# Patient Record
Sex: MALE | Race: WHITE | Employment: OTHER | RURAL
[De-identification: names, ages, dates, MRNs, and addresses within clinical notes are randomized per-mention and may not be internally consistent; named-entity substitution may affect disease eponyms.]

---

## 2017-01-23 RX ORDER — SUCRALFATE 1 G/1
1 TABLET ORAL 4 TIMES DAILY
COMMUNITY
End: 2022-09-16

## 2017-01-23 RX ORDER — PANTOPRAZOLE SODIUM 40 MG/1
40 TABLET, DELAYED RELEASE ORAL DAILY
COMMUNITY
End: 2022-09-16

## 2017-09-30 ENCOUNTER — OFFICE VISIT (OUTPATIENT)
Dept: PRIMARY CARE CLINIC | Age: 47
End: 2017-09-30
Payer: MEDICAID

## 2017-09-30 VITALS
HEART RATE: 70 BPM | OXYGEN SATURATION: 98 % | SYSTOLIC BLOOD PRESSURE: 118 MMHG | HEIGHT: 67 IN | BODY MASS INDEX: 29.82 KG/M2 | DIASTOLIC BLOOD PRESSURE: 72 MMHG | WEIGHT: 190 LBS | RESPIRATION RATE: 20 BRPM

## 2017-09-30 DIAGNOSIS — R42 VERTIGO: ICD-10-CM

## 2017-09-30 DIAGNOSIS — R25.1 TREMOR OF BOTH HANDS: Primary | ICD-10-CM

## 2017-09-30 DIAGNOSIS — R51.9 HEADACHE, UNSPECIFIED HEADACHE TYPE: ICD-10-CM

## 2017-09-30 LAB — GLUCOSE BLD-MCNC: 107 MG/DL

## 2017-09-30 PROCEDURE — 82962 GLUCOSE BLOOD TEST: CPT | Performed by: NURSE PRACTITIONER

## 2017-09-30 PROCEDURE — 99213 OFFICE O/P EST LOW 20 MIN: CPT | Performed by: NURSE PRACTITIONER

## 2017-09-30 ASSESSMENT — ENCOUNTER SYMPTOMS
RESPIRATORY NEGATIVE: 1
GASTROINTESTINAL NEGATIVE: 1
EYES NEGATIVE: 1

## 2017-10-02 ENCOUNTER — OFFICE VISIT (OUTPATIENT)
Dept: PRIMARY CARE CLINIC | Age: 47
End: 2017-10-02
Payer: MEDICAID

## 2017-10-02 VITALS
BODY MASS INDEX: 30.13 KG/M2 | SYSTOLIC BLOOD PRESSURE: 120 MMHG | DIASTOLIC BLOOD PRESSURE: 70 MMHG | HEIGHT: 67 IN | OXYGEN SATURATION: 98 % | RESPIRATION RATE: 20 BRPM | HEART RATE: 56 BPM | WEIGHT: 192 LBS

## 2017-10-02 DIAGNOSIS — R25.1 TREMORS OF NERVOUS SYSTEM: ICD-10-CM

## 2017-10-02 DIAGNOSIS — R51.9 ACUTE INTRACTABLE HEADACHE, UNSPECIFIED HEADACHE TYPE: Primary | ICD-10-CM

## 2017-10-02 DIAGNOSIS — K21.00 GASTROESOPHAGEAL REFLUX DISEASE WITH ESOPHAGITIS: ICD-10-CM

## 2017-10-02 DIAGNOSIS — E16.2 HYPOGLYCEMIA: ICD-10-CM

## 2017-10-02 LAB — HBA1C MFR BLD: 5.9 %

## 2017-10-02 PROCEDURE — 83036 HEMOGLOBIN GLYCOSYLATED A1C: CPT | Performed by: NURSE PRACTITIONER

## 2017-10-02 PROCEDURE — 99214 OFFICE O/P EST MOD 30 MIN: CPT | Performed by: NURSE PRACTITIONER

## 2017-10-02 RX ORDER — PANTOPRAZOLE SODIUM 40 MG/1
40 TABLET, DELAYED RELEASE ORAL DAILY
Qty: 30 TABLET | Refills: 3 | Status: SHIPPED | OUTPATIENT
Start: 2017-10-02 | End: 2018-01-03 | Stop reason: SDUPTHER

## 2017-10-02 RX ORDER — SUMATRIPTAN 50 MG/1
50 TABLET, FILM COATED ORAL
Qty: 9 TABLET | Refills: 3 | Status: SHIPPED | OUTPATIENT
Start: 2017-10-02 | End: 2018-01-03 | Stop reason: SDUPTHER

## 2017-10-02 ASSESSMENT — PATIENT HEALTH QUESTIONNAIRE - PHQ9
1. LITTLE INTEREST OR PLEASURE IN DOING THINGS: 1
SUM OF ALL RESPONSES TO PHQ QUESTIONS 1-9: 1
SUM OF ALL RESPONSES TO PHQ9 QUESTIONS 1 & 2: 1
2. FEELING DOWN, DEPRESSED OR HOPELESS: 0

## 2017-10-02 ASSESSMENT — ENCOUNTER SYMPTOMS
EYES NEGATIVE: 1
RESPIRATORY NEGATIVE: 1
GASTROINTESTINAL NEGATIVE: 1

## 2017-10-02 NOTE — PROGRESS NOTES
0.3 - 1.2 mg/dL Not in Time Range    Total Protein 7.6 (9/14/2017) 6.4 - 8.3 g/dL Not in Time Range        Hemoglobin A1C (%)   Date Value   10/02/2017 5.9     Microscopic Examination (no units)   Date Value   08/27/2016 YES     LDL Calculated (mg/dL)   Date Value   12/12/2016 162 (H)         Lab Results   Component Value Date    WBC 8.3 09/14/2017    NEUTROABS 3.2 09/14/2017    HGB 14.2 09/14/2017    HCT 41.3 09/14/2017    MCV 89.0 09/14/2017     09/14/2017       Lab Results   Component Value Date    TSH 3.96 12/12/2016         ASSESSMENT/PLAN:     1. Acute intractable headache, unspecified headache type  Ct normal.   Will treat with migraine medication.   - SUMAtriptan (IMITREX) 50 MG tablet; Take 1 tablet by mouth once as needed for Migraine  Dispense: 9 tablet; Refill: 3    2. Gastroesophageal reflux disease with esophagitis    - pantoprazole (PROTONIX) 40 MG tablet; Take 1 tablet by mouth daily  Dispense: 30 tablet; Refill: 3    3. Tremors of nervous system  A1C is 5.8.    - Hemoglobin A1C; Future    4.  Hypoglycemia     - POCT glycosylated hemoglobin (Hb A1C)

## 2017-10-02 NOTE — MR AVS SNAPSHOT
After Visit Summary             Fany Head   10/2/2017 9:30 AM   Office Visit    Description:  Male : 1970   Provider:  JEFFERY Park   Department:  [address] [place] [address] Drive and Future Appointments         Below is a list of your follow-up and future appointments. This may not be a complete list as you may have made appointments directly with providers that we are not aware of or your providers may have made some for you. Please call your providers to confirm appointments. It is important to keep your appointments. Please bring your current insurance card, photo ID, co-pay, and all medication bottles to your appointment. If self-pay, payment is expected at the time of service. Your To-Do List     Future Orders Complete By Expires    Hemoglobin A1C [LAB90 Custom]  2017 10/2/2018         Information from Your Visit        Department     Name Address Phone Fax    [address] [place] [phone]      You Were Seen for:         Comments    Acute intractable headache, unspecified headache type   [[id number]]         Vital Signs     Blood Pressure Pulse Respirations Height Weight Oxygen Saturation    120/70 56 20 5' 7\" (1.702 m) 192 lb (87.1 kg) 98%    Body Mass Index Smoking Status                30.07 kg/m2 Current Every Day Smoker          Additional Information about your Body Mass Index (BMI)           Your BMI as listed above is considered obese (30 or more). BMI is an estimate of body fat, calculated from your height and weight. The higher your BMI, the greater your risk of heart disease, high blood pressure, type 2 diabetes, stroke, gallstones, arthritis, sleep apnea, and certain cancers. BMI is not perfect. It may overestimate body fat in athletes and people who are more muscular.   Even a small weight loss (between 5 and 10 percent of your current weight) by decreasing your calorie intake and becoming more physically active will help lower your risk of developing or worsening diseases associated with obesity. Learn more at: [employer]          Instructions    · Keep a list of your medicines with you. List all of the prescription medicines, nonprescription medicines, supplements, natural remedies, and vitamins that you take. Tell your healthcare providers who treat you about all of the products you are taking. Your provider can provide you with a form to keep track of them. Just ask. · Follow the directions that come with your medicine, including information about food or alcohol. Make sure you know how and when to take your medicine. Do not take more or less than you are supposed to take. · Keep all medicines out of the reach of children. · Store medicines according to the directions on the label. · Monitor yourself. Learn to know how your body reacts to your new medicine and keep track of how it makes you feel before attempting (If your provider has allowed you to do so) to drive or go to work. · Seek emergency medical attention if you think you have used too much of this medicine. An overdose of any prescription medicine can be fatal. Overdose symptoms may include extreme drowsiness, muscle weakness, confusion, cold and clammy skin, pinpoint pupils, shallow breathing, slow heart rate, fainting, or coma. · Don't share prescription medicines with others, even when they seem to have the same symptoms. What may be good for you may be harmful to others. · If you are no longer taking a prescribed medication and you have pills left please take your pills out of their original containers. Mix crushed pills with an undesirable substance, such as cat litter or used coffee grounds.  Put the mixture into a disposable container with a lid, such as [facility] [phone] - F [phone]  [address]     Phone:  [phone]     pantoprazole 40 MG tablet    SUMAtriptan 50 MG tablet               Your Current Medications Are              pantoprazole (PROTONIX) 40 MG tablet Take 1 tablet by mouth daily    SUMAtriptan (IMITREX) 50 MG tablet Take 1 tablet by mouth once as needed for Migraine      Allergies              Shrimp (Diagnostic) Shortness Of Breath, Swelling    Penicillins Rash         Additional Information        Basic Information     Date Of Birth Sex Race Ethnicity Preferred Language    1970 Male White Non-/Non  Non-verbal      Problem List as of 10/2/2017  Date Reviewed: 10/2/2017                Generalized abdominal pain    Abdominal distension    Gastroesophageal reflux disease with esophagitis    Acne vulgaris    Gastroesophageal reflux disease without esophagitis    PTSD (post-traumatic stress disorder)    Chronic back pain      Preventive Care        Date Due    HIV screening is recommended for all people regardless of risk factors  aged 15-65 years at least once (lifetime) who have never been HIV tested. 7/25/1983    Tetanus Combination Vaccine (1 - Tdap) 7/25/1987    Pneumococcal Vaccine - Pneumovax for adults aged 19-64 years with: chronic heart disease, chronic lung disease, diabetes mellitus, alcoholism, chronic liver disease, or cigarette smoking. (1 of 1 - PPSV23) 7/25/1987    Diabetes Screening 7/25/2008    Yearly Flu Vaccine (1) 9/1/2017    Cholesterol Screening 12/12/2021            [employer] Signup           MyChart allows you to send messages to your doctor, view your test results, renew your prescriptions, schedule appointments, view visit notes, and more. How Do I Sign Up? 1. In your Internet browser, go to [email or web address]  2. Click on the Sign Up Now link in the Sign In box. You will see the New Member Sign Up page.

## 2018-01-03 ENCOUNTER — HOSPITAL ENCOUNTER (OUTPATIENT)
Dept: GENERAL RADIOLOGY | Age: 48
Discharge: HOME | End: 2018-01-03
Attending: NURSE PRACTITIONER | Admitting: NURSE PRACTITIONER

## 2018-01-03 ENCOUNTER — OFFICE VISIT (OUTPATIENT)
Dept: PRIMARY CARE CLINIC | Age: 48
End: 2018-01-03
Payer: MEDICAID

## 2018-01-03 VITALS
DIASTOLIC BLOOD PRESSURE: 68 MMHG | SYSTOLIC BLOOD PRESSURE: 138 MMHG | OXYGEN SATURATION: 98 % | WEIGHT: 194.8 LBS | BODY MASS INDEX: 30.51 KG/M2 | RESPIRATION RATE: 18 BRPM | HEART RATE: 69 BPM

## 2018-01-03 DIAGNOSIS — R51.9 ACUTE INTRACTABLE HEADACHE, UNSPECIFIED HEADACHE TYPE: ICD-10-CM

## 2018-01-03 DIAGNOSIS — K21.00 GASTROESOPHAGEAL REFLUX DISEASE WITH ESOPHAGITIS: ICD-10-CM

## 2018-01-03 DIAGNOSIS — R22.1 NECK SWELLING: Primary | ICD-10-CM

## 2018-01-03 DIAGNOSIS — R22.1 NECK SWELLING: ICD-10-CM

## 2018-01-03 PROCEDURE — 4004F PT TOBACCO SCREEN RCVD TLK: CPT | Performed by: NURSE PRACTITIONER

## 2018-01-03 PROCEDURE — G8484 FLU IMMUNIZE NO ADMIN: HCPCS | Performed by: NURSE PRACTITIONER

## 2018-01-03 PROCEDURE — G8417 CALC BMI ABV UP PARAM F/U: HCPCS | Performed by: NURSE PRACTITIONER

## 2018-01-03 PROCEDURE — G8427 DOCREV CUR MEDS BY ELIG CLIN: HCPCS | Performed by: NURSE PRACTITIONER

## 2018-01-03 PROCEDURE — 99214 OFFICE O/P EST MOD 30 MIN: CPT | Performed by: NURSE PRACTITIONER

## 2018-01-03 RX ORDER — SUMATRIPTAN 50 MG/1
50 TABLET, FILM COATED ORAL
Qty: 9 TABLET | Refills: 3 | Status: SHIPPED | OUTPATIENT
Start: 2018-01-03 | End: 2018-08-08

## 2018-01-03 RX ORDER — PANTOPRAZOLE SODIUM 40 MG/1
40 TABLET, DELAYED RELEASE ORAL DAILY
Qty: 30 TABLET | Refills: 3 | Status: SHIPPED | OUTPATIENT
Start: 2018-01-03 | End: 2018-08-29 | Stop reason: SDUPTHER

## 2018-01-03 ASSESSMENT — ENCOUNTER SYMPTOMS
RESPIRATORY NEGATIVE: 1
EYES NEGATIVE: 1
GASTROINTESTINAL NEGATIVE: 1

## 2018-01-03 NOTE — PROGRESS NOTES
has no wheezes. He has no rales. Abdominal: Soft. Bowel sounds are normal. He exhibits no distension. There is no tenderness. There is no guarding. Musculoskeletal: He exhibits no edema or tenderness. Left shoulder: He exhibits swelling (from top of AC joint to his neck). Neurological: He is alert and oriented to person, place, and time. Skin: Skin is warm and dry. No rash noted. Psychiatric: He has a normal mood and affect. Judgment normal.   Nursing note and vitals reviewed. No results found for requested labs within last 30 days. Hemoglobin A1C (%)   Date Value   10/02/2017 5.9     Microscopic Examination (no units)   Date Value   08/27/2016 YES     LDL Calculated (mg/dL)   Date Value   12/12/2016 162 (H)         Lab Results   Component Value Date    WBC 8.3 09/14/2017    NEUTROABS 3.2 09/14/2017    HGB 14.2 09/14/2017    HCT 41.3 09/14/2017    MCV 89.0 09/14/2017     09/14/2017       Lab Results   Component Value Date    TSH 3.96 12/12/2016         ASSESSMENT/PLAN:     1. Neck swelling  will get a soft tissue and thyroid ultrasound for further evaluation.  - US Head Neck Soft Tissue Thyroid; Future    2. Gastroesophageal reflux disease with esophagitis    - pantoprazole (PROTONIX) 40 MG tablet; Take 1 tablet by mouth daily  Dispense: 30 tablet; Refill: 3    3. Acute intractable headache, unspecified headache type    - SUMAtriptan (IMITREX) 50 MG tablet; Take 1 tablet by mouth once as needed for Migraine  Dispense: 9 tablet;  Refill: 3

## 2018-01-05 ENCOUNTER — TELEPHONE (OUTPATIENT)
Dept: PRIMARY CARE CLINIC | Age: 48
End: 2018-01-05

## 2018-01-05 RX ORDER — METHYLPREDNISOLONE 4 MG/1
TABLET ORAL
Qty: 21 TABLET | Refills: 0 | Status: SHIPPED | OUTPATIENT
Start: 2018-01-05 | End: 2018-01-11

## 2018-03-29 ENCOUNTER — OFFICE VISIT (OUTPATIENT)
Dept: PRIMARY CARE CLINIC | Age: 48
End: 2018-03-29
Payer: MEDICAID

## 2018-03-29 VITALS
BODY MASS INDEX: 31.77 KG/M2 | RESPIRATION RATE: 16 BRPM | DIASTOLIC BLOOD PRESSURE: 70 MMHG | HEART RATE: 74 BPM | OXYGEN SATURATION: 98 % | WEIGHT: 202.4 LBS | SYSTOLIC BLOOD PRESSURE: 110 MMHG | TEMPERATURE: 97.4 F | HEIGHT: 67 IN

## 2018-03-29 DIAGNOSIS — R06.02 SHORTNESS OF BREATH: ICD-10-CM

## 2018-03-29 DIAGNOSIS — R63.5 WEIGHT GAIN: ICD-10-CM

## 2018-03-29 DIAGNOSIS — G47.33 OSA (OBSTRUCTIVE SLEEP APNEA): Primary | ICD-10-CM

## 2018-03-29 DIAGNOSIS — R53.83 FATIGUE, UNSPECIFIED TYPE: ICD-10-CM

## 2018-03-29 PROCEDURE — G8484 FLU IMMUNIZE NO ADMIN: HCPCS | Performed by: NURSE PRACTITIONER

## 2018-03-29 PROCEDURE — 4004F PT TOBACCO SCREEN RCVD TLK: CPT | Performed by: NURSE PRACTITIONER

## 2018-03-29 PROCEDURE — 99214 OFFICE O/P EST MOD 30 MIN: CPT | Performed by: NURSE PRACTITIONER

## 2018-03-29 PROCEDURE — G8417 CALC BMI ABV UP PARAM F/U: HCPCS | Performed by: NURSE PRACTITIONER

## 2018-03-29 PROCEDURE — G8427 DOCREV CUR MEDS BY ELIG CLIN: HCPCS | Performed by: NURSE PRACTITIONER

## 2018-03-29 ASSESSMENT — ENCOUNTER SYMPTOMS
SHORTNESS OF BREATH: 1
EYES NEGATIVE: 1
GASTROINTESTINAL NEGATIVE: 1
COUGH: 1

## 2018-03-29 NOTE — PATIENT INSTRUCTIONS
ips to Help You Stop Smoking       Cigarette smoking is a preventable cause of death in the [place]. If you have thought about quitting but haven't been able to, here are some reasons why you should and some ways to do it. Here's Why   Quitting smoking now can decrease your risk of getting smoking-related illnesses like:   Heart disease   Stroke   Several types of cancer, including:   Lung   Mouth   Esophagus   Larynx   Bladder   Pancreas   Kidney   Chronic lung diseases:   Bronchitis   Emphysema   Asthma   Cataracts   Macular degeneration   Thyroid conditions   Hearing loss   Erectile dysfunction   Dementia   Osteoporosis   Here's How   Once you've decided to quit smoking, set your target quit date a few weeks away. In the time leading up to your quit day, try some of these ideas offered by the [address] to help you successfully quit smoking. For the best results, work with your doctor. Together, you can test your lung function and compare the results to those of a nonsmoking person. The results can be given to you as your lung age. Finding out your lung age right after having the test done may help you to stop smoking. Your doctor can also discuss with you all of your options and refer you to smoking-cessation support groups. You may wish to use nicotine replacement (gum, patches, inhaler) or one of the prescription medications that have been shown to increase quit rates and prolong abstinence from smoking. But whatever you and your doctor decide on these matters, it will still be you who decides when an how to quit. Here are some techniques:   Switch Brands   Switch to a brand you find distasteful. Change to a brand that is low in tar and nicotine a couple of weeks before your target quit date. This will help change your smoking behavior.  However, do not smoke more cigarettes, inhale them more often or more deeply, or place your fingertips over water and fruit juice (but avoid sodas that contain caffeine). Try to avoid alcohol, coffee, and other beverages that you associate with cigarette smoking. Strike up conversation instead of a match for a cigarette. If you miss the sensation of having a cigarette in your hand, play with something elsea pencil, a paper clip, a marble. If you miss having something in your mouth, try toothpicks or a fake cigarette. · Keep a list of your medicines with you. List all of the prescription medicines, nonprescription medicines, supplements, natural remedies, and vitamins that you take. Tell your healthcare providers who treat you about all of the products you are taking. Your provider can provide you with a form to keep track of them. Just ask. · Follow the directions that come with your medicine, including information about food or alcohol. Make sure you know how and when to take your medicine. Do not take more or less than you are supposed to take. · Keep all medicines out of the reach of children. · Store medicines according to the directions on the label. · Monitor yourself. Learn to know how your body reacts to your new medicine and keep track of how it makes you feel before attempting (If your provider has allowed you to do so) to drive or go to work. · Seek emergency medical attention if you think you have used too much of this medicine. An overdose of any prescription medicine can be fatal. Overdose symptoms may include extreme drowsiness, muscle weakness, confusion, cold and clammy skin, pinpoint pupils, shallow breathing, slow heart rate, fainting, or coma. · Don't share prescription medicines with others, even when they seem to have the same symptoms. What may be good for you may be harmful to others. · If you are no longer taking a prescribed medication and you have pills left please take your pills out of their original containers.  Mix crushed pills with an undesirable substance, such as cat

## 2018-03-29 NOTE — PROGRESS NOTES
Chief Complaint   Patient presents with    Shortness of Breath           Have you seen any other physician or provider since your last visit no    Have you had any other diagnostic tests since your last visit? no    Have you changed or stopped any medications since your last visit? no       Patient c/o shortness of breath that has been ongoing for 1 month. He states he wakes himself up trying to catch his breath. His wife states he snores to the point that it wakes her up.

## 2018-08-08 ENCOUNTER — HOSPITAL ENCOUNTER (EMERGENCY)
Facility: HOSPITAL | Age: 48
Discharge: HOME | End: 2018-08-08
Attending: HOSPITALIST
Payer: MEDICAID

## 2018-08-08 VITALS
TEMPERATURE: 98.7 F | HEIGHT: 68 IN | SYSTOLIC BLOOD PRESSURE: 102 MMHG | HEART RATE: 80 BPM | OXYGEN SATURATION: 98 % | RESPIRATION RATE: 18 BRPM | BODY MASS INDEX: 27.28 KG/M2 | WEIGHT: 180 LBS | DIASTOLIC BLOOD PRESSURE: 68 MMHG

## 2018-08-08 DIAGNOSIS — L30.9 DERMATITIS: Primary | ICD-10-CM

## 2018-08-08 PROCEDURE — 99282 EMERGENCY DEPT VISIT SF MDM: CPT

## 2018-08-08 RX ORDER — CLOTRIMAZOLE AND BETAMETHASONE DIPROPIONATE 10; .64 MG/G; MG/G
CREAM TOPICAL
Qty: 45 G | Refills: 0 | Status: SHIPPED | OUTPATIENT
Start: 2018-08-08 | End: 2018-08-21

## 2018-08-08 NOTE — ED PROVIDER NOTES
PAST MEDICAL HISTORY     Past Medical History:   Diagnosis Date    Chronic back pain     GERD (gastroesophageal reflux disease)     Headache          SURGICAL HISTORY     History reviewed. No pertinent surgical history. CURRENT MEDICATIONS       Previous Medications    PANTOPRAZOLE (PROTONIX) 40 MG TABLET    Take 1 tablet by mouth daily       ALLERGIES     Shrimp (diagnostic) and Penicillins    FAMILY HISTORY       Family History   Problem Relation Age of Onset    Cancer Mother         LUNG    Cancer Father         LUNG          SOCIAL HISTORY       Social History     Social History    Marital status:      Spouse name: N/A    Number of children: N/A    Years of education: N/A     Social History Main Topics    Smoking status: Current Every Day Smoker     Packs/day: 0.50     Years: 40.00     Types: Cigarettes    Smokeless tobacco: Current User     Types: Chew    Alcohol use No    Drug use: No    Sexual activity: Not Asked     Other Topics Concern    None     Social History Narrative    None         PHYSICAL EXAM    (up to 7 for level 4, 8 or more for level 5)     ED Triage Vitals [08/08/18 1848]   BP Temp Temp Source Pulse Resp SpO2 Height Weight   123/75 98.7 °F (37.1 °C) Oral 75 18 98 % 5' 8\" (1.727 m) 180 lb (81.6 kg)       Physical Exam  General :Patient is awake, alert, oriented, in no acute distress, nontoxic appearing  HEENT: Pupils are equally round and reactive to light, EOMI, conjunctivae clear, sclerae white, there is no injection no icterus. Musculoskeletal: As all 4 extremities. No focal muscle deficits are appreciated  Neuro: Motor intact, sensory intact, level of consciousness is normal  Dermatology: Skin is warm and dry, bilateral feet more located to the dorsum the latter aspects are dry cracked flaky. There are areas of excoriation where the patient has scratched the surface of the skin off but no cellulitic changes.  Mild areas on the plantar surface of the foot

## 2018-08-21 ENCOUNTER — HOSPITAL ENCOUNTER (EMERGENCY)
Facility: HOSPITAL | Age: 48
Discharge: HOME | End: 2018-08-21
Attending: HOSPITALIST
Payer: MEDICAID

## 2018-08-21 VITALS
BODY MASS INDEX: 28.25 KG/M2 | RESPIRATION RATE: 16 BRPM | WEIGHT: 180 LBS | OXYGEN SATURATION: 98 % | HEIGHT: 67 IN | DIASTOLIC BLOOD PRESSURE: 78 MMHG | SYSTOLIC BLOOD PRESSURE: 122 MMHG | TEMPERATURE: 98.4 F | HEART RATE: 75 BPM
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
## 2018-08-21 DIAGNOSIS — S00.06XA INSECT BITE, NONVENOMOUS OF FACE, NECK, AND SCALP EXCEPT EYE, INITIAL ENCOUNTER: Primary | ICD-10-CM

## 2018-08-21 DIAGNOSIS — B35.3 TINEA PEDIS OF BOTH FEET: ICD-10-CM

## 2018-08-21 DIAGNOSIS — S10.96XA INSECT BITE, NONVENOMOUS OF FACE, NECK, AND SCALP EXCEPT EYE, INITIAL ENCOUNTER: Primary | ICD-10-CM

## 2018-08-21 DIAGNOSIS — R21 RASH AND OTHER NONSPECIFIC SKIN ERUPTION: ICD-10-CM

## 2018-08-21 DIAGNOSIS — S00.86XA INSECT BITE, NONVENOMOUS OF FACE, NECK, AND SCALP EXCEPT EYE, INITIAL ENCOUNTER: Primary | ICD-10-CM

## 2018-08-21 PROCEDURE — 99282 EMERGENCY DEPT VISIT SF MDM: CPT

## 2018-08-21 RX ORDER — DIPHENHYDRAMINE HCL 25 MG
25 CAPSULE ORAL EVERY 6 HOURS PRN
Qty: 20 CAPSULE | Refills: 0 | Status: SHIPPED | OUTPATIENT
Start: 2018-08-21 | End: 2018-08-31

## 2018-08-21 RX ORDER — CLOTRIMAZOLE 1 %
CREAM (GRAM) TOPICAL
Qty: 40 G | Refills: 1 | Status: SHIPPED | OUTPATIENT
Start: 2018-08-21 | End: 2018-08-28

## 2018-08-21 NOTE — ED PROVIDER NOTES
[address] [place] [address] Court  eMERGENCY dEPARTMENT eNCOUnter      Pt Name: Beltran Shi  MRN: [id number]  [place]: 1970  Date of evaluation: 8/21/2018  Provider: Amparo Rangel, [address] [place] [address]       Chief Complaint   Patient presents with    Rash     pt states he has \"turkey mite bites all over his body\"; went ginseng hunting 3 days ago and has had since. HISTORY OF PRESENT ILLNESS  (Location/Symptom, Timing/Onset, Context/Setting, Quality, Duration, Modifying Factors, Severity.)   Beltran Shi is a 46 y.o. male who presents to the emergency department for \"turkey mite bites\" all over. Patient was recently evaluated by me here in the emergency department for a fungal infection in his foot and received a prescription of clotrimazole. Patient is also very requesting a prescription for that medication he states that has improved not when completely away. She states he's literally almost to the bath and bleach trying to get the itching to stop around where the turkey mites have bitten him. Patient advised there is really technically no such thing as a turkey mite there just larval stage of takes which bite and caused extreme itching. Patient denies any other symptoms. No fevers or chills. No nausea vomiting diarrhea constipation. No abdominal pain. No chest pain or shortness of breath. She here for evaluation for insect bites. Nursing notes were reviewed.     REVIEW OF SYSTEMS    (2-9 systems for level 4, 10 or more for level 5)   ROS:  General:  No fevers, no chills, no weakness  Cardiovascular:  No chest pain, no palpitations  Respiratory:  No shortness of breath, no cough, no wheezing  Gastrointestinal:  No pain, no nausea, no vomiting, no diarrhea  Musculoskeletal:  No muscle pain, no joint pain  Skin:  + rash/multiple bites, no easy bruising  Neurologic:  No speech problems, no headache, no extremity numbness, no extremity tingling, no extremity weakness  Psychiatric:  No normal, cognition is grossly normal. Affect is appropriate. DIAGNOSTIC RESULTS     EKG: All EKG's are interpreted by the Emergency Department Physician who either signs or Co-signs this chart in the absence of a cardiologist.        RADIOLOGY:   Non-plain film images such as CT, Ultrasound and MRI are read by the radiologist. Plain radiographic images are visualized and preliminarily interpreted by the emergency physician with the below findings:      [] Radiologist's Report Reviewed:  No orders to display         ED BEDSIDE ULTRASOUND:   Performed by ED Physician - none    LABS:  Labs Reviewed - No data to display    I have reviewed and interpreted all of the currently available lab results from this visit (if applicable):  No results found for this visit on 08/21/18. All other labs were within normal range or not returned as of this dictation. EMERGENCY DEPARTMENT COURSE and DIFFERENTIAL DIAGNOSIS/MDM:   Vitals:    Vitals:    08/21/18 1546   BP: 120/78   Pulse: 67   Resp: 16   Temp: 98.4 °F (36.9 °C)   TempSrc: Oral   SpO2: 98%   Weight: 180 lb (81.6 kg)   Height: 5' 7\" (1.702 m)       After initial evaluation examination I did have conversation with the patient but the upcoming plan, treatment and possible disposition which she is agreeable to the times dictation. Patient otherwise is actually nothing really to do for the insect bites himself except for treatment for the itching. Benadryl can help with the itching but can also cause some sleepiness. Calamine lotion is also been known to help with some of the itching also patient advised to either stay out of the woods or use DEET spray to keep insects off of him. No changes of the skin for cellulitis or question labs this. Patient be written a prescription for Benadryl advised to try some over-the-counter calamine lotion and stay with extremely hot showers which can make the itching worse.  Write him a note prescription for clotrimazole for his foot

## 2018-08-29 DIAGNOSIS — K21.00 GASTROESOPHAGEAL REFLUX DISEASE WITH ESOPHAGITIS: ICD-10-CM

## 2018-08-29 RX ORDER — PANTOPRAZOLE SODIUM 40 MG/1
40 TABLET, DELAYED RELEASE ORAL DAILY
Qty: 30 TABLET | Refills: 3 | Status: SHIPPED | OUTPATIENT
Start: 2018-08-29 | End: 2018-08-31 | Stop reason: SDUPTHER

## 2018-08-31 ENCOUNTER — OFFICE VISIT (OUTPATIENT)
Dept: FAMILY MEDICINE CLINIC | Age: 48
End: 2018-08-31
Payer: MEDICAID

## 2018-08-31 VITALS
BODY MASS INDEX: 27.94 KG/M2 | HEIGHT: 67 IN | DIASTOLIC BLOOD PRESSURE: 72 MMHG | HEART RATE: 70 BPM | RESPIRATION RATE: 18 BRPM | OXYGEN SATURATION: 96 % | WEIGHT: 178 LBS | SYSTOLIC BLOOD PRESSURE: 106 MMHG
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
## 2018-08-31 DIAGNOSIS — Z55.0 UNABLE TO READ OR WRITE: ICD-10-CM

## 2018-08-31 DIAGNOSIS — K21.00 GASTROESOPHAGEAL REFLUX DISEASE WITH ESOPHAGITIS: Primary | ICD-10-CM

## 2018-08-31 PROCEDURE — G8427 DOCREV CUR MEDS BY ELIG CLIN: HCPCS | Performed by: NURSE PRACTITIONER

## 2018-08-31 PROCEDURE — 4004F PT TOBACCO SCREEN RCVD TLK: CPT | Performed by: NURSE PRACTITIONER

## 2018-08-31 PROCEDURE — G8417 CALC BMI ABV UP PARAM F/U: HCPCS | Performed by: NURSE PRACTITIONER

## 2018-08-31 PROCEDURE — 99214 OFFICE O/P EST MOD 30 MIN: CPT | Performed by: NURSE PRACTITIONER

## 2018-08-31 PROCEDURE — 3017F COLORECTAL CA SCREEN DOC REV: CPT | Performed by: NURSE PRACTITIONER

## 2018-08-31 RX ORDER — HYDROXYZINE HYDROCHLORIDE 25 MG/1
25 TABLET, FILM COATED ORAL EVERY 6 HOURS PRN
Qty: 60 TABLET | Refills: 0 | Status: SHIPPED | OUTPATIENT
Start: 2018-08-31 | End: 2018-09-10

## 2018-08-31 RX ORDER — PERMETHRIN 50 MG/G
CREAM TOPICAL
Qty: 60 G | Refills: 0 | Status: SHIPPED | OUTPATIENT
Start: 2018-08-31 | End: 2019-05-07

## 2018-08-31 RX ORDER — PREDNISONE 10 MG/1
TABLET ORAL
Qty: 1 EACH | Refills: 0 | Status: SHIPPED | OUTPATIENT
Start: 2018-08-31 | End: 2018-11-05

## 2018-08-31 RX ORDER — PANTOPRAZOLE SODIUM 40 MG/1
40 TABLET, DELAYED RELEASE ORAL DAILY
Qty: 30 TABLET | Refills: 3 | Status: SHIPPED | OUTPATIENT
Start: 2018-08-31 | End: 2019-05-07

## 2018-08-31 SDOH — EDUCATIONAL SECURITY - EDUCATION ATTAINMENT: ILITERACY AND LOW LEVEL LITERACY: Z55.0

## 2018-08-31 ASSESSMENT — ENCOUNTER SYMPTOMS
SORE THROAT: 0
HEARTBURN: 1
ALLERGIC/IMMUNOLOGIC NEGATIVE: 1
VOMITING: 0
DIARRHEA: 0
SHORTNESS OF BREATH: 0
COUGH: 0
WATER BRASH: 0
ABDOMINAL PAIN: 0
NAUSEA: 0

## 2018-08-31 NOTE — PROGRESS NOTES
SUBJECTIVE:    Majorie Favre is a 46 y.o. male    Pt reports picking off small deer ticks off approx 1 week ago from all over. Pt c/o itching all over. Symptoms are unchanged. Pt reports itching is keeping him up at night. Pt reports he needs a form filled out to get a 2 bedroom apartment. Pt reports he is unable to read and write. Pt also has anxiety attacks and needs someone to live in the apartment with him. Pt reports he ex wife will stay with him and his daughter will stay other days. Gastroesophageal Reflux   He complains of heartburn. He reports no abdominal pain, no chest pain, no coughing, no nausea, no sore throat or no water brash. Hot water in mouth 6-7 times per day. This is a chronic problem. The current episode started more than 1 year ago (several years). The problem occurs frequently (5-6 times per day). The problem has been waxing and waning. The heartburn is of mild intensity. The heartburn does not wake him from sleep. The heartburn does not limit his activity. The heartburn doesn't change with position. The symptoms are aggravated by caffeine and smoking. Pertinent negatives include no fatigue. Risk factors include caffeine use, smoking/tobacco exposure and lack of exercise. He has tried a PPI for the symptoms. The treatment provided mild relief. Chief Complaint   Patient presents with    Gastroesophageal Reflux     Refill     Insect Bite     pt states he has turkey mites    Other     pt has a form to fill out to state he cannot stay by himself. Pt caretaker states he cannot read or write, he has chronic back problems and has anxiety attacks. Review of Systems   Constitutional: Negative. Negative for fatigue. HENT: Negative for postnasal drip and sore throat. Respiratory: Negative for cough and shortness of breath. Cardiovascular: Negative for chest pain. Gastrointestinal: Positive for heartburn. Negative for abdominal pain, diarrhea, nausea and vomiting.

## 2018-11-05 ENCOUNTER — APPOINTMENT (OUTPATIENT)
Dept: CT IMAGING | Facility: HOSPITAL | Age: 48
End: 2018-11-05
Payer: MEDICAID

## 2018-11-05 ENCOUNTER — HOSPITAL ENCOUNTER (EMERGENCY)
Facility: HOSPITAL | Age: 48
Discharge: HOME | End: 2018-11-05
Attending: EMERGENCY MEDICINE
Payer: MEDICAID

## 2018-11-05 VITALS
DIASTOLIC BLOOD PRESSURE: 81 MMHG | HEART RATE: 51 BPM | SYSTOLIC BLOOD PRESSURE: 139 MMHG | OXYGEN SATURATION: 98 % | RESPIRATION RATE: 16 BRPM

## 2018-11-05 DIAGNOSIS — K27.9 PEPTIC ULCER DISEASE: Primary | ICD-10-CM

## 2018-11-05 LAB
A/G RATIO: 1.8 (ref 0.8–2)
ALBUMIN SERPL-MCNC: 5.3 G/DL (ref 3.4–4.8)
ALP BLD-CCNC: 35 U/L (ref 25–100)
ALT SERPL-CCNC: 17 U/L (ref 4–36)
AMYLASE: 61 U/L (ref 20–104)
ANION GAP SERPL CALCULATED.3IONS-SCNC: 12 MMOL/L (ref 3–16)
AST SERPL-CCNC: 15 U/L (ref 8–33)
BASOPHILS ABSOLUTE: 0.1 K/UL (ref 0–0.1)
BASOPHILS RELATIVE PERCENT: 0.7 %
BILIRUB SERPL-MCNC: 0.3 MG/DL (ref 0.3–1.2)
BUN BLDV-MCNC: 13 MG/DL (ref 6–20)
CALCIUM SERPL-MCNC: 10.6 MG/DL (ref 8.5–10.5)
CHLORIDE BLD-SCNC: 99 MMOL/L (ref 98–107)
CO2: 30 MMOL/L (ref 20–30)
CREAT SERPL-MCNC: 0.9 MG/DL (ref 0.4–1.2)
EOSINOPHILS ABSOLUTE: 0.5 K/UL (ref 0–0.4)
EOSINOPHILS RELATIVE PERCENT: 5.4 %
GFR AFRICAN AMERICAN: >59
GFR NON-AFRICAN AMERICAN: >59
GLOBULIN: 3 G/DL
GLUCOSE BLD-MCNC: 117 MG/DL (ref 74–106)
HCT VFR BLD CALC: 47.2 % (ref 40–54)
HEMOGLOBIN: 15.3 G/DL (ref 13–18)
IMMATURE GRANULOCYTES #: 0 K/UL
IMMATURE GRANULOCYTES %: 0.3 % (ref 0–5)
LACTIC ACID: 1.8 MMOL/L (ref 0.4–2)
LIPASE: 38 U/L (ref 5.6–51.3)
LYMPHOCYTES ABSOLUTE: 2.7 K/UL (ref 1.5–4)
LYMPHOCYTES RELATIVE PERCENT: 29.3 %
MCH RBC QN AUTO: 29.8 PG (ref 27–32)
MCHC RBC AUTO-ENTMCNC: 32.4 G/DL (ref 31–35)
MCV RBC AUTO: 92 FL (ref 80–100)
MONOCYTES ABSOLUTE: 0.5 K/UL (ref 0.2–0.8)
MONOCYTES RELATIVE PERCENT: 5.7 %
NEUTROPHILS ABSOLUTE: 5.3 K/UL (ref 2–7.5)
NEUTROPHILS RELATIVE PERCENT: 58.6 %
PDW BLD-RTO: 13.1 % (ref 11–16)
PLATELET # BLD: 274 K/UL (ref 150–400)
PMV BLD AUTO: 11 FL (ref 6–10)
POTASSIUM SERPL-SCNC: 4.3 MMOL/L (ref 3.4–5.1)
RBC # BLD: 5.13 M/UL (ref 4.5–6)
SODIUM BLD-SCNC: 141 MMOL/L (ref 136–145)
TOTAL PROTEIN: 8.3 G/DL (ref 6.4–8.3)
WBC # BLD: 9.1 K/UL (ref 4–11)

## 2018-11-05 PROCEDURE — 2580000003 HC RX 258: Performed by: FAMILY MEDICINE

## 2018-11-05 PROCEDURE — 96375 TX/PRO/DX INJ NEW DRUG ADDON: CPT

## 2018-11-05 PROCEDURE — 83690 ASSAY OF LIPASE: CPT

## 2018-11-05 PROCEDURE — S0028 INJECTION, FAMOTIDINE, 20 MG: HCPCS | Performed by: EMERGENCY MEDICINE

## 2018-11-05 PROCEDURE — 82150 ASSAY OF AMYLASE: CPT

## 2018-11-05 PROCEDURE — 2500000003 HC RX 250 WO HCPCS: Performed by: EMERGENCY MEDICINE

## 2018-11-05 PROCEDURE — 6360000002 HC RX W HCPCS

## 2018-11-05 PROCEDURE — 36415 COLL VENOUS BLD VENIPUNCTURE: CPT

## 2018-11-05 PROCEDURE — 80053 COMPREHEN METABOLIC PANEL: CPT

## 2018-11-05 PROCEDURE — 74176 CT ABD & PELVIS W/O CONTRAST: CPT

## 2018-11-05 PROCEDURE — 6360000002 HC RX W HCPCS: Performed by: EMERGENCY MEDICINE

## 2018-11-05 PROCEDURE — 99284 EMERGENCY DEPT VISIT MOD MDM: CPT

## 2018-11-05 PROCEDURE — 85025 COMPLETE CBC W/AUTO DIFF WBC: CPT

## 2018-11-05 PROCEDURE — 96365 THER/PROPH/DIAG IV INF INIT: CPT

## 2018-11-05 PROCEDURE — 83605 ASSAY OF LACTIC ACID: CPT

## 2018-11-05 PROCEDURE — 6370000000 HC RX 637 (ALT 250 FOR IP): Performed by: EMERGENCY MEDICINE

## 2018-11-05 PROCEDURE — 2580000003 HC RX 258: Performed by: EMERGENCY MEDICINE

## 2018-11-05 RX ORDER — CLARITHROMYCIN 500 MG/1
500 TABLET, COATED ORAL 2 TIMES DAILY
Qty: 20 TABLET | Refills: 0 | Status: SHIPPED | OUTPATIENT
Start: 2018-11-05 | End: 2018-11-15

## 2018-11-05 RX ORDER — METRONIDAZOLE 500 MG/1
500 TABLET ORAL EVERY 8 HOURS SCHEDULED
Status: DISCONTINUED | OUTPATIENT
Start: 2018-11-05 | End: 2018-11-06 | Stop reason: HOSPADM

## 2018-11-05 RX ORDER — METRONIDAZOLE 500 MG/1
500 TABLET ORAL 3 TIMES DAILY
Qty: 21 TABLET | Refills: 0 | Status: SHIPPED | OUTPATIENT
Start: 2018-11-05 | End: 2018-11-12

## 2018-11-05 RX ORDER — CLARITHROMYCIN 500 MG/1
500 TABLET, COATED ORAL EVERY 12 HOURS SCHEDULED
Status: DISCONTINUED | OUTPATIENT
Start: 2018-11-05 | End: 2018-11-06 | Stop reason: HOSPADM

## 2018-11-05 RX ORDER — 0.9 % SODIUM CHLORIDE 0.9 %
1000 INTRAVENOUS SOLUTION INTRAVENOUS ONCE
Status: COMPLETED | OUTPATIENT
Start: 2018-11-05 | End: 2018-11-05

## 2018-11-05 RX ORDER — RANITIDINE 150 MG/1
150 TABLET ORAL 2 TIMES DAILY
Qty: 60 TABLET | Refills: 3 | Status: SHIPPED | OUTPATIENT
Start: 2018-11-05 | End: 2019-05-07

## 2018-11-05 RX ORDER — ONDANSETRON 2 MG/ML
INJECTION INTRAMUSCULAR; INTRAVENOUS
Status: COMPLETED
Start: 2018-11-05 | End: 2018-11-05

## 2018-11-05 RX ORDER — ONDANSETRON 2 MG/ML
4 INJECTION INTRAMUSCULAR; INTRAVENOUS ONCE
Status: COMPLETED | OUTPATIENT
Start: 2018-11-05 | End: 2018-11-05

## 2018-11-05 RX ORDER — MORPHINE SULFATE 4 MG/ML
4 INJECTION, SOLUTION INTRAMUSCULAR; INTRAVENOUS ONCE
Status: COMPLETED | OUTPATIENT
Start: 2018-11-05 | End: 2018-11-05

## 2018-11-05 RX ORDER — TRAMADOL HYDROCHLORIDE 50 MG/1
50 TABLET ORAL EVERY 6 HOURS PRN
Qty: 12 TABLET | Refills: 0 | Status: SHIPPED | OUTPATIENT
Start: 2018-11-05 | End: 2018-11-08

## 2018-11-05 RX ADMIN — Medication 25 MG: at 19:29

## 2018-11-05 RX ADMIN — MORPHINE SULFATE 4 MG: 4 INJECTION INTRAVENOUS at 19:28

## 2018-11-05 RX ADMIN — ONDANSETRON 4 MG: 2 INJECTION INTRAMUSCULAR; INTRAVENOUS at 18:19

## 2018-11-05 RX ADMIN — SODIUM CHLORIDE 1000 ML: 9 INJECTION, SOLUTION INTRAVENOUS at 18:19

## 2018-11-05 RX ADMIN — FAMOTIDINE 20 MG: 10 INJECTION, SOLUTION INTRAVENOUS at 19:28

## 2018-11-05 RX ADMIN — METRONIDAZOLE 500 MG: 500 TABLET ORAL at 22:39

## 2018-11-05 RX ADMIN — CLARITHROMYCIN 500 MG: 500 TABLET ORAL at 22:38

## 2018-11-05 RX ADMIN — SODIUM CHLORIDE 1000 ML: 9 INJECTION, SOLUTION INTRAVENOUS at 19:34

## 2018-11-05 RX ADMIN — LIDOCAINE HYDROCHLORIDE: 20 SOLUTION ORAL; TOPICAL at 21:13

## 2018-11-05 ASSESSMENT — PAIN DESCRIPTION - LOCATION: LOCATION: ABDOMEN

## 2018-11-05 ASSESSMENT — PAIN DESCRIPTION - PAIN TYPE: TYPE: ACUTE PAIN

## 2018-11-05 ASSESSMENT — PAIN SCALES - GENERAL
PAINLEVEL_OUTOF10: 8
PAINLEVEL_OUTOF10: 10
PAINLEVEL_OUTOF10: 7

## 2018-11-05 ASSESSMENT — PAIN DESCRIPTION - ORIENTATION: ORIENTATION: LEFT

## 2018-11-05 NOTE — ED TRIAGE NOTES
Pt c/o Nausea, Vomiting, for 2 hours sudden onset. Report unable to tolerate any food or liquid intake. Pt reports abdominal pain, rate pain 10 /10. Pt denies fever at home. Pt denies dysuria, burning or frequency with urination. Pt ambulated in to ED, No acute distress noted. Pt is Alert and Oriented x 4. Pt placed on monitoring. Pt given emesis bag. Pt has call light in hand and verbalizes understanding of use, bed in lowest position and locked. Will continue to monitor.

## 2018-11-05 NOTE — ED PROVIDER NOTES
normal.  Dermatology: Skin is warm and dry  Psych: Mentation is grossly normal,cognition is grossly normal. Affect is appropriate. DIAGNOSTIC RESULTS     EKG: All [facility]'s [facility] who either signs or Co-signs this chart in the absence of a cardiologist.      RADIOLOGY:   Non-plain film imagessuch as CT, Ultrasound and MRI are read by the radiologist. Plain radiographic images are visualized and preliminarily interpreted by the emergency physician with the below findings:      [x]Radiologist's Report Reviewed:  CT ABDOMEN PELVIS WO CONTRAST   Final Result      1. No acute abnormality on noncontrast CT of the abdomen and pelvis.                ED BEDSIDE ULTRASOUND:   Performed by ED Physician - none    LABS:  Labs Reviewed   COMPREHENSIVE METABOLIC PANEL - Abnormal; Notable for the following:        Result Value    Glucose 117 (*)     Calcium 10.6 (*)     Alb 5.3 (*)     All other components within normal limits    Narrative:     Performed at:  [address] [place] [address] Laboratory  [address] [place] [address], [place] [address]   Phone [phone]   CBC WITH AUTO DIFFERENTIAL - Abnormal; Notable for the following:     MPV 11.0 (*)     Eosinophils # 0.5 (*)     All other components within normal limits    Narrative:     Performed at:  [address] [place] [address] Laboratory  [address] [place] [address]  [place], [place] [address]   Phone [phone]   AMYLASE    Narrative:     Performed at:  [address] [place] [address] Laboratory  [address] [place] [address], [address]   Phone [phone]   LIPASE    Narrative:     Performed at:  [address] [place] [address] Laboratory  [address] [place] [address], [place] [address]   Phone [phone]   LACTIC ACID, PLASMA    Narrative:     Performed at:  [address] [place] [address] Laboratory  [address] [place] [address]  [place], [place] [address]   Phone [phone] URINALYSIS       I have reviewed and interpreted all ofthe currently available lab results from this visit (if applicable):  Results for orders placed or performed during the hospital encounter of 11/05/18   Comprehensive Metabolic Panel   Result Value Ref Range    Sodium 141 136 - 145 mmol/L    Potassium 4.3 3.4 - 5.1 mmol/L    Chloride 99 98 - 107 mmol/L    CO2 30 20 - 30 mmol/L    Anion Gap 12 3 - 16    Glucose 117 (H) 74 - 106 mg/dL    BUN 13 6 - 20 mg/dL    CREATININE 0.9 0.4 - 1.2 mg/dL    GFR Non-African American >59 >59    GFR African American >59 >59    Calcium 10.6 (H) 8.5 - 10.5 mg/dL    Total Protein 8.3 6.4 - 8.3 g/dL    Alb 5.3 (H) 3.4 - 4.8 g/dL    Albumin/Globulin Ratio 1.8 0.8 - 2.0    Total Bilirubin 0.3 0.3 - 1.2 mg/dL    Alkaline Phosphatase 35 25 - 100 U/L    ALT 17 4 - 36 U/L    AST 15 8 - 33 U/L    Globulin 3.0 g/dL   CBC Auto Differential   Result Value Ref Range    WBC 9.1 4.0 - 11.0 K/uL    RBC 5.13 4.50 - 6.00 M/uL    Hemoglobin 15.3 13.0 - 18.0 g/dL    Hematocrit 47.2 40.0 - 54.0 %    MCV 92.0 80.0 - 100.0 fL    MCH 29.8 27.0 - 32.0 pg    MCHC 32.4 31.0 - 35.0 g/dL    RDW 13.1 11.0 - 16.0 %    Platelets 500 556 - 307 K/uL    MPV 11.0 (H) 6.0 - 10.0 fL    Neutrophils % 58.6 %    Immature Granulocytes % 0.3 0.0 - 5.0 %    Lymphocytes % 29.3 %    Monocytes % 5.7 %    Eosinophils % 5.4 %    Basophils % 0.7 %    Neutrophils # 5.3 2.0 - 7.5 K/uL    Immature Granulocytes # 0.0 K/uL    Lymphocytes # 2.7 1.5 - 4.0 K/uL    Monocytes # 0.5 0.2 - 0.8 K/uL    Eosinophils # 0.5 (H) 0.0 - 0.4 K/uL    Basophils # 0.1 0.0 - 0.1 K/uL   Amylase   Result Value Ref Range    Amylase 61 20 - 104 U/L   Lipase   Result Value Ref Range    Lipase 38.0 5.6 - 51.3 U/L   Lactic Acid, Plasma   Result Value Ref Range    Lactic Acid 1.8 0.4 - 2.0 mmol/L        All other labs were within normal range or not returned as of this dictation.     EMERGENCY DEPARTMENT COURSE and DIFFERENTIAL DIAGNOSIS/MDM:   Vitals:  Vitals:

## 2019-05-07 ENCOUNTER — HOSPITAL ENCOUNTER (EMERGENCY)
Facility: HOSPITAL | Age: 49
Discharge: HOME | End: 2019-05-07
Attending: EMERGENCY MEDICINE
Payer: MEDICAID

## 2019-05-07 ENCOUNTER — APPOINTMENT (OUTPATIENT)
Dept: GENERAL RADIOLOGY | Facility: HOSPITAL | Age: 49
End: 2019-05-07
Payer: MEDICAID

## 2019-05-07 VITALS
WEIGHT: 180 LBS | HEIGHT: 72 IN | BODY MASS INDEX: 24.38 KG/M2 | HEART RATE: 59 BPM | SYSTOLIC BLOOD PRESSURE: 107 MMHG | TEMPERATURE: 97.3 F | DIASTOLIC BLOOD PRESSURE: 65 MMHG | RESPIRATION RATE: 20 BRPM | OXYGEN SATURATION: 96 %

## 2019-05-07 DIAGNOSIS — R07.9 CHEST PAIN, UNSPECIFIED TYPE: Primary | ICD-10-CM

## 2019-05-07 LAB
A/G RATIO: 1.7 (ref 0.8–2)
ALBUMIN SERPL-MCNC: 4.7 G/DL (ref 3.4–4.8)
ALP BLD-CCNC: 36 U/L (ref 25–100)
ALT SERPL-CCNC: 15 U/L (ref 4–36)
ANION GAP SERPL CALCULATED.3IONS-SCNC: 10 MMOL/L (ref 3–16)
AST SERPL-CCNC: 13 U/L (ref 8–33)
BASOPHILS ABSOLUTE: 0.1 K/UL (ref 0–0.1)
BASOPHILS RELATIVE PERCENT: 0.9 %
BILIRUB SERPL-MCNC: 0.4 MG/DL (ref 0.3–1.2)
BUN BLDV-MCNC: 12 MG/DL (ref 6–20)
CALCIUM SERPL-MCNC: 9.9 MG/DL (ref 8.5–10.5)
CHLORIDE BLD-SCNC: 103 MMOL/L (ref 98–107)
CO2: 27 MMOL/L (ref 20–30)
CREAT SERPL-MCNC: 1 MG/DL (ref 0.4–1.2)
EOSINOPHILS ABSOLUTE: 0.5 K/UL (ref 0–0.4)
EOSINOPHILS RELATIVE PERCENT: 5.9 %
GFR AFRICAN AMERICAN: >59
GFR NON-AFRICAN AMERICAN: >59
GLOBULIN: 2.7 G/DL
GLUCOSE BLD-MCNC: 109 MG/DL (ref 74–106)
HCT VFR BLD CALC: 46.7 % (ref 40–54)
HEMOGLOBIN: 15.5 G/DL (ref 13–18)
IMMATURE GRANULOCYTES #: 0 K/UL
IMMATURE GRANULOCYTES %: 0.2 % (ref 0–5)
LYMPHOCYTES ABSOLUTE: 2.8 K/UL (ref 1.5–4)
LYMPHOCYTES RELATIVE PERCENT: 35 %
MCH RBC QN AUTO: 30.4 PG (ref 27–32)
MCHC RBC AUTO-ENTMCNC: 33.2 G/DL (ref 31–35)
MCV RBC AUTO: 91.6 FL (ref 80–100)
MONOCYTES ABSOLUTE: 0.5 K/UL (ref 0.2–0.8)
MONOCYTES RELATIVE PERCENT: 6.2 %
NEUTROPHILS ABSOLUTE: 4.2 K/UL (ref 2–7.5)
NEUTROPHILS RELATIVE PERCENT: 51.8 %
PDW BLD-RTO: 13.1 % (ref 11–16)
PLATELET # BLD: 263 K/UL (ref 150–400)
PMV BLD AUTO: 10.6 FL (ref 6–10)
POTASSIUM SERPL-SCNC: 4.8 MMOL/L (ref 3.4–5.1)
RBC # BLD: 5.1 M/UL (ref 4.5–6)
SODIUM BLD-SCNC: 140 MMOL/L (ref 136–145)
TOTAL PROTEIN: 7.4 G/DL (ref 6.4–8.3)
TROPONIN: <0.3 NG/ML
WBC # BLD: 8.1 K/UL (ref 4–11)

## 2019-05-07 PROCEDURE — 80053 COMPREHEN METABOLIC PANEL: CPT

## 2019-05-07 PROCEDURE — 85025 COMPLETE CBC W/AUTO DIFF WBC: CPT

## 2019-05-07 PROCEDURE — 6370000000 HC RX 637 (ALT 250 FOR IP): Performed by: EMERGENCY MEDICINE

## 2019-05-07 PROCEDURE — 99285 EMERGENCY DEPT VISIT HI MDM: CPT

## 2019-05-07 PROCEDURE — 71045 X-RAY EXAM CHEST 1 VIEW: CPT

## 2019-05-07 PROCEDURE — 36415 COLL VENOUS BLD VENIPUNCTURE: CPT

## 2019-05-07 PROCEDURE — 93005 ELECTROCARDIOGRAM TRACING: CPT

## 2019-05-07 PROCEDURE — 84484 ASSAY OF TROPONIN QUANT: CPT

## 2019-05-07 RX ORDER — ASPIRIN 81 MG/1
324 TABLET, CHEWABLE ORAL ONCE
Status: COMPLETED | OUTPATIENT
Start: 2019-05-07 | End: 2019-05-07

## 2019-05-07 RX ADMIN — ASPIRIN 81 MG 324 MG: 81 TABLET ORAL at 08:54

## 2019-05-07 ASSESSMENT — PAIN DESCRIPTION - FREQUENCY
FREQUENCY: CONTINUOUS
FREQUENCY: INTERMITTENT

## 2019-05-07 ASSESSMENT — PAIN DESCRIPTION - PROGRESSION: CLINICAL_PROGRESSION: GRADUALLY WORSENING

## 2019-05-07 ASSESSMENT — ENCOUNTER SYMPTOMS
TROUBLE SWALLOWING: 0
EYE REDNESS: 0
SINUS PRESSURE: 0
SHORTNESS OF BREATH: 0
COUGH: 0
ABDOMINAL PAIN: 0
NAUSEA: 0
CHEST TIGHTNESS: 0
CONSTIPATION: 0
EYE DISCHARGE: 0
DIARRHEA: 0
WHEEZING: 0
RHINORRHEA: 0
VOMITING: 0
BACK PAIN: 0
EYE PAIN: 0
SORE THROAT: 0

## 2019-05-07 ASSESSMENT — PAIN DESCRIPTION - DIRECTION: RADIATING_TOWARDS: LEFT ARM

## 2019-05-07 ASSESSMENT — PAIN SCALES - GENERAL
PAINLEVEL_OUTOF10: 5
PAINLEVEL_OUTOF10: 5
PAINLEVEL_OUTOF10: 8

## 2019-05-07 ASSESSMENT — PAIN DESCRIPTION - ORIENTATION
ORIENTATION: LEFT;UPPER
ORIENTATION: LEFT;MID
ORIENTATION: LEFT;MID

## 2019-05-07 ASSESSMENT — PAIN DESCRIPTION - PAIN TYPE
TYPE: ACUTE PAIN

## 2019-05-07 ASSESSMENT — PAIN DESCRIPTION - DESCRIPTORS
DESCRIPTORS: PRESSURE
DESCRIPTORS: PRESSURE
DESCRIPTORS: HEAVINESS;PRESSURE

## 2019-05-07 ASSESSMENT — PAIN DESCRIPTION - LOCATION
LOCATION: CHEST

## 2019-05-07 ASSESSMENT — PAIN - FUNCTIONAL ASSESSMENT: PAIN_FUNCTIONAL_ASSESSMENT: 0-10

## 2019-05-07 NOTE — ED PROVIDER NOTES
[address] [facility] [address]  eMERGENCY dEPARTMENT eNCOUnter      Pt Name: Michael Bailey  MRN: [id number]  [place] 1970  Date of evaluation: 5/7/2019  Provider: Shikha Puga MD    [address] [place] [address]       Chief Complaint   Patient presents with    Chest Pain         HISTORY OF PRESENT ILLNESS   (Location/Symptom, Timing/Onset, Context/Setting, Quality, Duration, Modifying Factors, Severity)  Note limiting factors. Michael Bailey is a 46 y.o. male who presents to the emergency department because of chest pain for 2 days. No N/V. No cough/cold symptoms. No history of CAD. Nursing Notes were reviewed. REVIEW OF SYSTEMS    (2-9 systems for level 4, 10 or more forlevel 5)     Review of Systems   Constitutional: Negative for chills and fever. HENT: Negative for congestion, ear pain, postnasal drip, rhinorrhea, sinus pressure, sneezing, sore throat and trouble swallowing. Eyes: Negative for pain, discharge and redness. Respiratory: Negative for cough, chest tightness, shortness of breath and wheezing. Cardiovascular: Positive for chest pain. Negative for palpitations and leg swelling. Gastrointestinal: Negative for abdominal pain, constipation, diarrhea, nausea and vomiting. Genitourinary: Negative for dysuria, flank pain, frequency, hematuria and urgency. Musculoskeletal: Negative for back pain, joint swelling and neck pain. Skin: Negative for pallor and rash. Neurological: Negative for dizziness, syncope, weakness, numbness and headaches. Psychiatric/Behavioral: Negative for confusion and hallucinations. The patient is not nervous/anxious. PAST MEDICAL HISTORY     Past Medical History:   Diagnosis Date    Chronic back pain     GERD (gastroesophageal reflux disease)     Headache          SURGICAL HISTORY     History reviewed. No pertinent surgical history.       CURRENT MEDICATIONS       Previous Medications    No medications on file       ALLERGIES     Shrimp (diagnostic) and Penicillins    FAMILY HISTORY       Family History   Problem Relation Age of Onset    Cancer Mother         LUNG    Cancer Father         LUNG          SOCIAL HISTORY       Social History     Socioeconomic History    Marital status:      Spouse name: None    Number of children: None    Years of education: None    Highest education level: None   Occupational History    None   Social Needs    Financial resource strain: None    Food insecurity:     Worry: None     Inability: None    Transportation needs:     Medical: None     Non-medical: None   Tobacco Use    Smoking status: Current Every Day Smoker     Packs/day: 0.50     Years: 40.00     Pack years: 20.00     Types: Cigarettes    Smokeless tobacco: Current User     Types: Chew   Substance and Sexual Activity    Alcohol use: No    Drug use: No    Sexual activity: None   Lifestyle    Physical activity:     Days per week: None     Minutes per session: None    Stress: None   Relationships    Social connections:     Talks on phone: None     Gets together: None     Attends Scientologist service: None     Active member of club or organization: None     Attends meetings of clubs or organizations: None     Relationship status: None    Intimate partner violence:     Fear of current or ex partner: None     Emotionally abused: None     Physically abused: None     Forced sexual activity: None   Other Topics Concern    None   Social History Narrative    None       SCREENINGS             PHYSICAL EXAM    (up to 7 for level 4, 8 or more for level 5)     ED Triage Vitals [05/07/19 0845]   BP Temp Temp src Pulse Resp SpO2 Height Weight   123/71 97.3 °F (36.3 °C) -- 62 20 98 % 6' (1.829 m) 180 lb (81.6 kg)       Physical Exam   Constitutional: He is oriented to person, place, and time. He appears well-developed and well-nourished. HENT:   Head: Normocephalic and atraumatic. Eyes: Pupils are equal, round, and reactive to light.  Conjunctivae labs were within normal range or not returned as of this dictation. EMERGENCY DEPARTMENT COURSE and DIFFERENTIAL DIAGNOSIS/MDM:   Vitals:    Vitals:    05/07/19 0845   BP: 123/71   Pulse: 62   Resp: 20   Temp: 97.3 °F (36.3 °C)   SpO2: 98%   Weight: 180 lb (81.6 kg)   Height: 6' (1.829 m)           CRITICAL CARE TIME   Total Critical Care time was  minutes, excluding separatelyreportable procedures. There was a high probability ofclinically significant/life threatening deterioration in the patient's condition which required my urgent intervention. CONSULTS:  None    PROCEDURES:  None    PROGRESS NOTES:    Cardiac work up negative. Recommend follow up with PCP for cardiology consult for stress test. Return to ED if worse. Patient understands and agrees. FINAL IMPRESSION      1. Chest pain, unspecified type          Final diagnoses:   Chest pain, unspecified type       DISPOSITION/PLAN   DISPOSITION Decision To Discharge 05/07/2019 09:30:22 AM      PATIENT REFERRED TO:  Rob Ford, APRN  [address]  [phone]      If symptoms worsen for cardiology consult.  recommend stress test.      DISCHARGE MEDICATIONS:  New Prescriptions    No medications on file         (Please note thatportions of this note may have been completed with a voice recognition program.  Efforts were [facility] edit the dictations but occasionally words are mis-transcribed.)    Cas Christie MD (electronically signed)  Attending Emergency Physician        Meena Easton MD  05/07/19 6826

## 2019-05-07 NOTE — ED TRIAGE NOTES
Patient arrives to ED with complaints of left chest wall pain that radiates into left arm down to his elbow and has soa. Ongoing for 2 days. States it is a heavy pressure at the bottom of his heart.

## 2019-05-09 ENCOUNTER — OFFICE VISIT (OUTPATIENT)
Dept: FAMILY MEDICINE CLINIC | Age: 49
End: 2019-05-09
Payer: MEDICAID

## 2019-05-09 VITALS
WEIGHT: 193.2 LBS | DIASTOLIC BLOOD PRESSURE: 66 MMHG | RESPIRATION RATE: 18 BRPM | BODY MASS INDEX: 26.17 KG/M2 | HEIGHT: 72 IN | HEART RATE: 63 BPM | OXYGEN SATURATION: 95 % | SYSTOLIC BLOOD PRESSURE: 112 MMHG

## 2019-05-09 DIAGNOSIS — K21.9 GASTROESOPHAGEAL REFLUX DISEASE WITHOUT ESOPHAGITIS: Primary | ICD-10-CM

## 2019-05-09 PROCEDURE — G8427 DOCREV CUR MEDS BY ELIG CLIN: HCPCS | Performed by: NURSE PRACTITIONER

## 2019-05-09 PROCEDURE — 3017F COLORECTAL CA SCREEN DOC REV: CPT | Performed by: NURSE PRACTITIONER

## 2019-05-09 PROCEDURE — G8417 CALC BMI ABV UP PARAM F/U: HCPCS | Performed by: NURSE PRACTITIONER

## 2019-05-09 PROCEDURE — 4004F PT TOBACCO SCREEN RCVD TLK: CPT | Performed by: NURSE PRACTITIONER

## 2019-05-09 PROCEDURE — 99214 OFFICE O/P EST MOD 30 MIN: CPT | Performed by: NURSE PRACTITIONER

## 2019-05-09 RX ORDER — RANITIDINE 150 MG/1
150 TABLET ORAL 2 TIMES DAILY
Qty: 60 TABLET | Refills: 3 | Status: CANCELLED | OUTPATIENT
Start: 2019-05-09

## 2019-05-09 RX ORDER — PANTOPRAZOLE SODIUM 40 MG/1
40 TABLET, DELAYED RELEASE ORAL
Qty: 30 TABLET | Refills: 5 | Status: SHIPPED | OUTPATIENT
Start: 2019-05-09 | End: 2020-04-28

## 2019-05-09 ASSESSMENT — ENCOUNTER SYMPTOMS
CONSTIPATION: 0
COUGH: 0
SORE THROAT: 0
HEARTBURN: 1
DIARRHEA: 0
VOMITING: 1
WATER BRASH: 0
RECTAL PAIN: 0
ABDOMINAL PAIN: 0
BLOOD IN STOOL: 0
NAUSEA: 0
ALLERGIC/IMMUNOLOGIC NEGATIVE: 1

## 2019-05-09 ASSESSMENT — PATIENT HEALTH QUESTIONNAIRE - PHQ9
SUM OF ALL RESPONSES TO PHQ QUESTIONS 1-9: 0
2. FEELING DOWN, DEPRESSED OR HOPELESS: 0
1. LITTLE INTEREST OR PLEASURE IN DOING THINGS: 0
SUM OF ALL RESPONSES TO PHQ9 QUESTIONS 1 & 2: 0
SUM OF ALL RESPONSES TO PHQ QUESTIONS 1-9: 0

## 2019-05-09 NOTE — PROGRESS NOTES
SUBJECTIVE:    Junior Blank is a 46 y.o. male    Pt request refill on Zantac 150mg BID. Pt reports he has been out of Zantac for a few days. Pt feels he needs something stronger. Pt reports he has continued to have heart burn approx 30 minutes after eating. Gastroesophageal Reflux   He complains of heartburn. He reports no abdominal pain, no chest pain, no coughing, no nausea, no sore throat or no water brash. Vomiting hot water 2-3 times per day. This is a chronic problem. The current episode started more than 1 year ago (several years). The problem occurs frequently (5-6 times per day). The problem has been waxing and waning. The heartburn is of mild intensity. The heartburn does not wake him from sleep. The heartburn does not limit his activity. The heartburn doesn't change with position. The symptoms are aggravated by caffeine and smoking. Pertinent negatives include no fatigue. Risk factors include caffeine use, smoking/tobacco exposure and lack of exercise. He has tried a histamine-2 antagonist for the symptoms. The treatment provided mild relief. Chief Complaint   Patient presents with    Gastroesophageal Reflux     Refill    Health Maintenance     Colon Cancer Screening        Review of Systems   Constitutional: Negative for fatigue. HENT: Negative. Negative for sore throat. Respiratory: Negative for cough. Cardiovascular: Negative for chest pain. Gastrointestinal: Positive for heartburn and vomiting. Negative for abdominal pain, blood in stool, constipation, diarrhea, nausea and rectal pain. Frequent heartburn   Endocrine: Negative. Genitourinary: Negative. Musculoskeletal: Negative. Allergic/Immunologic: Negative. Neurological: Negative. Hematological: Negative. Psychiatric/Behavioral: Negative.          OBJECTIVE:    /66   Pulse 63   Resp 18   Ht 6' (1.829 m)   Wt 193 lb 3.2 oz (87.6 kg)   SpO2 95%   BMI 26.20 kg/m²    Physical Exam

## 2019-06-22 ENCOUNTER — HOSPITAL ENCOUNTER (EMERGENCY)
Facility: HOSPITAL | Age: 49
Discharge: HOME | End: 2019-06-22
Attending: FAMILY MEDICINE
Payer: MEDICAID

## 2019-06-22 ENCOUNTER — APPOINTMENT (OUTPATIENT)
Dept: GENERAL RADIOLOGY | Facility: HOSPITAL | Age: 49
End: 2019-06-22
Payer: MEDICAID

## 2019-06-22 VITALS
HEIGHT: 67 IN | DIASTOLIC BLOOD PRESSURE: 78 MMHG | WEIGHT: 180 LBS | HEART RATE: 70 BPM | RESPIRATION RATE: 16 BRPM | OXYGEN SATURATION: 96 % | SYSTOLIC BLOOD PRESSURE: 134 MMHG | BODY MASS INDEX: 28.25 KG/M2 | TEMPERATURE: 98.6 F

## 2019-06-22 DIAGNOSIS — J40 BRONCHITIS: Primary | ICD-10-CM

## 2019-06-22 PROCEDURE — 99283 EMERGENCY DEPT VISIT LOW MDM: CPT

## 2019-06-22 PROCEDURE — 71045 X-RAY EXAM CHEST 1 VIEW: CPT

## 2019-06-22 RX ORDER — LEVOFLOXACIN 250 MG/1
250 TABLET ORAL DAILY
Qty: 10 TABLET | Refills: 0 | Status: SHIPPED | OUTPATIENT
Start: 2019-06-22 | End: 2019-07-02

## 2019-06-22 RX ORDER — HYDROCODONE POLISTIREX AND CHLORPHENIRAMINE POLISTIREX 10; 8 MG/5ML; MG/5ML
5 SUSPENSION, EXTENDED RELEASE ORAL EVERY 12 HOURS PRN
Qty: 30 ML | Refills: 0 | Status: SHIPPED | OUTPATIENT
Start: 2019-06-22 | End: 2019-06-25

## 2019-06-22 ASSESSMENT — ENCOUNTER SYMPTOMS
NAUSEA: 0
SHORTNESS OF BREATH: 0
VOMITING: 0
ABDOMINAL PAIN: 0
COUGH: 1

## 2019-06-22 NOTE — ED PROVIDER NOTES
[address] [place] [address] Court  eMERGENCY dEPARTMENT eNCOUnter      Pt Name: Michael Bailey  MRN: [id number]  [place] 1970  Date of evaluation: 6/22/2019  Provider: Odilia Stevenson MD    [address] [place] [address]       Chief Complaint   Patient presents with    Cough         HISTORY OF PRESENT ILLNESS   (Location/Symptom, Timing/Onset, Context/Setting, Quality, Duration, Modifying Factors, Severity)  Note limiting factors. Michael Bailey is a 46 y.o. male who presents to the emergency department with a 24-hour history of productive cough and subjective fever. Patient denies chest pain. He denies difficulty breathing. He smokes about 5 or 6 cigarettes a day. Nursing Notes were reviewed. REVIEW OF SYSTEMS    (2-9 systems for level 4, 10 or more forlevel 5)     Review of Systems   Constitutional: Positive for chills and fever. Respiratory: Positive for cough. Negative for shortness of breath. Cardiovascular: Negative for chest pain. Gastrointestinal: Negative for abdominal pain, nausea and vomiting. Except as noted above the remainder of the review of systems was reviewed and negative. PAST MEDICAL HISTORY     Past Medical History:   Diagnosis Date    Chronic back pain     GERD (gastroesophageal reflux disease)     Headache          SURGICAL HISTORY     History reviewed. No pertinent surgical history.       CURRENT MEDICATIONS       Previous Medications    PANTOPRAZOLE (PROTONIX) 40 MG TABLET    Take 1 tablet by mouth every morning (before breakfast)       ALLERGIES     Shrimp (diagnostic) and Penicillins    FAMILY HISTORY       Family History   Problem Relation Age of Onset    Cancer Mother         LUNG    Cancer Father         LUNG          SOCIAL HISTORY       Social History     Socioeconomic History    Marital status:      Spouse name: None    Number of children: None    Years of education: None    Highest education level: None   Occupational History    None   Social RESULTS     EKG: All EKG's are interpreted by the Emergency Department Physician who either signs or Co-signs this chart in the absence of a cardiologist.        RADIOLOGY:   Non-plain film images such as CT, Ultrasound and MRI are read by the radiologist. Plainradiographic images are visualized and preliminarily interpreted by the emergency physician with the below findings:        Interpretation per the Radiologist below, if available at the time of this note:    XR CHEST PORTABLE    (Results Pending)         ED BEDSIDE ULTRASOUND:   Performed by ED Physician - none    LABS:  Labs Reviewed - No data to display    All other labs were within normal range or not returned as of this dictation. EMERGENCY DEPARTMENT COURSE and DIFFERENTIALDIAGNOSIS/MDM:   Vitals:    Vitals:    06/22/19 1424   BP: 134/78   Pulse: 70   Resp: 16   Temp: 98.6 °F (37 °C)   SpO2: 96%   Weight: 180 lb (81.6 kg)   Height: 5' 7\" (1.702 m)           CRITICALCARE TIME   Total Critical Care time was 0 minutes, excludingseparately reportable procedures. There was a high probabilityof clinically significant/life threatening deterioration in the patient's condition which required my urgent intervention. CONSULTS:  None    PROCEDURES:  None    FINAL IMPRESSION      1. Bronchitis        DISPOSITION/PLAN   DISPOSITION Decision To Discharge 06/22/2019 02:54:13 PM      PATIENT REFERRED TO:  JEFFERY Wheat  [address]  [phone]    In 3 days  If symptoms worsen      DISCHARGE MEDICATIONS:  New Prescriptions    HYDROCODONE-CHLORPHENIRAMINE (TUSSIONEX PENNKINETIC ER) 10-8 MG/5ML SUER    Take 5 mLs by mouth every 12 hours as needed (cough) for up to 3 days.     LEVOFLOXACIN (LEVAQUIN) 250 MG TABLET    Take 1 tablet by mouth daily for 10 days       (Please note that portions ofthis note were completed with a voice recognition program.  Efforts were made to edit the dictations but occasionally words are mis-transcribed.)    Yari Haskins MD(electronically signed)  Attending Emergency Physician          Yari Haskins MD  06/22/19 6261

## 2020-04-28 RX ORDER — PANTOPRAZOLE SODIUM 40 MG/1
TABLET, DELAYED RELEASE ORAL
Qty: 30 TABLET | Refills: 0 | Status: SHIPPED | OUTPATIENT
Start: 2020-04-28 | End: 2021-08-20

## 2021-06-19 ENCOUNTER — HOSPITAL ENCOUNTER (EMERGENCY)
Facility: HOSPITAL | Age: 51
Discharge: HOME | End: 2021-06-19
Attending: EMERGENCY MEDICINE
Payer: MEDICAID

## 2021-06-19 VITALS
HEIGHT: 67 IN | OXYGEN SATURATION: 98 % | HEART RATE: 65 BPM | SYSTOLIC BLOOD PRESSURE: 118 MMHG | RESPIRATION RATE: 18 BRPM | WEIGHT: 205 LBS | BODY MASS INDEX: 32.18 KG/M2 | TEMPERATURE: 98.5 F | DIASTOLIC BLOOD PRESSURE: 82 MMHG

## 2021-06-19 DIAGNOSIS — L03.116 CELLULITIS OF LEFT LOWER EXTREMITY: Primary | ICD-10-CM

## 2021-06-19 PROCEDURE — 6370000000 HC RX 637 (ALT 250 FOR IP): Performed by: EMERGENCY MEDICINE

## 2021-06-19 PROCEDURE — 99283 EMERGENCY DEPT VISIT LOW MDM: CPT

## 2021-06-19 RX ORDER — CEPHALEXIN 500 MG/1
500 CAPSULE ORAL ONCE
Status: COMPLETED | OUTPATIENT
Start: 2021-06-19 | End: 2021-06-19

## 2021-06-19 RX ORDER — SULFAMETHOXAZOLE AND TRIMETHOPRIM 800; 160 MG/1; MG/1
1 TABLET ORAL ONCE
Status: COMPLETED | OUTPATIENT
Start: 2021-06-19 | End: 2021-06-19

## 2021-06-19 RX ORDER — SULFAMETHOXAZOLE AND TRIMETHOPRIM 800; 160 MG/1; MG/1
1 TABLET ORAL 2 TIMES DAILY
Qty: 14 TABLET | Refills: 0 | Status: SHIPPED | OUTPATIENT
Start: 2021-06-19 | End: 2021-06-26

## 2021-06-19 RX ORDER — CEPHALEXIN 500 MG/1
500 CAPSULE ORAL 4 TIMES DAILY
Qty: 28 CAPSULE | Refills: 0 | Status: SHIPPED | OUTPATIENT
Start: 2021-06-19 | End: 2021-06-26

## 2021-06-19 RX ADMIN — SULFAMETHOXAZOLE AND TRIMETHOPRIM 1 TABLET: 800; 160 TABLET ORAL at 09:35

## 2021-06-19 RX ADMIN — CEPHALEXIN 500 MG: 500 CAPSULE ORAL at 09:35

## 2021-06-19 ASSESSMENT — PAIN SCALES - GENERAL: PAINLEVEL_OUTOF10: 6

## 2021-06-19 NOTE — ED PROVIDER NOTES
Family:     Attends Scientology Services:     Active Member of Clubs or Organizations:     Attends Club or Organization Meetings:     Marital Status:    Intimate Partner Violence:     Fear of Current or Ex-Partner:     Emotionally Abused:     Physically Abused:     Sexually Abused:      Current Facility-Administered Medications   Medication Dose Route Frequency Provider Last Rate Last Admin    cephALEXin (KEFLEX) capsule 500 mg  500 mg Oral Once Betty Cardozo MD        sulfamethoxazole-trimethoprim (BACTRIM DS;SEPTRA DS) 800-160 MG per tablet 1 tablet  1 tablet Oral Once Betty Cardozo MD         Current Outpatient Medications   Medication Sig Dispense Refill    sulfamethoxazole-trimethoprim (BACTRIM DS;SEPTRA DS) 800-160 MG per tablet Take 1 tablet by mouth 2 times daily for 7 days 14 tablet 0    cephALEXin (KEFLEX) 500 MG capsule Take 1 capsule by mouth 4 times daily for 7 days 28 capsule 0    pantoprazole (PROTONIX) 40 MG tablet TAKE 1 TABLET BY MOUTH EVERY MORNING 30 tablet 0     Allergies   Allergen Reactions    Shrimp (Diagnostic) Shortness Of Breath and Swelling    Penicillins Rash     Nursing Notes Reviewed    ROS:  At least 10 systems reviewed and otherwise negative except as in the [place]. Physical Exam:  ED Triage Vitals [06/19/21 0914]   Enc Vitals Group      /82      Pulse 69      Resp 18      Temp 98.5 °F (36.9 °C)      Temp Source Oral      SpO2       Weight 205 lb (93 kg)      Height 5' 7\" (1.702 m)      Head Circumference       Peak Flow       Pain Score       Pain Loc       Pain Edu? Excl. in [address]? My pulse oximetry interpretation is which is within the normal range    GENERAL APPEARANCE: Awake and alert. Cooperative. No acute distress. HEAD:  Atraumatic. EYES: EOM's grossly intact. ENT: Mucous membranes are moist.  No trismus. NECK:  Trachea midline. EXTREMITIES: No acute deformities.   SKIN: 4cm area of cellulitis to left lateral ankle with open wound in the center. Healed scab to left neck. No overlying erythema to neck. No induration or fluctuance to either wound      I have reviewed and interpreted all of the currently available lab results from this visit (if applicable):  No results found for this visit on 06/19/21. EKG: (All EKG's are interpreted by myself in the absence of a cardiologist)      MDM:  Patient has an area of cellulitis to the left lateral ankle. There is no fluctuance or abscess to drain. I did outline the wound and the cellulitis. Wound was covered with a clean dressing. I will start the patient on Bactrim and Keflex. He was instructed not to scratch at the area and to follow-up with his PCP. Given strict return precautions. Clinical Impression:  1.  Cellulitis of left lower extremity        Disposition Vitals:  [unfilled], [unfilled], [unfilled], [unfilled]    Disposition referral (if applicable):  Loida Lozano, JEFFERY  [address] [facility] [address] [address] [phone]    Schedule an appointment as soon as possible for a visit in 2 days  If symptoms worsen      Disposition medications (if applicable):  New Prescriptions    CEPHALEXIN (KEFLEX) 500 MG CAPSULE    Take 1 capsule by mouth 4 times daily for 7 days    SULFAMETHOXAZOLE-TRIMETHOPRIM (BACTRIM DS;SEPTRA DS) 800-160 MG PER TABLET    Take 1 tablet by mouth 2 times daily for 7 days         (Please note that portions of this note may have been completed with a voice recognition program. Efforts were made to edit the dictations but occasionally words are mis-transcribed.)    MD Betty Taylor MD  06/19/21 9234

## 2021-08-20 ENCOUNTER — HOSPITAL ENCOUNTER (EMERGENCY)
Facility: HOSPITAL | Age: 51
Discharge: HOME | End: 2021-08-20
Attending: FAMILY MEDICINE
Payer: MEDICAID

## 2021-08-20 VITALS
BODY MASS INDEX: 31.83 KG/M2 | TEMPERATURE: 98.9 F | HEART RATE: 80 BPM | HEIGHT: 68 IN | RESPIRATION RATE: 18 BRPM | SYSTOLIC BLOOD PRESSURE: 100 MMHG | WEIGHT: 210 LBS | OXYGEN SATURATION: 97 % | DIASTOLIC BLOOD PRESSURE: 84 MMHG

## 2021-08-20 DIAGNOSIS — J20.9 ACUTE BRONCHITIS, UNSPECIFIED ORGANISM: Primary | ICD-10-CM

## 2021-08-20 LAB
RAPID INFLUENZA  B AGN: NEGATIVE
RAPID INFLUENZA A AGN: NEGATIVE
SARS-COV-2, NAAT: NOT DETECTED

## 2021-08-20 PROCEDURE — 87804 INFLUENZA ASSAY W/OPTIC: CPT

## 2021-08-20 PROCEDURE — 87635 SARS-COV-2 COVID-19 AMP PRB: CPT

## 2021-08-20 PROCEDURE — 99282 EMERGENCY DEPT VISIT SF MDM: CPT

## 2021-08-20 RX ORDER — AZITHROMYCIN 250 MG/1
TABLET, FILM COATED ORAL
Qty: 6 TABLET | Refills: 0 | Status: SHIPPED | OUTPATIENT
Start: 2021-08-20 | End: 2021-10-02

## 2021-08-20 ASSESSMENT — ENCOUNTER SYMPTOMS: COUGH: 1

## 2021-08-21 NOTE — ED NOTES
Patient with c/o productive cough x 1 1/2 days, patient states that he is coughing up yellow and green sputum.      Jesusita Holloway RN  08/20/21 2048

## 2021-08-21 NOTE — ED PROVIDER NOTES
[address] [place] [address] Court  eMERGENCY dEPARTMENT eNCOUnter      Pt Name: Bernardo Johnson  MRN: [id number]  [place] 1970  Date of evaluation: 8/20/2021  Provider: Delaney Bradley, [address] [place] [address]       Chief Complaint   Patient presents with    Cough         HISTORY OF PRESENT ILLNESS   (Location/Symptom, Timing/Onset, Context/Setting, Quality, Duration, Modifying Factors, Severity)  Note limiting factors. Bernardo Johnson is a 46 y.o. male who presents to the emergency department for cough. Pt has been coughing for 1 1/2 days. Now coughing up yellow and green sputum. No fever. No shortness of breath. No sore throat, no URI symptoms. Pt doesn't smoke. No wheezing. No COVID exposure. No chest pain. Nursing Notes were reviewed. REVIEW OF SYSTEMS    (2-9 systems for level 4, 10 or more forlevel 5)     Review of Systems   Respiratory: Positive for cough. All other systems reviewed and are negative. PAST MEDICAL HISTORY     Past Medical History:   Diagnosis Date    Chronic back pain     GERD (gastroesophageal reflux disease)     Headache          SURGICAL HISTORY     History reviewed. No pertinent surgical history. CURRENT MEDICATIONS       Previous Medications    No medications on file       ALLERGIES     Shrimp (diagnostic) and Penicillins    FAMILY HISTORY       Family History   Problem Relation Age of Onset    Cancer Mother         LUNG    Cancer Father         LUNG          SOCIAL HISTORY       Social History     Socioeconomic History    Marital status:      Spouse name: None    Number of children: None    Years of education: None    Highest education level: None   Occupational History    None   Tobacco Use    Smoking status: Current Every Day Smoker     Packs/day: 0.50     Years: 40.00     Pack years: 20.00     Types: Cigarettes    Smokeless tobacco: Current User     Types: Chew   Substance and Sexual Activity    Alcohol use: No    Drug use:  No  Sexual activity: None   Other Topics Concern    None   Social History Narrative    None     Social Determinants of Health     Financial Resource Strain:     Difficulty of Paying Living Expenses:    Food Insecurity:     Worried About Running Out of Food in the Last Year:     [address] in the Last Year:    Transportation Needs:     Lack of Transportation (Medical):  Lack of Transportation (Non-Medical):    Physical Activity:     Days of Exercise per Week:     Minutes of Exercise per Session:    Stress:     Feeling of Stress :    Social Connections:     Frequency of Communication with Friends and Family:     Frequency of Social Gatherings with Friends and Family:     Attends Spiritism Services:     Active Member of Clubs or Organizations:     Attends Club or Organization Meetings:     Marital Status:    Intimate Partner Violence:     Fear of Current or Ex-Partner:     Emotionally Abused:     Physically Abused:     Sexually Abused:        SCREENINGS             PHYSICAL EXAM    (up to 7 for level 4, 8 or more for level 5)     ED Triage Vitals [08/20/21 2044]   BP Temp Temp Source Pulse Resp SpO2 Height Weight   (!) 147/95 98.9 °F (37.2 °C) Oral 80 18 99 % 5' 8\" (1.727 m) 210 lb (95.3 kg)       Physical Exam  Vitals and nursing note reviewed. Constitutional:       General: He is not in acute distress. Appearance: Normal appearance. HENT:      Head: Normocephalic. Right Ear: Tympanic membrane normal.      Left Ear: Tympanic membrane normal.      Nose: Nose normal.      Mouth/Throat:      Mouth: Mucous membranes are moist.      Pharynx: Oropharynx is clear. Eyes:      Extraocular Movements: Extraocular movements intact. Conjunctiva/sclera: Conjunctivae normal.      Pupils: Pupils are equal, round, and reactive to light. Cardiovascular:      Rate and Rhythm: Normal rate and regular rhythm. Heart sounds: Normal heart sounds.    Pulmonary:      Effort: Pulmonary effort is normal.      Breath sounds: Normal breath sounds. No wheezing or rhonchi. Musculoskeletal:         General: Normal range of motion. Cervical back: Neck supple. Skin:     General: Skin is warm and dry. Neurological:      Mental Status: He is alert and oriented to person, place, and time. Psychiatric:         Mood and Affect: Mood normal.         Behavior: Behavior normal.         DIAGNOSTIC RESULTS     EKG: All EKG's are interpreted by the Emergency Department Physician who either signs or Co-signs this chart in the absence of a cardiologist.    None    RADIOLOGY:   Non-plain film images such as CT, Ultrasound and MRI are read by the radiologist. Plain radiographic images are visualized andpreliminarily interpreted by the emergency physician with the below findings:    None    Interpretationper the Radiologist below, if available at the time of this note:    No orders to display         ED BEDSIDE ULTRASOUND:   Performed by ED Physician - none    LABS:  Labs Reviewed   RAPID INFLUENZA A/B ANTIGENS    Narrative:     Performed at:  [facility] [facility]  [address] [place] [address], [place] [address]   Phone [phone], RAPID    Narrative:     Performed at:  [facility]  [address] [place] [address], [place] [address]   Phone [phone]       All other labs were within normal range or not returned as of this dictation. EMERGENCY DEPARTMENT COURSE and DIFFERENTIAL DIAGNOSIS/MDM:   Vitals:    Vitals:    08/20/21 2044   BP: (!) 147/95   Pulse: 80   Resp: 18   Temp: 98.9 °F (37.2 °C)   TempSrc: Oral   SpO2: 99%   Weight: 210 lb (95.3 kg)   Height: 5' 8\" (1.727 m)           CRITICAL CARE TIME   Total Critical Care time was 0 minutes, excluding separatelyreportable procedures.   There was a high probability ofclinically significant/life threatening deterioration in the patient's condition which required my urgent intervention. CONSULTS:  None    PROCEDURES:  None    PROGRESS NOTES:    Pt with cough that has become color changed over 2 days. Will give antibiotic to hold since it is the weekend and told him to fill if the symptoms worsen by Sunday    FINAL IMPRESSION      1. Acute bronchitis, unspecified organism New Problem         DISPOSITION/PLAN   DISPOSITION Decision To Discharge 08/20/2021 09:58:20 PM      PATIENT REFERRED TO:    Follow up with PCP in next 1 week if symptoms worsen.           DISCHARGE MEDICATIONS:  New Prescriptions    AZITHROMYCIN (ZITHROMAX) 250 MG TABLET    Take 2 tabs on day 1, then 1 tab daily for next 4 days       (Please note that portions of this note were completed with a voice recognition program.  Efforts were made to edit the dictations but occasionallywords are mis-transcribed.)    Macy Tabor DO (electronically signed)  Attending Emergency Physician          Macy Tabor DO  08/20/21 7813

## 2021-10-02 ENCOUNTER — HOSPITAL ENCOUNTER (EMERGENCY)
Facility: HOSPITAL | Age: 51
Discharge: HOME | End: 2021-10-02
Attending: EMERGENCY MEDICINE
Payer: MEDICAID

## 2021-10-02 VITALS
HEART RATE: 62 BPM | HEIGHT: 72 IN | WEIGHT: 215 LBS | TEMPERATURE: 98.3 F | BODY MASS INDEX: 29.12 KG/M2 | OXYGEN SATURATION: 98 % | DIASTOLIC BLOOD PRESSURE: 81 MMHG | SYSTOLIC BLOOD PRESSURE: 127 MMHG | RESPIRATION RATE: 18 BRPM

## 2021-10-02 DIAGNOSIS — R09.89 ABDOMINAL BRUIT: Primary | ICD-10-CM

## 2021-10-02 DIAGNOSIS — R03.0 ELEVATED BLOOD PRESSURE READING: ICD-10-CM

## 2021-10-02 PROCEDURE — 6370000000 HC RX 637 (ALT 250 FOR IP): Performed by: EMERGENCY MEDICINE

## 2021-10-02 PROCEDURE — 99283 EMERGENCY DEPT VISIT LOW MDM: CPT

## 2021-10-02 RX ORDER — LISINOPRIL 20 MG/1
10 TABLET ORAL 2 TIMES DAILY
Qty: 60 TABLET | Refills: 5 | Status: SHIPPED | OUTPATIENT
Start: 2021-10-02 | End: 2022-01-28

## 2021-10-02 RX ORDER — LISINOPRIL 5 MG/1
10 TABLET ORAL ONCE
Status: COMPLETED | OUTPATIENT
Start: 2021-10-02 | End: 2021-10-02

## 2021-10-02 RX ADMIN — LISINOPRIL 10 MG: 5 TABLET ORAL at 09:06

## 2021-10-02 ASSESSMENT — PAIN SCALES - GENERAL: PAINLEVEL_OUTOF10: 0

## 2021-10-02 NOTE — ED PROVIDER NOTES
[address]  eMERGENCY dEPARTMENT eNCOUnter      Pt Name: Ralph Ugarte  MRN: [id number]  [place]: 1970  Date ofevaluation: 80/0/6305  Provider: Rachael Nieto MD    CHIEF COMPLAINT       Chief Complaint   Patient presents with    Other     patient has 2 spots on his abd that beats like his heart, ongoing for 2 days, no sleep because it wakes him up. denies pain         HISTORY OF PRESENT ILLNESS  (Location/Symptom, Timing/Onset, Context/Setting, Quality, Duration, Modifying Factors, Severity.)   Ralph Ugarte is a 46 y.o. male who presents to the emergency department with abdominal pulsations starting 3 days ago. He's been having problems sleeping because it's waking him up. He denies pain but doesn't like the feeling of the pulsations with his heart beat. He denies abdominal pain. No chest pain. Nursing notes were reviewed. REVIEW OF SYSTEMS    (2-9systems for level 4, 10 or more for level 5)   ROS:  General:  No fevers, no chills, no weakness  HEENT: No sore throat, runny nose or ear pain  Cardiovascular:  No chest pain, no palpitations  Respiratory:  No shortness of breath, no cough, no wheezing  Gastrointestinal:  + abdominal pulsations; no pain, no nausea, no vomiting, no diarrhea  Musculoskeletal:  No muscle pain, no joint pain  Skin:  No rash, no easy bruising  Genitourinary:  No dysuria, no hematuria    Except as noted above theremainder of the review of systems was reviewed and negative. PASTMEDICAL HISTORY     Past Medical History:   Diagnosis Date    Chronic back pain     GERD (gastroesophageal reflux disease)     Headache          SURGICAL HISTORY     History reviewed. No pertinent surgical history.       CURRENT MEDICATIONS       Discharge Medication List as of 10/2/2021  8:59 AM          ALLERGIES     Shrimp (diagnostic) and Penicillins    FAMILY HISTORY       Family History   Problem Relation Age of Onset    Cancer Mother         LUNG    Cancer Father         LUNG          SOCIAL HISTORY       Social History     Socioeconomic History    Marital status:      Spouse name: None    Number of children: None    Years of education: None    Highest education level: None   Occupational History    None   Tobacco Use    Smoking status: Current Every Day Smoker     Packs/day: 0.50     Years: 40.00     Pack years: 20.00     Types: Cigarettes    Smokeless tobacco: Current User     Types: Chew   Substance and Sexual Activity    Alcohol use: No    Drug use: No    Sexual activity: None   Other Topics Concern    None   Social History Narrative    None     Social Determinants of Health     Financial Resource Strain:     Difficulty of Paying Living Expenses:    Food Insecurity:     Worried About Running Out of Food in the Last Year:     Ran Out of Food in the Last Year:    Transportation Needs:     Lack of Transportation (Medical):  Lack of Transportation (Non-Medical):    Physical Activity:     Days of Exercise per Week:     Minutes of Exercise per Session:    Stress:     Feeling of Stress :    Social Connections:     Frequency of Communication with Friends and Family:     Frequency of Social Gatherings with Friends and Family:     Attends Hinduism Services:     Active Member of Clubs or Organizations:     Attends Club or Organization Meetings:     Marital Status:    Intimate Partner Violence:     Fear of Current or Ex-Partner:     Emotionally Abused:     Physically Abused:     Sexually Abused:          PHYSICAL EXAM    (up to 7 forlevel 4, 8 or more for level 5)     ED Triage Vitals   BP Temp Temp src Pulse Resp SpO2 Height Weight   -- -- -- -- -- -- -- --       Physical Exam  General :Patient is awake, alert, oriented, in no acute distress, nontoxic appearing  HEENT: Pupils are equally round and reactive to light, EOMI.    Cardiac: Heart regular rate, rhythm, no murmurs, rubs, or gallops  Lungs: Lungs are clear to auscultation, there is no wheezing, rhonchi, or rales. Abdomen:Abdomen is soft, nontender, nondistended. Musculoskeletal: Ambulatory  Back: No midline or bony tenderness. Dermatology: Skin is warm and dry  Psych: Mentation is grossly normal, cognition is grossly normal. Affect is appropriate. DIAGNOSTIC RESULTS     EKG:  NSR  Rate of 61  Normal     RADIOLOGY:   Non-plain film images such as CT, Ultrasoundand MRI are read by the radiologist. Plain radiographic images are visualized and preliminarily interpreted by the emergency physician with the below findings:      [] Radiologist's Report Reviewed:  No orders to display         ED BEDSIDE ULTRASOUND:   Performed by ED Physician -see below    LABS:  Labs Reviewed - No data to display    I have reviewed and interpreted all of the currently available lab resultsfrom this visit (if applicable):  No results found for this visit on 10/02/21. All other labs were within normal range or not returned as of this dictation. EMERGENCY DEPARTMENT COURSE and DIFFERENTIAL DIAGNOSIS/MDM:   Vitals:    Vitals:    10/02/21 0843 10/02/21 0908   BP: (!) 145/90 127/81   Pulse: 68 62   Resp: 16 18   Temp: 98.3 °F (36.8 °C)    TempSrc: Oral    SpO2: 98% 98%   Weight: 215 lb (97.5 kg)    Height: 6' (1.829 m)          Bedside ultrasound demonstrated a normal caliber aorta measuring at 1.54 cm in the area of greatest abdominal pulsation sensation. Aorta it was normal size from sternum to groin. There was a lot of gas and stool visible on bedside ultrasound which may account for some of his symptoms although I think the patient is just feeling his heartbeat through his aorta. Patient's diastolic blood pressure is elevated in the ED. He is not on any antihypertensive. I believe that he has an intensified feeling of his aorta due to this diastolic blood pressure.   We will give him 1 dose of lisinopril 10 mg in the emergency department and a short-term prescription until he can follow-up with his PCP. The patient will follow-up with their PCP in 1-2 days for reevaluation. If the patient or family members have any further concerns or any worsening symptoms they will return to the ED for reevaluation. CONSULTS:  None    PROCEDURES:  Procedures    CRITICAL CARE TIME    Total Critical Care time was 0 minutes, excluding separately reportable procedures. There was a high probability of clinically significant/life threatening deterioration in the patient's condition which required my urgent intervention. FINAL IMPRESSION      1. Abdominal bruit    2. Elevated blood pressure reading          DISPOSITION/PLAN   DISPOSITION Decision To Discharge 10/02/2021 08:58:00 AM      PATIENT REFERRED TO:  Jason Paige, APRN  [address] [facility] [place]  [phone]    Schedule an appointment as soon as possible for a visit in 2 days  As needed      DISCHARGE MEDICATIONS:  Discharge Medication List as of 10/2/2021  8:59 AM      START taking these medications    Details   lisinopril (PRINIVIL;ZESTRIL) 20 MG tablet Take 0.5 tablets by mouth 2 times daily, Disp-60 tablet, R-5Normal             Comment: Please note this report has been produced using speech recognition software and may contain errors related tothat system including errors in grammar, punctuation, and spelling, as well as words and phrases that may be inappropriate. If there are any questions or concerns please feel free to contact the dictating provider forclarification.     Jimmy Gallagher MD  Attending Emergency Physician                  Jimmy Gallagher MD  66/55/36 0219       Jimmy Gallagher MD  10/02/21 3375

## 2021-10-02 NOTE — ED NOTES
AVS and Rx reviewed with patient, understanding verbalized. Patient discharged home with no further needs or concerns voiced. pcp follow up recommended.      Lydia Adan RN  10/02/21 8324

## 2021-10-04 ENCOUNTER — OFFICE VISIT (OUTPATIENT)
Dept: PRIMARY CARE CLINIC | Age: 51
End: 2021-10-04
Payer: MEDICAID

## 2021-10-04 VITALS
HEART RATE: 61 BPM | TEMPERATURE: 97.9 F | DIASTOLIC BLOOD PRESSURE: 89 MMHG | BODY MASS INDEX: 28.35 KG/M2 | OXYGEN SATURATION: 98 % | SYSTOLIC BLOOD PRESSURE: 134 MMHG | WEIGHT: 209 LBS

## 2021-10-04 DIAGNOSIS — K21.9 GASTROESOPHAGEAL REFLUX DISEASE, UNSPECIFIED WHETHER ESOPHAGITIS PRESENT: ICD-10-CM

## 2021-10-04 DIAGNOSIS — R14.1 GAS PAIN: ICD-10-CM

## 2021-10-04 DIAGNOSIS — I10 ESSENTIAL HYPERTENSION: Primary | ICD-10-CM

## 2021-10-04 PROCEDURE — 3017F COLORECTAL CA SCREEN DOC REV: CPT | Performed by: NURSE PRACTITIONER

## 2021-10-04 PROCEDURE — 4004F PT TOBACCO SCREEN RCVD TLK: CPT | Performed by: NURSE PRACTITIONER

## 2021-10-04 PROCEDURE — G8419 CALC BMI OUT NRM PARAM NOF/U: HCPCS | Performed by: NURSE PRACTITIONER

## 2021-10-04 PROCEDURE — 99213 OFFICE O/P EST LOW 20 MIN: CPT | Performed by: NURSE PRACTITIONER

## 2021-10-04 PROCEDURE — G8484 FLU IMMUNIZE NO ADMIN: HCPCS | Performed by: NURSE PRACTITIONER

## 2021-10-04 PROCEDURE — G8427 DOCREV CUR MEDS BY ELIG CLIN: HCPCS | Performed by: NURSE PRACTITIONER

## 2021-10-04 RX ORDER — SIMETHICONE 80 MG
80 TABLET,CHEWABLE ORAL EVERY 6 HOURS PRN
Qty: 60 TABLET | Refills: 1 | Status: SHIPPED | OUTPATIENT
Start: 2021-10-04

## 2021-10-04 RX ORDER — PANTOPRAZOLE SODIUM 40 MG/1
40 TABLET, DELAYED RELEASE ORAL
Qty: 90 TABLET | Refills: 1 | Status: SHIPPED | OUTPATIENT
Start: 2021-10-04

## 2021-10-04 NOTE — PATIENT INSTRUCTIONS
· Keep a list of your medicines with you. List all of the prescription medicines, nonprescription medicines, supplements, natural remedies, and vitamins that you take. Tell your healthcare providers who treat you about all of the products you are taking. Your provider can provide you with a form to keep track of them. Just ask. · Follow the directions that come with your medicine, including information about food or alcohol. Make sure you know how and when to take your medicine. Do not take more or less than you are supposed to take. · Keep all medicines out of the reach of children. · Store medicines according to the directions on the label. · Monitor yourself. Learn to know how your body reacts to your new medicine and keep track of how it makes you feel before attempting (If your provider has allowed you to do so) to drive or go to work. · Seek emergency medical attention if you think you have used too much of this medicine. An overdose of any prescription medicine can be fatal. Overdose symptoms may include extreme drowsiness, muscle weakness, confusion, cold and clammy skin, pinpoint pupils, shallow breathing, slow heart rate, fainting, or coma. · Don't share prescription medicines with others, even when they seem to have the same symptoms. What may be good for you may be harmful to others. · If you are no longer taking a prescribed medication and you have pills left please take your pills out of their original containers. Mix crushed pills with an undesirable substance, such as cat litter or used coffee grounds. Put the mixture into a disposable container with a lid, such as an empty margarine tub, or into a sealable bag. Cover up or remove any of your personal information on the empty containers by covering it with black permanent marker or duct tape. Place the sealed container with the mixture, and the empty drug containers, in the trash.    · If you use a medication that is in the form of a patch, dispose of used patches by folding them in half so that the sticky sides meet, and then flushing them down a toilet. They should not be placed in the household trash where children or pets can find them. · If you have any questions, ask your provider or pharmacist for more information. · Be sure to keep all appointments for provider visits or tests. We are committed to providing you with the best care possible. In order to help us achieve these goals please remember to bring all medications, herbal products, and over the counter supplements with you to each visit. If your provider has ordered testing for you, please be sure to follow up with our office if you have not received results within 7 days after the testing took place. *If you receive a survey after visiting one of our offices, please take time to share your experience concerning your physician office visit. These surveys are confidential and no health information about you is shared. We are eager to improve for you and we are counting on your feedback to help make that happen. Patient Education        Heart-Healthy Diet: Care Instructions  Your Care Instructions     A heart-healthy diet has lots of vegetables, fruits, nuts, beans, and whole grains, and is low in salt. It limits foods that are high in saturated fat, such as meats, cheeses, and fried foods. It may be hard to change your diet, but even small changes can lower your risk of heart attack and heart disease. Follow-up care is a key part of your treatment and safety. Be sure to make and go to all appointments, and call your doctor if you are having problems. It's also a good idea to know your test results and keep a list of the medicines you take. How can you care for yourself at home? Watch your portions  · Use food labels to learn what the recommended servings are for the foods you eat. · Eat only the number of calories you need to stay at a healthy weight.  If you need to lose weight, eat fewer calories than your body burns (through exercise and other physical activity). Eat more fruits and vegetables  · Eat a variety of fruit and vegetables every day. Dark green, deep orange, red, or yellow fruits and vegetables are especially good for you. Examples include spinach, carrots, peaches, and berries. · Keep carrots, celery, and other veggies handy for snacks. Buy fruit that is in season and store it where you can see it so that you will be tempted to eat it. · Cook dishes that have a lot of veggies in them, such as stir-fries and soups. Limit saturated fat  · Read food labels, and try to avoid saturated fats. They increase your risk of heart disease. · Use olive or canola oil when you cook. · Bake, broil, grill, or steam foods instead of frying them. · Choose lean meats instead of high-fat meats such as hot dogs and sausages. Cut off all visible fat when you prepare meat. · Eat fish, skinless poultry, and meat alternatives such as soy products instead of high-fat meats. Soy products, such as tofu, may be especially good for your heart. · Choose low-fat or fat-free milk and dairy products. Eat foods high in fiber  · Eat a variety of grain products every day. Include whole-grain foods that have lots of fiber and nutrients. Examples of whole-grain foods include oats, whole wheat bread, and brown rice. · Buy whole-grain breads and cereals, instead of white bread or pastries. Limit salt and sodium  · Limit how much salt and sodium you eat to help lower your blood pressure. · Taste food before you salt it. Add only a little salt when you think you need it. With time, your taste buds will adjust to less salt. · Eat fewer snack items, fast foods, and other high-salt, processed foods. Check food labels for the amount of sodium in packaged foods. · Choose low-sodium versions of canned goods (such as soups, vegetables, and beans).   Limit sugar  · Limit drinks and foods with added sugar. These include candy, desserts, and soda pop. Limit alcohol  · Limit alcohol to no more than 2 drinks a day for men and 1 drink a day for women. Too much alcohol can cause health problems. When should you call for help? Watch closely for changes in your health, and be sure to contact your doctor if:    · You would like help planning heart-healthy meals. Where can you learn more? Go to https://Zhaogangtoyeb[employer]. org and sign in to your [employer] account. Enter V137 in the [employer] box to learn more about \"Heart-Healthy Diet: Care Instructions. \"     If you do not have an account, please click on the \"Sign Up Now\" link. Current as of: December 17, 2020               Content Version: 13.0  © 2006-2021 Healthwise, Incorporated. Care instructions adapted under license by [facility] ([facility]). If you have questions about a medical condition or this instruction, always ask your healthcare professional. [place] [address] any warranty or liability for your use of this information.

## 2021-10-04 NOTE — PROGRESS NOTES
Chief Complaint   Patient presents with    Follow-Up from [address] [facility] Hypertension       Have you seen any other physician or provider since your last visit yes - ER visit 2 days ago. Have you had any other diagnostic tests since your last visit? yes -     Have you changed or stopped any medications since your last visit? yes - started on lisinopril     Patient was told to keep his blood pressure low since his visit to the ER. He also has lots of gas.

## 2021-10-04 NOTE — PROGRESS NOTES
SUBJECTIVE:    Patient ID: Jose Maria Stahl is a 46 y.o.male. Chief Complaint   Patient presents with    Follow-Up from Hospital    Hypertension         HPI:    Pt here for ED f/u. Seen [facility] ED 10/2. Started on lisinopril 10mg daily in ED on 10/2 for HTN.  in ED. No abdominal pain, CP, swelling, vomiting, SOB. Feeling better. Taking med as directed. Having some heartburn and does not take med for it. Some abdominal bloating, having regular BMs. Patient's medications, allergies, past medical, surgical, social and family histories were reviewed and updated as appropriate in electronic medical record. No outpatient medications have been marked as taking for the 10/4/21 encounter (Office Visit) with JEFFERY Lehman CNP. Review of Systems   Gastrointestinal:        Abdominal bloating, heartburn   All other systems reviewed and are negative. Past Medical History:   Diagnosis Date    Chronic back pain     GERD (gastroesophageal reflux disease)     Headache      History reviewed. No pertinent surgical history. Family History   Problem Relation Age of Onset    Cancer Mother         LUNG    Cancer Father         LUNG      Social History     Tobacco Use   Smoking Status Current Every Day Smoker    Packs/day: 0.50    Years: 40.00    Pack years: 20.00    Types: Cigarettes   Smokeless Tobacco Current User    Types: Chew       OBJECTIVE:   Wt Readings from Last 3 Encounters:   10/04/21 209 lb (94.8 kg)   10/02/21 215 lb (97.5 kg)   08/20/21 210 lb (95.3 kg)     BP Readings from Last 3 Encounters:   10/04/21 134/89   10/02/21 127/81   08/20/21 100/84       /89   Pulse 61   Temp 97.9 °F (36.6 °C)   Wt 209 lb (94.8 kg)   SpO2 98%   BMI 28.35 kg/m²        Physical Exam  Vitals and nursing note reviewed. Constitutional:       General: He is not in acute distress. Appearance: Normal appearance. HENT:      Head: Normocephalic.    Eyes:      Conjunctiva/sclera: Conjunctivae normal.   Cardiovascular:      Rate and Rhythm: Normal rate and regular rhythm. Pulmonary:      Effort: Pulmonary effort is normal.      Breath sounds: Normal breath sounds. Abdominal:      General: Bowel sounds are normal.      Palpations: Abdomen is soft. Tenderness: There is no abdominal tenderness. There is no guarding. Musculoskeletal:         General: Normal range of motion. Cervical back: Normal range of motion. Neurological:      General: No focal deficit present. Mental Status: He is alert and oriented to person, place, and time. Psychiatric:         Mood and Affect: Mood normal.         Thought Content: Thought content normal.         Lab Results   Component Value Date     05/07/2019    K 4.8 05/07/2019     05/07/2019    CO2 27 05/07/2019    GLUCOSE 109 05/07/2019    BUN 12 05/07/2019    CREATININE 1.0 05/07/2019    CALCIUM 9.9 05/07/2019    PROT 7.4 05/07/2019    LABALBU 4.7 05/07/2019    BILITOT 0.4 05/07/2019    ALT 15 05/07/2019    AST 13 05/07/2019       Hemoglobin A1C (%)   Date Value   10/02/2017 5.9     Microscopic Examination (no units)   Date Value   08/27/2016 YES     LDL Calculated (mg/dL)   Date Value   12/12/2016 162 (H)         Lab Results   Component Value Date    WBC 8.1 05/07/2019    NEUTROABS 4.2 05/07/2019    HGB 15.5 05/07/2019    HCT 46.7 05/07/2019    MCV 91.6 05/07/2019     05/07/2019       Lab Results   Component Value Date    TSH 3.96 12/12/2016         ASSESSMENT/PLAN:     1. Essential hypertension   today. Improved. Continue lisinopril as directed. Labs, will follow. Cardiac diet. Follow-up 1 to 2 months. Keep record at home.    - CBC; Future  - COMPREHENSIVE METABOLIC PANEL; Future  - LIPID PANEL; Future    2. Gas pain  - simethicone (MYLICON) 80 MG chewable tablet; Take 1 tablet by mouth every 6 hours as needed (gas pain, abdominal bloating)  Dispense: 60 tablet; Refill: 1    3.  Gastroesophageal reflux disease, unspecified whether esophagitis present  Start Protonix. Avoid aggravating factors. Tums as needed. Follow-up as needed. - pantoprazole (PROTONIX) 40 MG tablet; Take 1 tablet by mouth every morning (before breakfast)  Dispense: 90 tablet;  Refill: 1        Orders Placed This Encounter   Medications    simethicone (MYLICON) 80 MG chewable tablet     Sig: Take 1 tablet by mouth every 6 hours as needed (gas pain, abdominal bloating)     Dispense:  60 tablet     Refill:  1    pantoprazole (PROTONIX) 40 MG tablet     Sig: Take 1 tablet by mouth every morning (before breakfast)     Dispense:  90 tablet     Refill:  1

## 2021-10-09 ENCOUNTER — HOSPITAL ENCOUNTER (EMERGENCY)
Facility: HOSPITAL | Age: 51
Discharge: HOME | End: 2021-10-10
Attending: EMERGENCY MEDICINE
Payer: MEDICAID

## 2021-10-09 VITALS
DIASTOLIC BLOOD PRESSURE: 83 MMHG | OXYGEN SATURATION: 99 % | BODY MASS INDEX: 30.96 KG/M2 | SYSTOLIC BLOOD PRESSURE: 129 MMHG | RESPIRATION RATE: 20 BRPM | HEART RATE: 57 BPM | TEMPERATURE: 97.7 F | WEIGHT: 209 LBS | HEIGHT: 69 IN

## 2021-10-09 DIAGNOSIS — R07.9 CHEST PAIN, RULE OUT ACUTE MYOCARDIAL INFARCTION: ICD-10-CM

## 2021-10-09 DIAGNOSIS — M79.18 MUSCULOSKELETAL PAIN: ICD-10-CM

## 2021-10-09 DIAGNOSIS — R07.89 CHEST WALL PAIN: Primary | ICD-10-CM

## 2021-10-09 PROCEDURE — 99282 EMERGENCY DEPT VISIT SF MDM: CPT

## 2021-10-09 ASSESSMENT — PAIN DESCRIPTION - PAIN TYPE: TYPE: ACUTE PAIN

## 2021-10-09 ASSESSMENT — PAIN DESCRIPTION - LOCATION: LOCATION: CHEST

## 2021-10-09 ASSESSMENT — PAIN SCALES - GENERAL: PAINLEVEL_OUTOF10: 4

## 2021-10-10 ENCOUNTER — APPOINTMENT (OUTPATIENT)
Dept: GENERAL RADIOLOGY | Facility: HOSPITAL | Age: 51
End: 2021-10-10
Payer: MEDICAID

## 2021-10-10 LAB
A/G RATIO: 1.5 (ref 0.8–2)
ALBUMIN SERPL-MCNC: 4.7 G/DL (ref 3.4–4.8)
ALP BLD-CCNC: 39 U/L (ref 25–100)
ALT SERPL-CCNC: 15 U/L (ref 4–36)
ANION GAP SERPL CALCULATED.3IONS-SCNC: 7 MMOL/L (ref 3–16)
AST SERPL-CCNC: 13 U/L (ref 8–33)
BASOPHILS ABSOLUTE: 0.1 K/UL (ref 0–0.1)
BASOPHILS RELATIVE PERCENT: 1 %
BILIRUB SERPL-MCNC: 0.3 MG/DL (ref 0.3–1.2)
BUN BLDV-MCNC: 15 MG/DL (ref 6–20)
CALCIUM SERPL-MCNC: 9.8 MG/DL (ref 8.5–10.5)
CHLORIDE BLD-SCNC: 100 MMOL/L (ref 98–107)
CO2: 28 MMOL/L (ref 20–30)
CREAT SERPL-MCNC: 1.2 MG/DL (ref 0.4–1.2)
EOSINOPHILS ABSOLUTE: 0.6 K/UL (ref 0–0.4)
EOSINOPHILS RELATIVE PERCENT: 7.1 %
GFR AFRICAN AMERICAN: >59
GFR NON-AFRICAN AMERICAN: >59
GLOBULIN: 3.2 G/DL
GLUCOSE BLD-MCNC: 103 MG/DL (ref 74–106)
HCT VFR BLD CALC: 44.1 % (ref 40–54)
HEMOGLOBIN: 14.6 G/DL (ref 13–18)
IMMATURE GRANULOCYTES #: 0.1 K/UL
IMMATURE GRANULOCYTES %: 0.6 % (ref 0–5)
LIPASE: 43 U/L (ref 5.6–51.3)
LYMPHOCYTES ABSOLUTE: 3.3 K/UL (ref 1.5–4)
LYMPHOCYTES RELATIVE PERCENT: 40.8 %
MCH RBC QN AUTO: 29.9 PG (ref 27–32)
MCHC RBC AUTO-ENTMCNC: 33.1 G/DL (ref 31–35)
MCV RBC AUTO: 90.4 FL (ref 80–100)
MONOCYTES ABSOLUTE: 0.6 K/UL (ref 0.2–0.8)
MONOCYTES RELATIVE PERCENT: 7.6 %
NEUTROPHILS ABSOLUTE: 3.5 K/UL (ref 2–7.5)
NEUTROPHILS RELATIVE PERCENT: 42.9 %
PDW BLD-RTO: 12.6 % (ref 11–16)
PLATELET # BLD: 247 K/UL (ref 150–400)
PMV BLD AUTO: 11 FL (ref 6–10)
POTASSIUM SERPL-SCNC: 4.3 MMOL/L (ref 3.4–5.1)
PRO-BNP: 85 PG/ML (ref 0–1800)
RBC # BLD: 4.88 M/UL (ref 4.5–6)
SODIUM BLD-SCNC: 135 MMOL/L (ref 136–145)
TOTAL PROTEIN: 7.9 G/DL (ref 6.4–8.3)
TROPONIN: <0.3 NG/ML
TROPONIN: <0.3 NG/ML
WBC # BLD: 8.1 K/UL (ref 4–11)

## 2021-10-10 PROCEDURE — 83690 ASSAY OF LIPASE: CPT

## 2021-10-10 PROCEDURE — 36415 COLL VENOUS BLD VENIPUNCTURE: CPT

## 2021-10-10 PROCEDURE — 84484 ASSAY OF TROPONIN QUANT: CPT

## 2021-10-10 PROCEDURE — 85025 COMPLETE CBC W/AUTO DIFF WBC: CPT

## 2021-10-10 PROCEDURE — 83880 ASSAY OF NATRIURETIC PEPTIDE: CPT

## 2021-10-10 PROCEDURE — 80053 COMPREHEN METABOLIC PANEL: CPT

## 2021-10-10 PROCEDURE — 71045 X-RAY EXAM CHEST 1 VIEW: CPT

## 2021-10-10 PROCEDURE — 6370000000 HC RX 637 (ALT 250 FOR IP): Performed by: EMERGENCY MEDICINE

## 2021-10-10 RX ORDER — ASPIRIN 81 MG/1
TABLET, CHEWABLE ORAL
Status: DISCONTINUED
Start: 2021-10-10 | End: 2021-10-10 | Stop reason: HOSPADM

## 2021-10-10 RX ORDER — ASPIRIN 81 MG/1
324 TABLET, CHEWABLE ORAL DAILY
Status: DISCONTINUED | OUTPATIENT
Start: 2021-10-10 | End: 2021-10-10 | Stop reason: HOSPADM

## 2021-10-10 RX ADMIN — ASPIRIN 324 MG: 81 TABLET, CHEWABLE ORAL at 02:48

## 2021-10-10 NOTE — ED TRIAGE NOTES
Pt complaining of chest pressure that started 30 min PTA. Pt also complaining of numbness in right arm.

## 2021-10-10 NOTE — ED PROVIDER NOTES
[address] ENCOUNTER      Pt Name: Ralph Ugarte  MRN: [id number]  YOB: 1970  Date of evaluation: 10/9/2021  Provider: Gopi Linton MD    CHIEF COMPLAINT       Chief Complaint   Patient presents with    Chest Pain         HISTORY OF PRESENT ILLNESS  (Location/Symptom, Timing/Onset, Context/Setting, Quality, Duration, Modifying Factors, Severity.)   Ralph Ugarte is a 46 y.o. male who presents to the emergency department with what he describes approximately 1 hour history of chest wall pain that is worse with palpation and changes in position. Patient states it feels like a catching sensation which causes him to have some mild shortness of breath with it. Patient states that he was seen previously for similar complaint and was told that he had to be monitored for possible high blood pressure. Patient states that he did not follow-up with any cardiologist for outpatient stress test.  Patient has known past medical history of GERD and chronic back pain. Patient is resting comfortably in the room in no acute distress conversing in full sentences nontoxic      Nursing notes were reviewed. REVIEW OFSYSTEMS    (2-9 systems for level 4, 10 or more for level 5)   ROS:  General:  No fevers, no chills, no weakness  Cardiovascular: Reproducible chest wall pain  Respiratory:  No shortness of breath, no cough, no wheezing  Gastrointestinal:  No pain, no nausea, no vomiting, no diarrhea  Musculoskeletal:  No muscle pain, no joint pain  Skin:  No rash, no easy bruising  Neurologic:  No speech problems, no headache, no extremity weakness  Psychiatric:  No anxiety  Genitourinary:  No dysuria, no hematuria    Except as noted above the remainder of the review of systems was reviewed and negative.        PAST MEDICAL HISTORY     Past Medical History:   Diagnosis Date    Chronic back pain     GERD (gastroesophageal reflux disease)     Headache SURGICAL HISTORY     History reviewed. No pertinent surgical history. CURRENT MEDICATIONS       Previous Medications    LISINOPRIL (PRINIVIL;ZESTRIL) 20 MG TABLET    Take 0.5 tablets by mouth 2 times daily    PANTOPRAZOLE (PROTONIX) 40 MG TABLET    Take 1 tablet by mouth every morning (before breakfast)    SIMETHICONE (MYLICON) 80 MG CHEWABLE TABLET    Take 1 tablet by mouth every 6 hours as needed (gas pain, abdominal bloating)       ALLERGIES     Shrimp (diagnostic) and Penicillins    FAMILY HISTORY       Family History   Problem Relation Age of Onset    Cancer Mother         LUNG    Cancer Father         LUNG          SOCIAL HISTORY       Social History     Socioeconomic History    Marital status: Unknown     Spouse name: None    Number of children: None    Years of education: None    Highest education level: None   Occupational History    None   Tobacco Use    Smoking status: Current Every Day Smoker     Packs/day: 0.50     Years: 40.00     Pack years: 20.00     Types: Cigarettes    Smokeless tobacco: Current User     Types: Chew   Substance and Sexual Activity    Alcohol use: No    Drug use: No    Sexual activity: None   Other Topics Concern    None   Social History Narrative    None     Social Determinants of Health     Financial Resource Strain:     Difficulty of Paying Living Expenses:    Food Insecurity:     Worried About Running Out of Food in the Last Year:     Ran Out of Food in the Last Year:    Transportation Needs:     Lack of Transportation (Medical):      Lack of Transportation (Non-Medical):    Physical Activity:     Days of Exercise per Week:     Minutes of Exercise per Session:    Stress:     Feeling of Stress :    Social Connections:     Frequency of Communication with Friends and Family:     Frequency of Social Gatherings with Friends and Family:     Attends [employer] Services:     Active Member of Clubs or Organizations:     Attends Club or Organization Meetings:     Marital Status:    Intimate Partner Violence:     Fear of Current or Ex-Partner:     Emotionally Abused:     Physically Abused:     Sexually Abused:          PHYSICAL EXAM    (up to 7 for level 4, 8 or more for level 5)     ED Triage Vitals [10/09/21 2354]   BP Temp Temp Source Pulse Resp SpO2 Height Weight   129/83 97.7 °F (36.5 °C) Oral 57 20 99 % 5' 9\" (1.753 m) 209 lb (94.8 kg)       Physical Exam  General :Patient is awake, alert, oriented, in no acute distress, nontoxic appearing  HEENT: Pupils are equally round and reactive to light, EOMI, conjunctivae clear. Oral mucosa is moist, no exudate. Uvula is midline  Neck: Neck is supple, full range of motion, trachea midline  Cardiac: Heart regular rate, rhythm, no murmurs, rubs, or gallops  Lungs: Lungs are clear to auscultation, there is no wheezing, rhonchi, or rales. There is no use of accessory muscles. Chest wall: There is reproducible chest wall pain on palpation that recreates his symptoms have brought him into the emergency department. Abdomen: Abdomen is soft, nontender, nondistended. There is no firm or pulsatile masses, no rebound rigidity or guarding. Musculoskeletal: 5 out of 5 strength in all 4 extremities. No focal muscle deficits are appreciated  Neuro: Motor intact, sensory intact, level of consciousness is normal, cerebellar function is normal, reflexes are grossly normal. No evidence of incontinence or loss of bowel or bladder function, no saddle anesthesia noted   Dermatology: Skin is warm and dry  Psych: Mentation is grossly normal, cognition is grossly normal. Affect is appropriate.       DIAGNOSTIC RESULTS     EKG: All EKG's are interpreted by the Emergency Department Physician who either signs or Co-signs this chart in the [address] a cardiologist.    The EKG interpreted by me shows normal sinus rhythm rate of 60 per EDMD    RADIOLOGY:   Non-plain film images such as CT, Ultrasound and MRI are read by the brigette Vela radiographic images are visualized and preliminarily interpreted by the emergency physician with the below findings:      ? Radiologist's Report Reviewed:  XR CHEST PORTABLE   Final Result      No acute disease               ED BEDSIDE ULTRASOUND:   Performed by ED Physician - none    LABS:    I have reviewed and interpreted all of the currently available lab results from this visit (ifapplicable):  Results for orders placed or performed during the hospital encounter of 10/09/21   CBC Auto Differential   Result Value Ref Range    WBC 8.1 4.0 - 11.0 K/uL    RBC 4.88 4.50 - 6.00 M/uL    Hemoglobin 14.6 13.0 - 18.0 g/dL    Hematocrit 44.1 40.0 - 54.0 %    MCV 90.4 80.0 - 100.0 fL    MCH 29.9 27.0 - 32.0 pg    MCHC 33.1 31.0 - 35.0 g/dL    RDW 12.6 11.0 - 16.0 %    Platelets 256 941 - 941 K/uL    MPV 11.0 (H) 6.0 - 10.0 fL    Neutrophils % 42.9 %    Immature Granulocytes % 0.6 0.0 - 5.0 %    Lymphocytes % 40.8 %    Monocytes % 7.6 %    Eosinophils % 7.1 %    Basophils % 1.0 %    Neutrophils Absolute 3.5 2.0 - 7.5 K/uL    Immature Granulocytes # 0.1 K/uL    Lymphocytes Absolute 3.3 1.5 - 4.0 K/uL    Monocytes Absolute 0.6 0.2 - 0.8 K/uL    Eosinophils Absolute 0.6 (H) 0.0 - 0.4 K/uL    Basophils Absolute 0.1 0.0 - 0.1 K/uL   Comprehensive Metabolic Panel   Result Value Ref Range    Sodium 135 (L) 136 - 145 mmol/L    Potassium 4.3 3.4 - 5.1 mmol/L    Chloride 100 98 - 107 mmol/L    CO2 28 20 - 30 mmol/L    Anion Gap 7 3 - 16    Glucose 103 74 - 106 mg/dL    BUN 15 6 - 20 mg/dL    CREATININE 1.2 0.4 - 1.2 mg/dL    GFR Non-African American >59 >59    GFR African American >59 >59    Calcium 9.8 8.5 - 10.5 mg/dL    Total Protein 7.9 6.4 - 8.3 g/dL    Albumin 4.7 3.4 - 4.8 g/dL    Albumin/Globulin Ratio 1.5 0.8 - 2.0    Total Bilirubin 0.3 0.3 - 1.2 mg/dL    Alkaline Phosphatase 39 25 - 100 U/L    ALT 15 4 - 36 U/L    AST 13 8 - 33 U/L    Globulin 3.2 Not Established g/dL   Troponin   Result Value Ref Range Troponin <0.30 <0.30 ng/mL   Brain Natriuretic Peptide   Result Value Ref Range    Pro-BNP 85 0 - 1,800 pg/mL   Lipase   Result Value Ref Range    Lipase 43.0 5.6 - 51.3 U/L   Troponin   Result Value Ref Range    Troponin <0.30 <0.30 ng/mL        All other labs were within normal range or not returned as of this dictation. EMERGENCY DEPARTMENT COURSE and DIFFERENTIAL DIAGNOSIS/MDM:   Vitals:    Vitals:    10/09/21 2354   BP: 129/83   Pulse: 57   Resp: 20   Temp: 97.7 °F (36.5 °C)   TempSrc: Oral   SpO2: 99%   Weight: 209 lb (94.8 kg)   Height: 5' 9\" (1.753 m)       MEDICATIONS ADMINISTERED IN ED:  Medications   aspirin chewable tablet 324 mg (324 mg Oral Given 10/10/21 0248)       Patient was placed examination room at which time after exam was performed IV was obtained and labs are drawn. EKG revealed normal sinus rhythm rate of 60 per EDMD.  Patient was given 324 mg of aspirin p.o. review of patient's labs reveal normal CMP. Patient's glucose was 103. White count was 8.1 thousand and hemoglobin was 14.6 thousand. Patient was ruled out for acute MI with 3 hours observation on telemetry and to negative troponins 3 hours apart. Chest x-ray was reported by radiology as no acute process. Results reviewed with patient as instructed to follow-up with his primary physician for scheduling for outpatient stress test.  Patient was appreciative the care and agrees with the plan above. The patient will follow-up with their PCP in 1-2 days for reevaluation. If the patient or family members have anyfurther concerns or any worsening symptoms they will return to the ED for reevaluation. CONSULTS:  None    PROCEDURES:  Procedures    CRITICAL CARE TIME    Total Critical Care time was 0 minutes, excluding separately reportable procedures. There was a high probability of clinically significant/life threatening deterioration in the patient's condition which required my urgent intervention.       FINAL IMPRESSION      1. Chest wall pain Stable   2. Chest pain, rule out acute myocardial infarction Stable   3. Musculoskeletal pain Stable         DISPOSITION/PLAN   DISPOSITION        PATIENT REFERRED TO:  Houston Fish, APRN  [address] [facility] [address]  [phone]    Schedule an appointment as soon as possible for a visit in 2 days        Follow-up primary physician for scheduling for outpatient stress test  Schedule an appointment as soon as possible for a visit in 2 days        DISCHARGE MEDICATIONS:  New Prescriptions    No medications on file       Comment: Please note this report has been produced using speech recognition software and may contain errorsrelated to that system including errors in grammar, punctuation, and spelling, as well as words and phrases that may be inappropriate. If there are any questions or concerns please feel free to contact the dictating providerfor clarification.     Cate Johnson MD  Attending Emergency Physician              Cate Johnson MD  10/10/21 8141

## 2021-10-10 NOTE — ED NOTES
Went into pt's room to take out his IV,he advised that it fell out. He put a 2x2's on it with tape.       Myke Mabry, SHASHANK  10/10/21 2331

## 2021-11-16 ENCOUNTER — OFFICE VISIT (OUTPATIENT)
Dept: PRIMARY CARE CLINIC | Age: 51
End: 2021-11-16
Payer: MEDICAID

## 2021-11-16 ENCOUNTER — HOSPITAL ENCOUNTER (OUTPATIENT)
Facility: HOSPITAL | Age: 51
Discharge: HOME | End: 2021-11-16
Payer: MEDICAID

## 2021-11-16 VITALS
HEIGHT: 69 IN | OXYGEN SATURATION: 100 % | WEIGHT: 212 LBS | SYSTOLIC BLOOD PRESSURE: 102 MMHG | TEMPERATURE: 97.8 F | HEART RATE: 80 BPM | BODY MASS INDEX: 31.4 KG/M2 | RESPIRATION RATE: 16 BRPM | DIASTOLIC BLOOD PRESSURE: 66 MMHG

## 2021-11-16 DIAGNOSIS — M54.50 CHRONIC BILATERAL LOW BACK PAIN WITHOUT SCIATICA: ICD-10-CM

## 2021-11-16 DIAGNOSIS — I10 PRIMARY HYPERTENSION: ICD-10-CM

## 2021-11-16 DIAGNOSIS — Z13.29 THYROID DISORDER SCREEN: ICD-10-CM

## 2021-11-16 DIAGNOSIS — R73.9 HYPERGLYCEMIA: ICD-10-CM

## 2021-11-16 DIAGNOSIS — E55.9 VITAMIN D DEFICIENCY: ICD-10-CM

## 2021-11-16 DIAGNOSIS — K21.9 GASTROESOPHAGEAL REFLUX DISEASE, UNSPECIFIED WHETHER ESOPHAGITIS PRESENT: ICD-10-CM

## 2021-11-16 DIAGNOSIS — I10 PRIMARY HYPERTENSION: Primary | ICD-10-CM

## 2021-11-16 DIAGNOSIS — I10 ESSENTIAL HYPERTENSION: ICD-10-CM

## 2021-11-16 DIAGNOSIS — G89.29 CHRONIC BILATERAL LOW BACK PAIN WITHOUT SCIATICA: ICD-10-CM

## 2021-11-16 LAB
A/G RATIO: 1.8 (ref 0.8–2)
ALBUMIN SERPL-MCNC: 4.8 G/DL (ref 3.4–4.8)
ALP BLD-CCNC: 41 U/L (ref 25–100)
ALT SERPL-CCNC: 22 U/L (ref 4–36)
ANION GAP SERPL CALCULATED.3IONS-SCNC: 10 MMOL/L (ref 3–16)
AST SERPL-CCNC: 16 U/L (ref 8–33)
BILIRUB SERPL-MCNC: 0.5 MG/DL (ref 0.3–1.2)
BUN BLDV-MCNC: 13 MG/DL (ref 6–20)
CALCIUM SERPL-MCNC: 10.4 MG/DL (ref 8.5–10.5)
CHLORIDE BLD-SCNC: 101 MMOL/L (ref 98–107)
CHOLESTEROL, TOTAL: 229 MG/DL (ref 0–200)
CO2: 28 MMOL/L (ref 20–30)
CREAT SERPL-MCNC: 1 MG/DL (ref 0.4–1.2)
FOLATE: 9.62 NG/ML
GFR AFRICAN AMERICAN: >59
GFR NON-AFRICAN AMERICAN: >59
GLOBULIN: 2.7 G/DL
GLUCOSE BLD-MCNC: 101 MG/DL (ref 74–106)
HBA1C MFR BLD: 5.4 %
HCT VFR BLD CALC: 45.5 % (ref 40–54)
HDLC SERPL-MCNC: 37 MG/DL (ref 40–60)
HEMOGLOBIN: 15 G/DL (ref 13–18)
LDL CHOLESTEROL CALCULATED: 120 MG/DL
MCH RBC QN AUTO: 30.1 PG (ref 27–32)
MCHC RBC AUTO-ENTMCNC: 33 G/DL (ref 31–35)
MCV RBC AUTO: 91.2 FL (ref 80–100)
PDW BLD-RTO: 13.1 % (ref 11–16)
PLATELET # BLD: 286 K/UL (ref 150–400)
PMV BLD AUTO: 11.1 FL (ref 6–10)
POTASSIUM SERPL-SCNC: 5.1 MMOL/L (ref 3.4–5.1)
RBC # BLD: 4.99 M/UL (ref 4.5–6)
SODIUM BLD-SCNC: 139 MMOL/L (ref 136–145)
TOTAL PROTEIN: 7.5 G/DL (ref 6.4–8.3)
TRIGL SERPL-MCNC: 360 MG/DL (ref 0–249)
TSH SERPL DL<=0.05 MIU/L-ACNC: 2.9 UIU/ML (ref 0.27–4.2)
VITAMIN B-12: 694 PG/ML (ref 211–911)
VITAMIN D 25-HYDROXY: 38.6 (ref 32–100)
VLDLC SERPL CALC-MCNC: 72 MG/DL
WBC # BLD: 7.7 K/UL (ref 4–11)

## 2021-11-16 PROCEDURE — 80061 LIPID PANEL: CPT

## 2021-11-16 PROCEDURE — 80053 COMPREHEN METABOLIC PANEL: CPT

## 2021-11-16 PROCEDURE — 82607 VITAMIN B-12: CPT

## 2021-11-16 PROCEDURE — G8419 CALC BMI OUT NRM PARAM NOF/U: HCPCS | Performed by: NURSE PRACTITIONER

## 2021-11-16 PROCEDURE — G8484 FLU IMMUNIZE NO ADMIN: HCPCS | Performed by: NURSE PRACTITIONER

## 2021-11-16 PROCEDURE — 85027 COMPLETE CBC AUTOMATED: CPT

## 2021-11-16 PROCEDURE — G8427 DOCREV CUR MEDS BY ELIG CLIN: HCPCS | Performed by: NURSE PRACTITIONER

## 2021-11-16 PROCEDURE — 82746 ASSAY OF FOLIC ACID SERUM: CPT

## 2021-11-16 PROCEDURE — 4004F PT TOBACCO SCREEN RCVD TLK: CPT | Performed by: NURSE PRACTITIONER

## 2021-11-16 PROCEDURE — 99213 OFFICE O/P EST LOW 20 MIN: CPT | Performed by: NURSE PRACTITIONER

## 2021-11-16 PROCEDURE — 3017F COLORECTAL CA SCREEN DOC REV: CPT | Performed by: NURSE PRACTITIONER

## 2021-11-16 PROCEDURE — 84443 ASSAY THYROID STIM HORMONE: CPT

## 2021-11-16 PROCEDURE — 82306 VITAMIN D 25 HYDROXY: CPT

## 2021-11-16 PROCEDURE — 83036 HEMOGLOBIN GLYCOSYLATED A1C: CPT | Performed by: NURSE PRACTITIONER

## 2021-11-16 RX ORDER — LISINOPRIL 10 MG/1
10 TABLET ORAL DAILY
Qty: 30 TABLET | Refills: 3 | Status: SHIPPED | OUTPATIENT
Start: 2021-11-16 | End: 2022-01-19 | Stop reason: DRUGHIGH

## 2021-11-16 SDOH — ECONOMIC STABILITY: FOOD INSECURITY: WITHIN THE PAST 12 MONTHS, THE FOOD YOU BOUGHT JUST DIDN'T LAST AND YOU DIDN'T HAVE MONEY TO GET MORE.: NEVER TRUE

## 2021-11-16 SDOH — ECONOMIC STABILITY: FOOD INSECURITY: WITHIN THE PAST 12 MONTHS, YOU WORRIED THAT YOUR FOOD WOULD RUN OUT BEFORE YOU GOT MONEY TO BUY MORE.: NEVER TRUE

## 2021-11-16 ASSESSMENT — ENCOUNTER SYMPTOMS
RESPIRATORY NEGATIVE: 1
GASTROINTESTINAL NEGATIVE: 1
EYES NEGATIVE: 1
ALLERGIC/IMMUNOLOGIC NEGATIVE: 1

## 2021-11-16 ASSESSMENT — PATIENT HEALTH QUESTIONNAIRE - PHQ9
SUM OF ALL RESPONSES TO PHQ QUESTIONS 1-9: 0
1. LITTLE INTEREST OR PLEASURE IN DOING THINGS: 0
SUM OF ALL RESPONSES TO PHQ QUESTIONS 1-9: 0
2. FEELING DOWN, DEPRESSED OR HOPELESS: 0
SUM OF ALL RESPONSES TO PHQ9 QUESTIONS 1 & 2: 0
SUM OF ALL RESPONSES TO PHQ QUESTIONS 1-9: 0

## 2021-11-16 ASSESSMENT — SOCIAL DETERMINANTS OF HEALTH (SDOH): HOW HARD IS IT FOR YOU TO PAY FOR THE VERY BASICS LIKE FOOD, HOUSING, MEDICAL CARE, AND HEATING?: NOT HARD AT ALL

## 2021-11-16 NOTE — PROGRESS NOTES
Chief Complaint   Patient presents with    Hypertension    Abdominal Pain     aorta    Discuss Labs       Have you seen any other physician or provider since your last visit no    Have you had any other diagnostic tests since your last visit? yes - labs    Have you changed or stopped any medications since your last visit? no     I have recommended that this patient have a immunization for pneumonia and sigmoidoscopy but he declines at this time. I have discussed the risks and benefits of this examination with him. The patient verbalizes understanding. SUBJECTIVE:    Patient ID:Burak Diamond is a 46 y.o. male. Chief Complaint   Patient presents with    Hypertension    Abdominal Pain     aorta    Discuss Labs     HPI:  Patient has had hypertension for few years. He has been compliant with taking medications, without side effects from it. He has been following a low-sodium, is active and daily exercises. Weight is fluctuating a bit, compared to last visit. His blood pressure is stable 102/66 at this time. He did quit smoking 2 years ago. Both his parents  of lung cancer. He denies any chest pain after starting the blood pressure medication and stopping smoking. Patient was diagnosed with GERD. He has been on medication with good response. Patient has been compliant with diet. Patient states  He has an abdominal aorta problem. He is on disability. He only went to 9 th grade in school. Patient's medications, allergies, past medical, surgical, social and family histories were reviewed and updated as appropriate. Review of Systems   Constitutional: Negative. HENT: Negative. Eyes: Negative. Respiratory: Negative. Cardiovascular: Negative. Gastrointestinal: Negative. Endocrine: Negative. Genitourinary: Negative. Musculoskeletal: Negative. Skin: Negative. Allergic/Immunologic: Negative. Neurological: Negative. Hematological: Negative. Psychiatric/Behavioral: Negative. OBJECTIVE:  /66 (Site: Right Upper Arm, Position: Sitting, Cuff Size: Medium Adult)   Pulse 80   Temp 97.8 °F (36.6 °C) (Temporal)   Resp 16   Ht 5' 9\" (1.753 m)   Wt 212 lb (96.2 kg)   SpO2 100% Comment: room air  BMI 31.31 kg/m²    Physical Exam  Vitals and nursing note reviewed. Constitutional:       Appearance: He is well-developed. HENT:      Head: Normocephalic and atraumatic. Eyes:      Conjunctiva/sclera: Conjunctivae normal.      Pupils: Pupils are equal, round, and reactive to light. Neck:      Thyroid: No thyromegaly. Vascular: No JVD. Cardiovascular:      Rate and Rhythm: Normal rate and regular rhythm. Heart sounds: No murmur heard. No friction rub. No gallop. Pulmonary:      Effort: Pulmonary effort is normal. No respiratory distress. Breath sounds: Normal breath sounds. No wheezing or rales. Abdominal:      General: Bowel sounds are normal. There is no distension. Palpations: Abdomen is soft. Tenderness: There is no abdominal tenderness. There is no guarding. Musculoskeletal:         General: No tenderness. Cervical back: Normal range of motion and neck supple. Skin:     General: Skin is warm and dry. Findings: No rash. Neurological:      Mental Status: He is alert and oriented to person, place, and time.    Psychiatric:         Judgment: Judgment normal.         Results in Past 30 Days  Result Component Current Result Ref Range Previous Result Ref Range   Albumin/Globulin Ratio 1.6 (11/23/2021) 0.8 - 2.0 1.8 (11/16/2021) 0.8 - 2.0   Albumin 4.4 (11/23/2021) 3.4 - 4.8 g/dL 4.8 (11/16/2021) 3.4 - 4.8 g/dL   Alkaline Phosphatase 41 (11/23/2021) 25 - 100 U/L 41 (11/16/2021) 25 - 100 U/L   ALT <5 (11/23/2021) 4 - 36 U/L 22 (11/16/2021) 4 - 36 U/L   AST <5 (A) (11/23/2021) 8 - 33 U/L 16 (11/16/2021) 8 - 33 U/L   BUN 13 (11/23/2021) 6 - 20 mg/dL 13 (11/16/2021) 6 - 20 mg/dL   Calcium 9.7 (11/23/2021) 8.5 - 10.5 mg/dL 10.4 (11/16/2021) 8.5 - 10.5 mg/dL   Chloride 99 (11/23/2021) 98 - 107 mmol/L 101 (11/16/2021) 98 - 107 mmol/L   CO2 26 (11/23/2021) 20 - 30 mmol/L 28 (11/16/2021) 20 - 30 mmol/L   CREATININE 1.0 (11/23/2021) 0.4 - 1.2 mg/dL 1.0 (11/16/2021) 0.4 - 1.2 mg/dL   GFR  >59 (11/23/2021) >59 >59 (11/16/2021) >59   GFR Non- >59 (11/23/2021) >59 >59 (11/16/2021) >59   Globulin 2.8 (11/23/2021) Not Established g/dL 2.7 (11/16/2021) Not Established g/dL   Glucose 101 (11/23/2021) 74 - 106 mg/dL 101 (11/16/2021) 74 - 106 mg/dL   Potassium reflex Magnesium 4.5 (11/23/2021) 3.4 - 5.1 mmol/L 5.1 (11/16/2021) 3.4 - 5.1 mmol/L   Sodium 134 (L) (11/23/2021) 136 - 145 mmol/L 139 (11/16/2021) 136 - 145 mmol/L   Total Bilirubin 0.3 (11/23/2021) 0.3 - 1.2 mg/dL 0.5 (11/16/2021) 0.3 - 1.2 mg/dL   Total Protein 7.2 (11/23/2021) 6.4 - 8.3 g/dL 7.5 (11/16/2021) 6.4 - 8.3 g/dL       Hemoglobin A1C (%)   Date Value   11/16/2021 5.4     Microscopic Examination (no units)   Date Value   11/23/2021 YES     LDL Calculated (mg/dL)   Date Value   11/16/2021 120         Lab Results   Component Value Date    WBC 10.0 11/23/2021    NEUTROABS 5.5 11/23/2021    HGB 13.6 11/23/2021    HCT 38.9 11/23/2021    MCV 89.0 11/23/2021     11/23/2021       Lab Results   Component Value Date    TSH 2.90 11/16/2021         ASSESSMENT/PLAN:     1. Primary hypertension  Doing well on current blood pressure medicine.  - lisinopril (PRINIVIL;ZESTRIL) 10 MG tablet; Take 1 tablet by mouth daily  Dispense: 30 tablet; Refill: 3  - CBC; Future    2. Gastroesophageal reflux disease, unspecified whether esophagitis present  Continue Bentyl and Protonix. 3. Chronic bilateral low back pain without sciatica    4. Hyperglycemia    - POCT glycosylated hemoglobin (Hb A1C)    5. Vitamin D deficiency    - Vitamin B12 & Folate; Future  - Vitamin D 25 Hydroxy; Future    6.  Thyroid disorder screen  - TSH without Reflex;  Future       Written by Maritza NICOLE, acting as a scribe for Melony Brown on 11/16/2021 at 11:34 PM.

## 2021-11-16 NOTE — PATIENT INSTRUCTIONS
· Keep a list of your medicines with you. List all of the prescription medicines, nonprescription medicines, supplements, natural remedies, and vitamins that you take. Tell your healthcare providers who treat you about all of the products you are taking. Your provider can provide you with a form to keep track of them. Just ask. · Follow the directions that come with your medicine, including information about food or alcohol. Make sure you know how and when to take your medicine. Do not take more or less than you are supposed to take. · Keep all medicines out of the reach of children. · Store medicines according to the directions on the label. · Monitor yourself. Learn to know how your body reacts to your new medicine and keep track of how it makes you feel before attempting (If your provider has allowed you to do so) to drive or go to work. · Seek emergency medical attention if you think you have used too much of this medicine. An overdose of any prescription medicine can be fatal. Overdose symptoms may include extreme drowsiness, muscle weakness, confusion, cold and clammy skin, pinpoint pupils, shallow breathing, slow heart rate, fainting, or coma. · Don't share prescription medicines with others, even when they seem to have the same symptoms. What may be good for you may be harmful to others. · If you are no longer taking a prescribed medication and you have pills left please take your pills out of their original containers. Mix crushed pills with an undesirable substance, such as cat litter or used coffee grounds. Put the mixture into a disposable container with a lid, such as an empty margarine tub, or into a sealable bag. Cover up or remove any of your personal information on the empty containers by covering it with black permanent marker or duct tape. Place the sealed container with the mixture, and the empty drug containers, in the trash.    · If you use a medication that is in the form of a patch, dispose of used patches by folding them in half so that the sticky sides meet, and then flushing them down a toilet. They should not be placed in the household trash where children or pets can find them. · If you have any questions, ask your provider or pharmacist for more information. · Be sure to keep all appointments for provider visits or tests. We are committed to providing you with the best care possible. In order to help us achieve these goals please remember to bring all medications, herbal products, and over the counter supplements with you to each visit. If your provider has ordered testing for you, please be sure to follow up with our office if you have not received results within 7 days after the testing took place. *If you receive a survey after visiting one of our offices, please take time to share your experience concerning your physician office visit. These surveys are confidential and no health information about you is shared. We are eager to improve for you and we are counting on your feedback to help make that happen. ips to Help You Stop Smoking       Cigarette smoking is a preventable cause of death in the [place]. If you have thought about quitting but haven't been able to, here are some reasons why you should and some ways to do it. Here's Why   Quitting smoking now can decrease your risk of getting smoking-related illnesses like:   Heart disease   Stroke   Several types of cancer, including:   Lung   Mouth   Esophagus   Larynx   Bladder   Pancreas   Kidney   Chronic lung diseases:   Bronchitis   Emphysema   Asthma   Cataracts   Macular degeneration   Thyroid conditions   Hearing loss   Erectile dysfunction   Dementia   Osteoporosis   Here's How   Once you've decided to quit smoking, set your target quit date a few weeks away.  In the time leading up to your quit day, try some of these ideas offered by the [address] [place] [address] [place] to help you successfully quit smoking. For the best results, work with your doctor. Together, you can test your lung function and compare the results to those of a nonsmoking person. The results can be given to you as your lung age. Finding out your lung age right after having the test done may help you to stop smoking. Your doctor can also discuss with you all of your options and refer you to smoking-cessation support groups. You may wish to use nicotine replacement (gum, patches, inhaler) or one of the prescription medications that have been shown to increase quit rates and prolong abstinence from smoking. But whatever you and your doctor decide on these matters, it will still be you who decides when an how to quit. Here are some techniques:   Switch Brands   Switch to a brand you find distasteful. Change to a brand that is low in tar and nicotine a couple of weeks before your target quit date. This will help change your smoking behavior. However, do not smoke more cigarettes, inhale them more often or more deeply, or place your fingertips over the holes in the filters. All of these actions will increase your nicotine intake, and the idea is to get your body used to functioning without nicotine. Cut Down the Number of Cigarettes You Smoke   Smoke only half of each cigarette. Each day, postpone the lighting of your first cigarette by one hour. Decide you'll only smoke during odd or even hours of the day. Decide beforehand how many cigarettes you'll smoke during the day. For each additional cigarette, give a dollar to your favorite marielos. Change your eating habits to help you cut down. For example, drink milk, which many people consider incompatible with smoking. End meals or snacks with something that won't lead to a cigarette. Reach for a glass of juice instead of a cigarette for a \"pick-me-up. \"   Remember: Cutting down can help you quit, but it's not a substitute for quitting.  If you're down to about seven cigarettes a day, it's time to set your target quit date, and get ready to stick to it. Don't Smoke \"Automatically\"   Smoke only those cigarettes you really want. Catch yourself before you light up a cigarette out of pure habit. Don't empty your ashtrays. This will remind you of how many cigarettes you've smoked each day, and the sight and the smell of stale cigarettes butts will be very unpleasant. Make yourself aware of each cigarette by using the opposite hand or putting cigarettes in an unfamiliar location or a different pocket to break the automatic reach. If you light up many times during the day without even thinking about it, try to look in a mirror each time you put a match to your cigarette. You may decide you don't need it. Make Smoking Inconvenient   Stop buying cigarettes by the carton. Wait until one pack is empty before you buy another. Stop carrying cigarettes with you at home or at work. Make them difficult to get to. Make Smoking Unpleasant   Smoke only under circumstances that aren't especially pleasurable for you. If you like to smoke with others, smoke alone. Turn your chair to an empty corner and focus only on the cigarette you are smoking and all its many negative effects. Collect all your cigarette butts in one large glass container as a visual reminder of the filth made by smoking. Just Before Quitting   Practice going without cigarettes. Don't think of never smoking again. Think of quitting in terms of one day at a time . Tell yourself you won't smoke today, and then don't. Clean your clothes to rid them of the cigarette smell, which can linger a long time. On the Day You Quit   Throw away all your cigarettes and matches. Hide your lighters and ashtrays. Visit the dentist and have your teeth cleaned to get rid of tobacco stains. Notice how nice they look and resolve to keep them that way.    Make a list of things you'd like to buy for yourself or someone else. Estimate the cost in terms of packs of cigarettes, and put the money aside to buy these presents. Keep very busy on the big day. Go to the movies, exercise, take long walks, or go bike riding. Remind your family and friends that this is your quit date, and ask them to help you over the rough spots of the first couple of days and weeks. Buy yourself a treat or do something special to celebrate. Telephone and Internet Support   Telephone, web-, and computer-based programs can offer you the support that you need to quit and to stay smoke-free. You can find many programs online, like the American Lung Association's [place] from Smoking . Immediately After Quitting   Develop a clean, fresh, nonsmoking environment around yourselfat work and at home. Buy yourself flowersyou may be surprised how much you can enjoy their scent now. The first few days after you quit, spend as much free time as possible in places where smoking isn't allowed, such as [address] [place] [address], museums, theaters, department stores, and churches. Drink large quantities of water and fruit juice (but avoid sodas that contain caffeine). Try to avoid alcohol, coffee, and other beverages that you associate with cigarette smoking. Strike up conversation instead of a match for a cigarette. If you miss the sensation of having a cigarette in your hand, play with something elsea pencil, a paper clip, a marble. If you miss having something in your mouth, try toothpicks or a fake cigarette.

## 2021-11-23 ENCOUNTER — HOSPITAL ENCOUNTER (EMERGENCY)
Facility: HOSPITAL | Age: 51
Discharge: HOME | End: 2021-11-23
Attending: EMERGENCY MEDICINE
Payer: MEDICAID

## 2021-11-23 VITALS
HEIGHT: 68 IN | OXYGEN SATURATION: 100 % | RESPIRATION RATE: 20 BRPM | WEIGHT: 212 LBS | TEMPERATURE: 97.7 F | HEART RATE: 80 BPM | DIASTOLIC BLOOD PRESSURE: 83 MMHG | SYSTOLIC BLOOD PRESSURE: 136 MMHG | BODY MASS INDEX: 32.13 KG/M2

## 2021-11-23 DIAGNOSIS — R10.31 ABDOMINAL PAIN, RIGHT LOWER QUADRANT: Primary | ICD-10-CM

## 2021-11-23 LAB
A/G RATIO: 1.6 (ref 0.8–2)
ALBUMIN SERPL-MCNC: 4.4 G/DL (ref 3.4–4.8)
ALP BLD-CCNC: 41 U/L (ref 25–100)
ALT SERPL-CCNC: <5 U/L (ref 4–36)
ANION GAP SERPL CALCULATED.3IONS-SCNC: 9 MMOL/L (ref 3–16)
AST SERPL-CCNC: <5 U/L (ref 8–33)
BASOPHILS ABSOLUTE: 0.1 K/UL (ref 0–0.1)
BASOPHILS RELATIVE PERCENT: 0.5 %
BILIRUB SERPL-MCNC: 0.3 MG/DL (ref 0.3–1.2)
BILIRUBIN URINE: NEGATIVE
BLOOD, URINE: ABNORMAL
BUN BLDV-MCNC: 13 MG/DL (ref 6–20)
CALCIUM SERPL-MCNC: 9.7 MG/DL (ref 8.5–10.5)
CHLORIDE BLD-SCNC: 99 MMOL/L (ref 98–107)
CLARITY: CLEAR
CO2: 26 MMOL/L (ref 20–30)
COLOR: YELLOW
CREAT SERPL-MCNC: 1 MG/DL (ref 0.4–1.2)
EOSINOPHILS ABSOLUTE: 0.4 K/UL (ref 0–0.4)
EOSINOPHILS RELATIVE PERCENT: 3.9 %
GFR AFRICAN AMERICAN: >59
GFR NON-AFRICAN AMERICAN: >59
GLOBULIN: 2.8 G/DL
GLUCOSE BLD-MCNC: 101 MG/DL (ref 74–106)
GLUCOSE URINE: NEGATIVE MG/DL
HCT VFR BLD CALC: 38.9 % (ref 40–54)
HEMOGLOBIN: 13.6 G/DL (ref 13–18)
IMMATURE GRANULOCYTES #: 0 K/UL
IMMATURE GRANULOCYTES %: 0.3 % (ref 0–5)
KETONES, URINE: NEGATIVE MG/DL
LEUKOCYTE ESTERASE, URINE: NEGATIVE
LIPASE: 31 U/L (ref 5.6–51.3)
LYMPHOCYTES ABSOLUTE: 3.3 K/UL (ref 1.5–4)
LYMPHOCYTES RELATIVE PERCENT: 33.2 %
MCH RBC QN AUTO: 31.1 PG (ref 27–32)
MCHC RBC AUTO-ENTMCNC: 35 G/DL (ref 31–35)
MCV RBC AUTO: 89 FL (ref 80–100)
MICROSCOPIC EXAMINATION: YES
MONOCYTES ABSOLUTE: 0.7 K/UL (ref 0.2–0.8)
MONOCYTES RELATIVE PERCENT: 7 %
MUCUS: ABNORMAL /LPF
NEUTROPHILS ABSOLUTE: 5.5 K/UL (ref 2–7.5)
NEUTROPHILS RELATIVE PERCENT: 55.1 %
NITRITE, URINE: NEGATIVE
PDW BLD-RTO: 12.8 % (ref 11–16)
PH UA: 5.5 (ref 5–8)
PLATELET # BLD: 271 K/UL (ref 150–400)
PMV BLD AUTO: 10.3 FL (ref 6–10)
POTASSIUM REFLEX MAGNESIUM: 4.5 MMOL/L (ref 3.4–5.1)
PROTEIN UA: NEGATIVE MG/DL
RBC # BLD: 4.37 M/UL (ref 4.5–6)
RBC UA: ABNORMAL /HPF (ref 0–4)
SODIUM BLD-SCNC: 134 MMOL/L (ref 136–145)
SPECIFIC GRAVITY UA: 1.02 (ref 1–1.03)
TOTAL PROTEIN: 7.2 G/DL (ref 6.4–8.3)
URINE REFLEX TO CULTURE: ABNORMAL
URINE TYPE: ABNORMAL
UROBILINOGEN, URINE: 0.2 E.U./DL
WBC # BLD: 10 K/UL (ref 4–11)
WBC UA: ABNORMAL /HPF (ref 0–5)

## 2021-11-23 PROCEDURE — 99283 EMERGENCY DEPT VISIT LOW MDM: CPT

## 2021-11-23 PROCEDURE — 96374 THER/PROPH/DIAG INJ IV PUSH: CPT

## 2021-11-23 PROCEDURE — 36415 COLL VENOUS BLD VENIPUNCTURE: CPT

## 2021-11-23 PROCEDURE — 85025 COMPLETE CBC W/AUTO DIFF WBC: CPT

## 2021-11-23 PROCEDURE — 80053 COMPREHEN METABOLIC PANEL: CPT

## 2021-11-23 PROCEDURE — 83690 ASSAY OF LIPASE: CPT

## 2021-11-23 PROCEDURE — 81001 URINALYSIS AUTO W/SCOPE: CPT

## 2021-11-23 PROCEDURE — 2580000003 HC RX 258: Performed by: EMERGENCY MEDICINE

## 2021-11-23 PROCEDURE — 6360000002 HC RX W HCPCS: Performed by: EMERGENCY MEDICINE

## 2021-11-23 RX ORDER — SODIUM CHLORIDE, SODIUM LACTATE, POTASSIUM CHLORIDE, AND CALCIUM CHLORIDE .6; .31; .03; .02 G/100ML; G/100ML; G/100ML; G/100ML
1000 INJECTION, SOLUTION INTRAVENOUS ONCE
Status: COMPLETED | OUTPATIENT
Start: 2021-11-23 | End: 2021-11-23

## 2021-11-23 RX ORDER — DICYCLOMINE HYDROCHLORIDE 10 MG/1
10 CAPSULE ORAL EVERY 6 HOURS PRN
Qty: 20 CAPSULE | Refills: 0 | Status: SHIPPED | OUTPATIENT
Start: 2021-11-23 | End: 2022-01-19 | Stop reason: ALTCHOICE

## 2021-11-23 RX ORDER — KETOROLAC TROMETHAMINE 30 MG/ML
15 INJECTION, SOLUTION INTRAMUSCULAR; INTRAVENOUS ONCE
Status: COMPLETED | OUTPATIENT
Start: 2021-11-23 | End: 2021-11-23

## 2021-11-23 RX ADMIN — KETOROLAC TROMETHAMINE 15 MG: 30 INJECTION, SOLUTION INTRAMUSCULAR at 15:37

## 2021-11-23 RX ADMIN — SODIUM CHLORIDE, POTASSIUM CHLORIDE, SODIUM LACTATE AND CALCIUM CHLORIDE 1000 ML: 600; 310; 30; 20 INJECTION, SOLUTION INTRAVENOUS at 15:36

## 2021-11-23 ASSESSMENT — PAIN DESCRIPTION - PAIN TYPE: TYPE: ACUTE PAIN

## 2021-11-23 ASSESSMENT — PAIN SCALES - GENERAL
PAINLEVEL_OUTOF10: 6
PAINLEVEL_OUTOF10: 6
PAINLEVEL_OUTOF10: 0

## 2021-11-23 ASSESSMENT — PAIN DESCRIPTION - ONSET: ONSET: GRADUAL

## 2021-11-23 ASSESSMENT — PAIN DESCRIPTION - LOCATION: LOCATION: ABDOMEN

## 2021-11-23 ASSESSMENT — PAIN DESCRIPTION - DESCRIPTORS: DESCRIPTORS: TIGHTNESS

## 2021-11-23 ASSESSMENT — PAIN DESCRIPTION - ORIENTATION: ORIENTATION: RIGHT

## 2021-11-23 ASSESSMENT — PAIN DESCRIPTION - FREQUENCY: FREQUENCY: CONTINUOUS

## 2021-11-23 NOTE — ED PROVIDER NOTES
[facility] ENCOUNTER      Pt Name: Jacinta Cárdenas  MRN: [id number]  YOB: 1970  Date of evaluation: 11/23/2021  Provider: Ca Garner MD    [address] [place] [address]       Chief Complaint   Patient presents with    Abdominal Pain     RLQ pain 6/10 pain started 7 days ago gradually getting worse every day. HISTORY OF PRESENT ILLNESS  (Location/Symptom, Timing/Onset, Context/Setting, Quality, Duration, Modifying Factors, Severity.)   Jacinta Cárdenas is a 46 y.o. male who presents to the emergency department for right lower quadrant abdominal pain for the last 7 days he states is been getting worse today describes it as a sharp nonradiating pain currently rated a 6 out of 10 which is the worst it has been he denies nausea vomiting or diarrhea he had a normal bowel movement about 2 hours ago denies dysuria or hematuria. Nursing notes were reviewed. REVIEW OFSYSTEMS    (2-9 systems for level 4, 10 or more for level 5)   ROS:  General:  No fevers, no chills, no weakness  Cardiovascular:  No chest pain, no palpitations  Respiratory:  No shortness of breath, no cough, no wheezing  Gastrointestinal:  + pain, no nausea, no vomiting, no diarrhea  Musculoskeletal:  No muscle pain, no joint pain  Skin:  No rash, no easy bruising  Neurologic:  No speech problems, no headache, no extremity weakness  Psychiatric:  No anxiety  Genitourinary:  No dysuria, no hematuria    Except as noted above the remainder of the review of systems was reviewed and negative. PAST MEDICAL HISTORY     Past Medical History:   Diagnosis Date    Chronic back pain     GERD (gastroesophageal reflux disease)     Headache     Hypertension          SURGICAL HISTORY     No past surgical history on file.       CURRENT MEDICATIONS       Previous Medications    LISINOPRIL (PRINIVIL;ZESTRIL) 10 MG TABLET    Take 1 tablet by mouth daily    LISINOPRIL (PRINIVIL;ZESTRIL) 20 MG  Active Member of Clubs or Organizations: Not on file    Attends Club or Organization Meetings: Not on file    Marital Status: Not on file   Intimate Partner Violence:     Fear of Current or Ex-Partner: Not on file    Emotionally Abused: Not on file    Physically Abused: Not on file    Sexually Abused: Not on file   Housing Stability:     Unable to Pay for Housing in the Last Year: Not on file    Number of [place] in the Last Year: Not on file    Unstable Housing in the Last Year: Not on file         PHYSICAL EXAM    (up to 7 for level 4, 8 or more for level 5)     ED Triage Vitals [11/23/21 1457]   BP Temp Temp Source Pulse Resp SpO2 Height Weight   136/83 97.7 °F (36.5 °C) Oral 80 20 100 % 5' 8\" (1.727 m) 212 lb (96.2 kg)       Physical Exam  General :Patient is awake, alert, oriented, in no acute distress, nontoxic appearing  HEENT: Pupils are equally round and reactive to light, EOMI, conjunctivae clear. Neck: Neck is supple, full range of motion, trachea midline  Cardiac: Heart regular rate, rhythm, no murmurs, rubs, or gallops  Lungs: Lungs are clear to auscultation, there is no wheezing, rhonchi, or rales. There is no use of accessory muscles. Chest wall: There is no tenderness to palpation over the chest wall or over ribs  Abdomen: Abdomen is soft, nontender, nondistended. There is no firm or pulsatile masses, no rebound rigidity or guarding. Musculoskeletal: 5 out of 5 strength in all 4 extremities. No focal muscle deficits are appreciated  Neuro: Motor intact, sensory intact, level of consciousness is normal,Dermatology: Skin is warm and dry  Psych: Mentation is grossly normal, cognition is grossly normal. Affect is appropriate.       DIAGNOSTIC RESULTS     EKG: All EKG's are interpreted by the Emergency Department Physician who either signs or Co-signs this chart in the absenceof a cardiologist.        RADIOLOGY:   Non-plain film images such as CT, Ultrasound and MRI are read by the Lipase 31.0 5.6 - 51.3 U/L   Urinalysis Reflex to Culture    Specimen: Urine, clean catch   Result Value Ref Range    Color, UA Yellow Straw/Yellow    Clarity, UA Clear Clear    Glucose, Ur Negative Negative mg/dL    Bilirubin Urine Negative Negative    Ketones, Urine Negative Negative mg/dL    Specific Gravity, UA 1.020 1.005 - 1.030    Blood, Urine TRACE-INTACT (A) Negative    pH, UA 5.5 5.0 - 8.0    Protein, UA Negative Negative mg/dL    Urobilinogen, Urine 0.2 <2.0 E.U./dL    Nitrite, Urine Negative Negative    Leukocyte Esterase, Urine Negative Negative    Microscopic Examination YES     Urine Type NotGiven     Urine Reflex to Culture Not Indicated    Microscopic Urinalysis   Result Value Ref Range    Mucus, UA 1+ (A) None Seen /LPF    WBC, UA 0-2 0 - 5 /HPF    RBC, UA 0-2 0 - 4 /HPF        All other labs were within normal range or not returned as of this dictation. EMERGENCY DEPARTMENT COURSE and DIFFERENTIAL DIAGNOSIS/MDM:   Vitals:    Vitals:    11/23/21 1457   BP: 136/83   Pulse: 80   Resp: 20   Temp: 97.7 °F (36.5 °C)   TempSrc: Oral   SpO2: 100%   Weight: 212 lb (96.2 kg)   Height: 5' 8\" (1.727 m)       MEDICATIONS ADMINISTERED IN ED:  Medications   lactated ringers bolus (1,000 mLs IntraVENous New Bag 11/23/21 1536)   ketorolac (TORADOL) injection 15 mg (15 mg IntraVENous Given 11/23/21 1537)       Has been fine he is sitting comfortably on the bed playing on his phone. All of his blood work is within normal limits urine is negative as discussed with him it could be an irritable bowel kind of finger early signs of a virus coming on but I will go ahead and discharge him to home at this time I will call him in some Bentyl for pain if he needs it and he was instructed to follow-up with his family physician should discontinue past the next 24 hours. The patient will follow-up with their PCP in 1-2 days for reevaluation.   If the patient or family members have anyfurther concerns or any worsening symptoms they will return to the ED for reevaluation. CONSULTS:  None    PROCEDURES:  Procedures    CRITICAL CARE TIME    Total Critical Care time was 0 minutes, excluding separately reportable procedures. There was a high probability of clinically significant/life threatening deterioration in the patient's condition which required my urgent intervention. FINAL IMPRESSION      1. Abdominal pain, right lower quadrant New Problem         DISPOSITION/PLAN   DISPOSITION Decision To Discharge 11/23/2021 05:19:37 PM  Will discharge to home    PATIENT REFERRED TO:  Karla Em 364  [phone]    In 3 days        DISCHARGE MEDICATIONS:  New Prescriptions    DICYCLOMINE (BENTYL) 10 MG CAPSULE    Take 1 capsule by mouth every 6 hours as needed (Bowel spasms)       Comment: Please note this report has been produced using speech recognition software and may contain errorsrelated to that system including errors in grammar, punctuation, and spelling, as well as words and phrases that may be inappropriate. If there are any questions or concerns please feel free to contact the dictating providerfor clarification.     Carlton Sal MD  Attending Emergency Physician              Carlton Sal MD  11/23/21 1580

## 2022-01-18 ENCOUNTER — TELEPHONE (OUTPATIENT)
Dept: PRIMARY CARE CLINIC | Age: 52
End: 2022-01-18

## 2022-01-18 ENCOUNTER — NURSE TRIAGE (OUTPATIENT)
Dept: OTHER | Facility: CLINIC | Age: 52
End: 2022-01-18

## 2022-01-18 NOTE — TELEPHONE ENCOUNTER
----- Message from Riki Rice sent at 1/18/2022  5:17 PM EST -----  Subject: Medication Problem    QUESTIONS  Name of Medication? lisinopril (PRINIVIL;ZESTRIL) 10 MG tablet  Patient-reported dosage and instructions? 1 tablet daily  What question or problem do you have with the medication? lightheadedness   with the medication   Preferred Pharmacy? [id number] [address] [phone] - f [phone]  Pharmacy phone number (if available)? [phone]  Additional Information for Provider?   ---------------------------------------------------------------------------  --------------  [address]  What is the best way for the office to contact you? OK to leave message on   voicemail  Preferred Call Back Phone Number? [phone]  ---------------------------------------------------------------------------  --------------  SCRIPT ANSWERS  Relationship to Patient? Self  Are you having trouble breathing? No  Do you have swelling of your face, tongue, or anywhere? No  (Is the patient requesting to be seen urgently for their symptoms?)? No  (If answered No to all RN Triage questions send Message to Provider)?  Yes

## 2022-01-18 NOTE — TELEPHONE ENCOUNTER
Received call from [facility] with Red Flag Complaint. Subjective: Caller states dizziness/lightheaded,thinks caused from B/P med    Current Symptoms: Moderate dizziness/lightheadedness    Onset: 3 weeks ago    Associated Symptoms: NA    Pain Severity: Denies    Temperature: Denies    What has been tried: Extra fluids    LMP: NA Pregnant: NA    Recommended disposition: See PCP within 24 hours. UCC/ER if no appt. available    Care advice provided, patient verbalizes understanding; denies any other questions or concerns; instructed to call back for any new or worsening symptoms. Outcome:Writer transferred caller to [facility]t. scheduling    Attention Provider: Thank you for allowing me to participate in the care of your patient. The patient was connected to triage in response to information provided to the ECC/PSC. Please do not respond through this encounter as the response is not directed to a shared pool.     Reason for Disposition   Taking a medicine that could cause dizziness (e.g., blood pressure medications, diuretics)    Protocols used: DIZZINESS Kathleen Quiroga

## 2022-01-19 ENCOUNTER — OFFICE VISIT (OUTPATIENT)
Dept: PRIMARY CARE CLINIC | Age: 52
End: 2022-01-19
Payer: MEDICAID

## 2022-01-19 VITALS
BODY MASS INDEX: 33.13 KG/M2 | TEMPERATURE: 97.5 F | DIASTOLIC BLOOD PRESSURE: 78 MMHG | HEART RATE: 78 BPM | RESPIRATION RATE: 16 BRPM | WEIGHT: 218.6 LBS | SYSTOLIC BLOOD PRESSURE: 114 MMHG | OXYGEN SATURATION: 97 % | HEIGHT: 68 IN

## 2022-01-19 DIAGNOSIS — I10 PRIMARY HYPERTENSION: Primary | ICD-10-CM

## 2022-01-19 PROCEDURE — G8427 DOCREV CUR MEDS BY ELIG CLIN: HCPCS | Performed by: NURSE PRACTITIONER

## 2022-01-19 PROCEDURE — G8417 CALC BMI ABV UP PARAM F/U: HCPCS | Performed by: NURSE PRACTITIONER

## 2022-01-19 PROCEDURE — 99213 OFFICE O/P EST LOW 20 MIN: CPT | Performed by: NURSE PRACTITIONER

## 2022-01-19 PROCEDURE — 4004F PT TOBACCO SCREEN RCVD TLK: CPT | Performed by: NURSE PRACTITIONER

## 2022-01-19 PROCEDURE — G8484 FLU IMMUNIZE NO ADMIN: HCPCS | Performed by: NURSE PRACTITIONER

## 2022-01-19 PROCEDURE — 3017F COLORECTAL CA SCREEN DOC REV: CPT | Performed by: NURSE PRACTITIONER

## 2022-01-19 RX ORDER — LISINOPRIL 5 MG/1
5 TABLET ORAL DAILY
Qty: 30 TABLET | Refills: 5 | Status: SHIPPED | OUTPATIENT
Start: 2022-01-19 | End: 2022-08-15

## 2022-01-19 ASSESSMENT — ENCOUNTER SYMPTOMS
RESPIRATORY NEGATIVE: 1
EYES NEGATIVE: 1
ALLERGIC/IMMUNOLOGIC NEGATIVE: 1
GASTROINTESTINAL NEGATIVE: 1

## 2022-01-19 NOTE — PROGRESS NOTES
(1.727 m)   Wt 218 lb 9.6 oz (99.2 kg)   SpO2 97% Comment: room air  BMI 33.24 kg/m²    Physical Exam    Results in Past 30 Days  Result Component Current Result Ref Range Previous Result Ref Range   Albumin/Globulin Ratio 1.5 (1/28/2022) 0.8 - 2.0 Not in Time Range    Albumin 4.5 (1/28/2022) 3.4 - 4.8 g/dL Not in Time Range    Alkaline Phosphatase 36 (1/28/2022) 25 - 100 U/L Not in Time Range    ALT 19 (1/28/2022) 4 - 36 U/L Not in Time Range    AST 18 (1/28/2022) 8 - 33 U/L Not in Time Range    BUN 12 (1/28/2022) 6 - 20 mg/dL Not in Time Range    Calcium 9.3 (1/28/2022) 8.5 - 10.5 mg/dL Not in Time Range    Chloride 101 (1/28/2022) 98 - 107 mmol/L Not in Time Range    CO2 25 (1/28/2022) 20 - 30 mmol/L Not in Time Range    CREATININE 1.0 (1/28/2022) 0.4 - 1.2 mg/dL Not in Time Range    GFR  >59 (1/28/2022) >59 Not in Time Range    GFR Non- >59 (1/28/2022) >59 Not in Time Range    Globulin 3.0 (1/28/2022) Not Established g/dL Not in Time Range    Glucose 115 (H) (1/28/2022) 74 - 106 mg/dL Not in Time Range    Potassium reflex Magnesium 4.2 (1/28/2022) 3.4 - 5.1 mmol/L Not in Time Range    Sodium 137 (1/28/2022) 136 - 145 mmol/L Not in Time Range    Total Bilirubin 0.6 (1/28/2022) 0.3 - 1.2 mg/dL Not in Time Range    Total Protein 7.5 (1/28/2022) 6.4 - 8.3 g/dL Not in Time Range        Hemoglobin A1C (%)   Date Value   11/16/2021 5.4     Microscopic Examination (no units)   Date Value   11/23/2021 YES     LDL Calculated (mg/dL)   Date Value   11/16/2021 120         Lab Results   Component Value Date    WBC 7.2 01/28/2022    NEUTROABS 3.3 01/28/2022    HGB 13.8 01/28/2022    HCT 40.8 01/28/2022    MCV 89.7 01/28/2022     01/28/2022       Lab Results   Component Value Date    TSH 2.90 11/16/2021         ASSESSMENT/PLAN:     1. Primary hypertension  Stable on Lisinopril  - lisinopril (PRINIVIL;ZESTRIL) 5 MG tablet; Take 1 tablet by mouth daily  Dispense: 30 tablet;  Refill: 5 Written by Kassandra NICOLE, acting as a scribe for Justice Selvin on 1/19/2022 at 10:37 PM.

## 2022-01-28 ENCOUNTER — HOSPITAL ENCOUNTER (EMERGENCY)
Facility: HOSPITAL | Age: 52
Discharge: HOME | End: 2022-01-28
Attending: EMERGENCY MEDICINE
Payer: MEDICAID

## 2022-01-28 ENCOUNTER — APPOINTMENT (OUTPATIENT)
Dept: GENERAL RADIOLOGY | Facility: HOSPITAL | Age: 52
End: 2022-01-28
Payer: MEDICAID

## 2022-01-28 VITALS
DIASTOLIC BLOOD PRESSURE: 73 MMHG | HEART RATE: 53 BPM | SYSTOLIC BLOOD PRESSURE: 102 MMHG | RESPIRATION RATE: 18 BRPM | OXYGEN SATURATION: 97 %

## 2022-01-28 DIAGNOSIS — R07.89 OTHER CHEST PAIN: Primary | ICD-10-CM

## 2022-01-28 LAB
A/G RATIO: 1.5 (ref 0.8–2)
ALBUMIN SERPL-MCNC: 4.5 G/DL (ref 3.4–4.8)
ALP BLD-CCNC: 36 U/L (ref 25–100)
ALT SERPL-CCNC: 19 U/L (ref 4–36)
ANION GAP SERPL CALCULATED.3IONS-SCNC: 11 MMOL/L (ref 3–16)
APTT: 25.3 SEC (ref 22.5–34.9)
AST SERPL-CCNC: 18 U/L (ref 8–33)
BASOPHILS ABSOLUTE: 0.1 K/UL (ref 0–0.1)
BASOPHILS RELATIVE PERCENT: 0.8 %
BILIRUB SERPL-MCNC: 0.6 MG/DL (ref 0.3–1.2)
BUN BLDV-MCNC: 12 MG/DL (ref 6–20)
CALCIUM SERPL-MCNC: 9.3 MG/DL (ref 8.5–10.5)
CHLORIDE BLD-SCNC: 101 MMOL/L (ref 98–107)
CO2: 25 MMOL/L (ref 20–30)
CREAT SERPL-MCNC: 1 MG/DL (ref 0.4–1.2)
EOSINOPHILS ABSOLUTE: 0.4 K/UL (ref 0–0.4)
EOSINOPHILS RELATIVE PERCENT: 5.9 %
GFR AFRICAN AMERICAN: >59
GFR NON-AFRICAN AMERICAN: >59
GLOBULIN: 3 G/DL
GLUCOSE BLD-MCNC: 115 MG/DL (ref 74–106)
HCT VFR BLD CALC: 40.8 % (ref 40–54)
HEMOGLOBIN: 13.8 G/DL (ref 13–18)
IMMATURE GRANULOCYTES #: 0 K/UL
IMMATURE GRANULOCYTES %: 0.3 % (ref 0–5)
INR BLD: 1.01 (ref 0.88–1.11)
LIPASE: 27 U/L (ref 5.6–51.3)
LYMPHOCYTES ABSOLUTE: 2.8 K/UL (ref 1.5–4)
LYMPHOCYTES RELATIVE PERCENT: 39.6 %
MCH RBC QN AUTO: 30.3 PG (ref 27–32)
MCHC RBC AUTO-ENTMCNC: 33.8 G/DL (ref 31–35)
MCV RBC AUTO: 89.7 FL (ref 80–100)
MONOCYTES ABSOLUTE: 0.5 K/UL (ref 0.2–0.8)
MONOCYTES RELATIVE PERCENT: 7.4 %
NEUTROPHILS ABSOLUTE: 3.3 K/UL (ref 2–7.5)
NEUTROPHILS RELATIVE PERCENT: 46 %
PDW BLD-RTO: 12.7 % (ref 11–16)
PLATELET # BLD: 251 K/UL (ref 150–400)
PMV BLD AUTO: 10.6 FL (ref 6–10)
POTASSIUM REFLEX MAGNESIUM: 4.2 MMOL/L (ref 3.4–5.1)
PROTHROMBIN TIME: 12.8 SEC (ref 11.6–13.8)
RBC # BLD: 4.55 M/UL (ref 4.5–6)
SODIUM BLD-SCNC: 137 MMOL/L (ref 136–145)
TOTAL PROTEIN: 7.5 G/DL (ref 6.4–8.3)
TROPONIN: <0.3 NG/ML
TROPONIN: <0.3 NG/ML
WBC # BLD: 7.2 K/UL (ref 4–11)

## 2022-01-28 PROCEDURE — 83690 ASSAY OF LIPASE: CPT

## 2022-01-28 PROCEDURE — 85730 THROMBOPLASTIN TIME PARTIAL: CPT

## 2022-01-28 PROCEDURE — 85610 PROTHROMBIN TIME: CPT

## 2022-01-28 PROCEDURE — 36415 COLL VENOUS BLD VENIPUNCTURE: CPT

## 2022-01-28 PROCEDURE — 6370000000 HC RX 637 (ALT 250 FOR IP): Performed by: EMERGENCY MEDICINE

## 2022-01-28 PROCEDURE — 99284 EMERGENCY DEPT VISIT MOD MDM: CPT

## 2022-01-28 PROCEDURE — 84484 ASSAY OF TROPONIN QUANT: CPT

## 2022-01-28 PROCEDURE — 80053 COMPREHEN METABOLIC PANEL: CPT

## 2022-01-28 PROCEDURE — 93005 ELECTROCARDIOGRAM TRACING: CPT

## 2022-01-28 PROCEDURE — 85025 COMPLETE CBC W/AUTO DIFF WBC: CPT

## 2022-01-28 PROCEDURE — 71045 X-RAY EXAM CHEST 1 VIEW: CPT

## 2022-01-28 RX ORDER — ASPIRIN 81 MG/1
324 TABLET, CHEWABLE ORAL DAILY
Status: DISCONTINUED | OUTPATIENT
Start: 2022-01-28 | End: 2022-01-28 | Stop reason: HOSPADM

## 2022-01-28 RX ORDER — NITROGLYCERIN 0.4 MG/1
0.4 TABLET SUBLINGUAL EVERY 5 MIN PRN
Status: DISCONTINUED | OUTPATIENT
Start: 2022-01-28 | End: 2022-01-28 | Stop reason: HOSPADM

## 2022-01-28 RX ORDER — ACETAMINOPHEN 325 MG/1
650 TABLET ORAL ONCE
Status: COMPLETED | OUTPATIENT
Start: 2022-01-28 | End: 2022-01-28

## 2022-01-28 RX ADMIN — NITROGLYCERIN 1 INCH: 20 OINTMENT TOPICAL at 10:11

## 2022-01-28 RX ADMIN — Medication 0.4 MG: at 09:43

## 2022-01-28 RX ADMIN — ACETAMINOPHEN 650 MG: 325 TABLET, FILM COATED ORAL at 09:48

## 2022-01-28 RX ADMIN — ASPIRIN 324 MG: 81 TABLET, CHEWABLE ORAL at 09:33

## 2022-01-28 RX ADMIN — Medication 0.4 MG: at 09:33

## 2022-01-28 ASSESSMENT — PAIN SCALES - GENERAL
PAINLEVEL_OUTOF10: 8
PAINLEVEL_OUTOF10: 6
PAINLEVEL_OUTOF10: 0
PAINLEVEL_OUTOF10: 3

## 2022-01-28 ASSESSMENT — HEART SCORE
ECG: 0
ECG: 0

## 2022-01-28 ASSESSMENT — PAIN DESCRIPTION - LOCATION
LOCATION: CHEST
LOCATION: CHEST

## 2022-01-28 ASSESSMENT — PAIN DESCRIPTION - PAIN TYPE
TYPE: ACUTE PAIN
TYPE: ACUTE PAIN

## 2022-01-28 ASSESSMENT — PAIN DESCRIPTION - DESCRIPTORS: DESCRIPTORS: SQUEEZING;CONSTANT

## 2022-01-28 ASSESSMENT — PAIN DESCRIPTION - FREQUENCY: FREQUENCY: CONTINUOUS

## 2022-01-28 ASSESSMENT — PAIN DESCRIPTION - ORIENTATION: ORIENTATION: LEFT

## 2022-01-28 NOTE — ED NOTES
Dc instructions given to patient at this time, patient to call to follow up with cardiology for first available appointment in [facility], patient given phone number to call Dr Rod Syed at this time. Patient with no other questions or concerns, patient encouraged to return for further problems.      Isamar Sánchez RN  01/28/22 Lili Mcnally RN  01/28/22 1406

## 2022-01-28 NOTE — ED PROVIDER NOTES
[address] [address] [facility] ENCOUNTER      Pt Name: Frank Gagnon  MRN: [id number]  YOB: 1970  Date of evaluation: 1/28/2022  Provider: Elsa Zamorano MD    CHIEF COMPLAINT       Chief Complaint   Patient presents with    Chest Pain         HISTORY OF PRESENT ILLNESS  (Location/Symptom, Timing/Onset, Context/Setting, Quality, Duration, Modifying Factors, Severity.)   Frank Gagnon is a 46 y.o. male who presents to the emergency department complaining of left anterior chest pain which woke him up from sleep about 2 hours ago he states is a squeezing type of pain nothing changes H he currently rates it an 8 out of 10 he has not had any diaphoresis he has been a little short of breath no nausea he related something about his aorta from the past on looking at his old records 3 months ago he was here saying he felt his heart beating in his stomach ultrasound was done in the ER of his aorta which was normal and he was told that he will probably feeling his pulse and that it was making him feel funny because of his high blood pressure he was placed on lisinopril at that time he does not smoke he has no other medical history he does have a significant family history of a mother with heart disease which began in her 45s. Nursing notes were reviewed. REVIEW OFSYSTEMS    (2-9 systems for level 4, 10 or more for level 5)   ROS:  General:  No fevers, no chills, no weakness  Cardiovascular:  + chest pain, no palpitations  Respiratory:  + shortness of breath, no cough, no wheezing  Gastrointestinal:  no pain, no nausea, no vomiting, no diarrhea  Musculoskeletal:  No muscle pain, no joint pain  Skin:  No rash, no easy bruising  Neurologic:  No speech problems, no headache, no extremity weakness  Psychiatric:  No anxiety  Genitourinary:  No dysuria, no hematuria    Except as noted above the remainder of the review of systems was reviewed and negative.        PAST MEDICAL HISTORY     Past Medical History:   Diagnosis Date    Chronic back pain     GERD (gastroesophageal reflux disease)     Headache     Hypertension          SURGICAL HISTORY     History reviewed. No pertinent surgical history. CURRENT MEDICATIONS       Previous Medications    LISINOPRIL (PRINIVIL;ZESTRIL) 5 MG TABLET    Take 1 tablet by mouth daily    PANTOPRAZOLE (PROTONIX) 40 MG TABLET    Take 1 tablet by mouth every morning (before breakfast)    SIMETHICONE (MYLICON) 80 MG CHEWABLE TABLET    Take 1 tablet by mouth every 6 hours as needed (gas pain, abdominal bloating)       ALLERGIES     Shrimp (diagnostic) and Penicillins    FAMILY HISTORY       Family History   Problem Relation Age of Onset    Cancer Mother         LUNG    Cancer Father         LUNG          SOCIAL HISTORY       Social History     Socioeconomic History    Marital status: Unknown     Spouse name: None    Number of children: None    Years of education: None    Highest education level: None   Occupational History    None   Tobacco Use    Smoking status: Former Smoker     Packs/day: 0.50     Years: 40.00     Pack years: 20.00     Types: Cigarettes     Quit date: 2019     Years since quittin.7    Smokeless tobacco: Current User     Types: Chew   Substance and Sexual Activity    Alcohol use: No    Drug use: No    Sexual activity: None   Other Topics Concern    None   Social History Narrative    None     Social Determinants of Health     Financial Resource Strain: Low Risk     Difficulty of Paying Living Expenses: Not hard at all   Food Insecurity: No Food Insecurity    Worried About Running Out of Food in the Last Year: Never true    Rich of Food in the Last Year: Never true   Transportation Needs:     Lack of Transportation (Medical): Not on file    Lack of Transportation (Non-Medical):  Not on file   Physical Activity:     Days of Exercise per Week: Not on file    Minutes of Exercise per Session: Not on file   Stress:     Feeling of Stress : Not on file   Social Connections:     Frequency of Communication with Friends and Family: Not on file    Frequency of Social Gatherings with Friends and Family: Not on file    Attends [employer] Services: Not on file    Active Member of Clubs or Organizations: Not on file    Attends Club or Organization Meetings: Not on file    Marital Status: Not on file   Intimate Partner Violence:     Fear of Current or Ex-Partner: Not on file    Emotionally Abused: Not on file    Physically Abused: Not on file    Sexually Abused: Not on file   Housing Stability:     Unable to Pay for Housing in the Last Year: Not on file    Number of [place] in the Last Year: Not on file    Unstable Housing in the Last Year: Not on file         PHYSICAL EXAM    (up to 7 for level 4, 8 or more for level 5)     ED Triage Vitals   BP Temp Temp src Pulse Resp SpO2 Height Weight   -- -- -- -- -- -- -- --       Physical Exam  General :Patient is awake, alert, oriented, in no acute distress, nontoxic appearing  HEENT: Pupils are equally round and reactive to light, EOMI, conjunctivae clear. Oral mucosa is moist, no exudate. Uvula is midline  Neck: Neck is supple, full range of motion, trachea midline  Cardiac: Heart regular rate, rhythm, no murmurs, rubs, or gallops  Lungs: Lungs are clear to auscultation, there is no wheezing, rhonchi, or rales. There is no use of accessory muscles. Chest wall: There is slight tenderness to palpation over the chest wall just to the left lateral aspect of his sternum  Abdomen: Abdomen is soft, nontender, nondistended. There is no firm or pulsatile masses, no rebound rigidity or guarding. Musculoskeletal: 5 out of 5 strength in all 4 extremities. No focal muscle deficits are appreciated  Neuro:  Motor intact, sensory intact, level of consciousness is normal,Dermatology: Skin is warm and dry  Psych: Mentation is grossly normal, cognition is grossly normal. Affect is appropriate. DIAGNOSTIC RESULTS     EKG: All EKG's are interpreted by the Emergency Department Physician who either signs or Co-signs this chart in the [address] a cardiologist.    The EKG interpreted by me shows EKG sinus rhythm rate of 68 occasional PACs no acute ST changes    RADIOLOGY:   Non-plain film images such as CT, Ultrasound and MRI are read by the radiologist. Plain radiographic images are visualized and preliminarily interpreted by the emergency physician with the below findings:      ? Radiologist's Report Reviewed:  XR CHEST PORTABLE   Final Result      1. No acute disease.                ED BEDSIDE ULTRASOUND:   Performed by ED Physician - none    LABS:    I have reviewed and interpreted all of the currently available lab results from this visit (ifapplicable):  Results for orders placed or performed during the hospital encounter of 01/28/22   CBC Auto Differential   Result Value Ref Range    WBC 7.2 4.0 - 11.0 K/uL    RBC 4.55 4.50 - 6.00 M/uL    Hemoglobin 13.8 13.0 - 18.0 g/dL    Hematocrit 40.8 40.0 - 54.0 %    MCV 89.7 80.0 - 100.0 fL    MCH 30.3 27.0 - 32.0 pg    MCHC 33.8 31.0 - 35.0 g/dL    RDW 12.7 11.0 - 16.0 %    Platelets 141 164 - 985 K/uL    MPV 10.6 (H) 6.0 - 10.0 fL    Neutrophils % 46.0 %    Immature Granulocytes % 0.3 0.0 - 5.0 %    Lymphocytes % 39.6 %    Monocytes % 7.4 %    Eosinophils % 5.9 %    Basophils % 0.8 %    Neutrophils Absolute 3.3 2.0 - 7.5 K/uL    Immature Granulocytes # 0.0 K/uL    Lymphocytes Absolute 2.8 1.5 - 4.0 K/uL    Monocytes Absolute 0.5 0.2 - 0.8 K/uL    Eosinophils Absolute 0.4 0.0 - 0.4 K/uL    Basophils Absolute 0.1 0.0 - 0.1 K/uL   Comprehensive Metabolic Panel w/ Reflex to MG   Result Value Ref Range    Sodium 137 136 - 145 mmol/L    Potassium reflex Magnesium 4.2 3.4 - 5.1 mmol/L    Chloride 101 98 - 107 mmol/L    CO2 25 20 - 30 mmol/L    Anion Gap 11 3 - 16    Glucose 115 (H) 74 - 106 mg/dL    BUN 12 6 - 20 mg/dL    CREATININE 1.0 0.4 - 1.2 mg/dL    GFR Non-African American >59 >59    GFR African American >59 >59    Calcium 9.3 8.5 - 10.5 mg/dL    Total Protein 7.5 6.4 - 8.3 g/dL    Albumin 4.5 3.4 - 4.8 g/dL    Albumin/Globulin Ratio 1.5 0.8 - 2.0    Total Bilirubin 0.6 0.3 - 1.2 mg/dL    Alkaline Phosphatase 36 25 - 100 U/L    ALT 19 4 - 36 U/L    AST 18 8 - 33 U/L    Globulin 3.0 Not Established g/dL   Troponin   Result Value Ref Range    Troponin <0.30 <0.30 ng/mL   Lipase   Result Value Ref Range    Lipase 27.0 5.6 - 51.3 U/L   Protime-INR   Result Value Ref Range    Protime 12.8 11.6 - 13.8 sec    INR 1.01 0.88 - 1.11   APTT   Result Value Ref Range    aPTT 25.3 22.5 - 34.9 sec   Troponin   Result Value Ref Range    Troponin <0.30 <0.30 ng/mL        All other labs were within normal range or not returned as of this dictation. EMERGENCY DEPARTMENT COURSE and DIFFERENTIAL DIAGNOSIS/MDM:   Vitals:    Vitals:    01/28/22 1210 01/28/22 1228 01/28/22 1248 01/28/22 1258   BP: 96/64 93/66 104/68 99/72   Pulse: 53 54 62 62   Resp: 16 15 15 17   SpO2: 97% 97% 98% 97%       MEDICATIONS ADMINISTERED IN ED:  Medications   aspirin chewable tablet 324 mg (324 mg Oral Given 1/28/22 0933)   nitroGLYCERIN (NITROSTAT) SL tablet 0.4 mg (0.4 mg SubLINGual Given 1/28/22 0943)   acetaminophen (TYLENOL) tablet 650 mg (650 mg Oral Given 1/28/22 0948)   nitroglycerin (NITRO-BID) 2 % ointment 1 inch (1 inch Topical Given 1/28/22 1011)       Patient's lab work is within normal limits EKG is normal chest x-ray shows no acute disease his heart score is a 2 making him low risk for this being a cardiac problem however his pain did get slightly better after a sublingual nitro. Patient states his pain is resolved after a second sublingual nitro. Has remained pain-free second troponin III hours after the first remains negative.   With patient being low risk for this to be acute cardiac syndrome I will discharge the patient to home to follow-up with cardiology in the next 3 or 4 days. Is also instructed that should his symptoms get much worse he should come back. The patient will follow-up with their PCP in 1-2 days for reevaluation. If the patient or family members have anyfurther concerns or any worsening symptoms they will return to the ED for reevaluation. CONSULTS:  None    PROCEDURES:  Procedures    CRITICAL CARE TIME    Total Critical Care time was 0 minutes, excluding separately reportable procedures. There was a high probability of clinically significant/life threatening deterioration in the patient's condition which required my urgent intervention. FINAL IMPRESSION      1. Other chest pain New Problem         DISPOSITION/PLAN   DISPOSITION    Improved discharge to home    PATIENT REFERRED TO:  Cardiology    Call in 3 days        DISCHARGE MEDICATIONS:  New Prescriptions    No medications on file       Comment: Please note this report has been produced using speech recognition software and may contain errorsrelated to that system including errors in grammar, punctuation, and spelling, as well as words and phrases that may be inappropriate. If there are any questions or concerns please feel free to contact the dictating providerfor clarification.     Daquan Trujillo MD  Attending Emergency Physician              Daquan Trujillo MD  01/28/22 3919

## 2022-04-08 NOTE — ED TRIAGE NOTES
Jim Ferminbernard was seen and treated in our emergency department on 4/8/2022  Diagnosis:     Tejal    She may return on this date: Off work as needed thru April 15th; If you have any questions or concerns, please don't hesitate to call        Raymon Higgins MD    ______________________________           _______________          _______________  [facility] Representative                              Date                                Time Patient ambulated in the exam room without difficulty. Patient C/O abdominal pain RLQ 6/10 for the past 7 days. Non radiating. Will continue to monitor.

## 2022-08-13 DIAGNOSIS — I10 PRIMARY HYPERTENSION: ICD-10-CM

## 2022-08-15 RX ORDER — LISINOPRIL 5 MG/1
5 TABLET ORAL DAILY
Qty: 30 TABLET | Refills: 0 | Status: SHIPPED | OUTPATIENT
Start: 2022-08-15

## 2022-09-02 ENCOUNTER — HOSPITAL ENCOUNTER (EMERGENCY)
Facility: HOSPITAL | Age: 52
Discharge: HOME | End: 2022-09-02
Attending: EMERGENCY MEDICINE | Admitting: EMERGENCY MEDICINE

## 2022-09-02 ENCOUNTER — APPOINTMENT (OUTPATIENT)
Dept: GENERAL RADIOLOGY | Facility: HOSPITAL | Age: 52
End: 2022-09-02

## 2022-09-02 VITALS
HEIGHT: 68 IN | SYSTOLIC BLOOD PRESSURE: 112 MMHG | OXYGEN SATURATION: 98 % | RESPIRATION RATE: 22 BRPM | DIASTOLIC BLOOD PRESSURE: 72 MMHG | BODY MASS INDEX: 32.89 KG/M2 | WEIGHT: 217 LBS | TEMPERATURE: 98 F

## 2022-09-02 DIAGNOSIS — R07.9 CHEST PAIN, UNSPECIFIED TYPE: Primary | ICD-10-CM

## 2022-09-02 LAB
ALBUMIN SERPL-MCNC: 4.7 G/DL (ref 3.5–5.2)
ALBUMIN/GLOB SERPL: 1.6 G/DL
ALP SERPL-CCNC: 51 U/L (ref 39–117)
ALT SERPL W P-5'-P-CCNC: 26 U/L (ref 1–41)
ANION GAP SERPL CALCULATED.3IONS-SCNC: 8.6 MMOL/L (ref 5–15)
AST SERPL-CCNC: 16 U/L (ref 1–40)
BASOPHILS # BLD AUTO: 0.05 10*3/MM3 (ref 0–0.2)
BASOPHILS NFR BLD AUTO: 0.6 % (ref 0–1.5)
BILIRUB SERPL-MCNC: 0.6 MG/DL (ref 0–1.2)
BUN SERPL-MCNC: 9 MG/DL (ref 6–20)
BUN/CREAT SERPL: 9.4 (ref 7–25)
CALCIUM SPEC-SCNC: 10.6 MG/DL (ref 8.6–10.5)
CHLORIDE SERPL-SCNC: 103 MMOL/L (ref 98–107)
CO2 SERPL-SCNC: 28.4 MMOL/L (ref 22–29)
CREAT SERPL-MCNC: 0.96 MG/DL (ref 0.76–1.27)
DEPRECATED RDW RBC AUTO: 39.8 FL (ref 37–54)
EGFRCR SERPLBLD CKD-EPI 2021: 93.9 ML/MIN/1.73
EOSINOPHIL # BLD AUTO: 0.41 10*3/MM3 (ref 0–0.4)
EOSINOPHIL NFR BLD AUTO: 4.6 % (ref 0.3–6.2)
ERYTHROCYTE [DISTWIDTH] IN BLOOD BY AUTOMATED COUNT: 12.4 % (ref 12.3–15.4)
GLOBULIN UR ELPH-MCNC: 2.9 GM/DL
GLUCOSE SERPL-MCNC: 95 MG/DL (ref 65–99)
HCT VFR BLD AUTO: 40.9 % (ref 37.5–51)
HGB BLD-MCNC: 14.5 G/DL (ref 13–17.7)
HOLD SPECIMEN: NORMAL
IMM GRANULOCYTES # BLD AUTO: 0.03 10*3/MM3 (ref 0–0.05)
IMM GRANULOCYTES NFR BLD AUTO: 0.3 % (ref 0–0.5)
LYMPHOCYTES # BLD AUTO: 2.48 10*3/MM3 (ref 0.7–3.1)
LYMPHOCYTES NFR BLD AUTO: 28.1 % (ref 19.6–45.3)
MCH RBC QN AUTO: 31.2 PG (ref 26.6–33)
MCHC RBC AUTO-ENTMCNC: 35.5 G/DL (ref 31.5–35.7)
MCV RBC AUTO: 88 FL (ref 79–97)
MONOCYTES # BLD AUTO: 0.59 10*3/MM3 (ref 0.1–0.9)
MONOCYTES NFR BLD AUTO: 6.7 % (ref 5–12)
NEUTROPHILS NFR BLD AUTO: 5.26 10*3/MM3 (ref 1.7–7)
NEUTROPHILS NFR BLD AUTO: 59.7 % (ref 42.7–76)
NRBC BLD AUTO-RTO: 0 /100 WBC (ref 0–0.2)
PLATELET # BLD AUTO: 258 10*3/MM3 (ref 140–450)
PMV BLD AUTO: 10.6 FL (ref 6–12)
POTASSIUM SERPL-SCNC: 4.6 MMOL/L (ref 3.5–5.2)
PROT SERPL-MCNC: 7.6 G/DL (ref 6–8.5)
RBC # BLD AUTO: 4.65 10*6/MM3 (ref 4.14–5.8)
SODIUM SERPL-SCNC: 140 MMOL/L (ref 136–145)
TROPONIN T SERPL-MCNC: <0.01 NG/ML (ref 0–0.03)
WBC NRBC COR # BLD: 8.82 10*3/MM3 (ref 3.4–10.8)
WHOLE BLOOD HOLD COAG: NORMAL
WHOLE BLOOD HOLD SPECIMEN: NORMAL

## 2022-09-02 PROCEDURE — 80053 COMPREHEN METABOLIC PANEL: CPT | Performed by: EMERGENCY MEDICINE

## 2022-09-02 PROCEDURE — 71045 X-RAY EXAM CHEST 1 VIEW: CPT

## 2022-09-02 PROCEDURE — 36415 COLL VENOUS BLD VENIPUNCTURE: CPT

## 2022-09-02 PROCEDURE — 93005 ELECTROCARDIOGRAM TRACING: CPT | Performed by: PHYSICIAN ASSISTANT

## 2022-09-02 PROCEDURE — 84484 ASSAY OF TROPONIN QUANT: CPT | Performed by: EMERGENCY MEDICINE

## 2022-09-02 PROCEDURE — 85025 COMPLETE CBC W/AUTO DIFF WBC: CPT | Performed by: EMERGENCY MEDICINE

## 2022-09-02 PROCEDURE — 99283 EMERGENCY DEPT VISIT LOW MDM: CPT

## 2022-09-02 RX ORDER — SODIUM CHLORIDE 0.9 % (FLUSH) 0.9 %
10 SYRINGE (ML) INJECTION AS NEEDED
Status: DISCONTINUED | OUTPATIENT
Start: 2022-09-02 | End: 2022-09-02 | Stop reason: HOSPADM

## 2022-09-02 RX ORDER — ASPIRIN 325 MG
325 TABLET ORAL ONCE
Status: COMPLETED | OUTPATIENT
Start: 2022-09-02 | End: 2022-09-02

## 2022-09-02 RX ADMIN — ASPIRIN 325 MG ORAL TABLET 325 MG: 325 PILL ORAL at 12:12

## 2022-09-02 NOTE — ED PROVIDER NOTES
Subjective   Mr. Gonzalez is a 52-year-old male with no significant past medical history who presents today with chest pain for the past 3 days.  He describes the chest pain as left-sided and does not radiate.  He states that last night the chest pain kept him up all night despite aspirin use.  This morning he took an aspirin and endorsed improvement of the pain. He was seen in the ED one month ago for similar symptoms, and discharged with cardio follow-up. He denies following up with cardio since his last visit.  He was also endorsing shortness of breath.  He denies any pleuritic chest pain, leg edema, or any other symptoms at this time.      History provided by:  Patient   used: No        Review of Systems   Respiratory: Positive for shortness of breath.    Cardiovascular: Positive for chest pain.   All other systems reviewed and are negative.      Past Medical History:   Diagnosis Date   • Acne vulgaris    • Chronic back pain    • GERD (gastroesophageal reflux disease)    • PTSD (post-traumatic stress disorder)        Allergies   Allergen Reactions   • Shrimp Anaphylaxis   • Penicillins        No past surgical history on file.    No family history on file.    Social History     Socioeconomic History   • Marital status:    Tobacco Use   • Smoking status: Current Every Day Smoker     Types: Cigarettes   • Smokeless tobacco: Current User     Types: Snuff   Substance and Sexual Activity   • Alcohol use: No           Objective   Physical Exam  Vitals and nursing note reviewed.   Constitutional:       Appearance: He is well-developed.   HENT:      Head: Normocephalic and atraumatic.      Mouth/Throat:      Mouth: Mucous membranes are moist.   Eyes:      Extraocular Movements: Extraocular movements intact.   Cardiovascular:      Rate and Rhythm: Normal rate and regular rhythm.   Pulmonary:      Effort: Pulmonary effort is normal.      Breath sounds: Normal breath sounds.   Abdominal:       Palpations: Abdomen is soft.   Musculoskeletal:         General: Normal range of motion.      Cervical back: Normal range of motion.   Skin:     General: Skin is warm and dry.   Neurological:      Mental Status: He is alert and oriented to person, place, and time.   Psychiatric:         Behavior: Behavior normal.         Thought Content: Thought content normal.         Judgment: Judgment normal.         Procedures           ED Course  ED Course as of 09/02/22 1231   Fri Sep 02, 2022   1116 EKG interpreted by me.  Sinus rhythm.  Rate of 61.   No ST segment or T wave changes.  Normal EKG [CG]   1228 Discussed the results with the patient, recommended close follow-up with a cardiologist and return if symptoms worsen [CS]      ED Course User Index  [CG] Emmanuel Blue,   [CS] Abdullahi Larry Jr., YOSELIN                      HEART Score: 2                      MDM  Number of Diagnoses or Management Options  Chest pain, unspecified type: new and requires workup     Amount and/or Complexity of Data Reviewed  Clinical lab tests: reviewed  Tests in the radiology section of CPT®: reviewed  Tests in the medicine section of CPT®: reviewed    Risk of Complications, Morbidity, and/or Mortality  Presenting problems: moderate  Diagnostic procedures: low  Management options: moderate  General comments: 52-year-old male with 3 days of chest pain, has had previous chest pains in the past, 3 emergency visits in the last year and a half.  He reports that he has not followed up with a cardiologist as recommended.  He does have a history of hypertension and hyperlipidemia, troponin negative here, patient is remained stable and he is symptom-free at this time.  He can be safely discharged home with close follow-up        Final diagnoses:   Chest pain, unspecified type       ED Disposition  ED Disposition     ED Disposition   Discharge    Condition   Stable    Comment   --             Corky Wolfe MD  [address] [facility]  [address]  [address] [address]  [phone]    Schedule an appointment as soon as possible for a visit            Medication List      No changes were made to your prescriptions during this visit.          Abdullahi Larry Jr., YOSELIN  09/02/22 1236

## 2022-09-15 ENCOUNTER — HOSPITAL ENCOUNTER (EMERGENCY)
Facility: HOSPITAL | Age: 52
Discharge: HOME | End: 2022-09-15
Attending: EMERGENCY MEDICINE
Payer: MEDICAID

## 2022-09-15 ENCOUNTER — APPOINTMENT (OUTPATIENT)
Dept: CT IMAGING | Facility: HOSPITAL | Age: 52
End: 2022-09-15
Payer: MEDICAID

## 2022-09-15 VITALS
SYSTOLIC BLOOD PRESSURE: 112 MMHG | OXYGEN SATURATION: 99 % | DIASTOLIC BLOOD PRESSURE: 78 MMHG | RESPIRATION RATE: 25 BRPM | HEART RATE: 72 BPM | TEMPERATURE: 98.5 F

## 2022-09-15 DIAGNOSIS — R56.9 SEIZURE (HCC): Primary | ICD-10-CM

## 2022-09-15 PROBLEM — Z72.0 TOBACCO USE: Status: ACTIVE | Noted: 2022-09-15

## 2022-09-15 LAB
A/G RATIO: 1.7 (ref 0.8–2)
ALBUMIN SERPL-MCNC: 4.8 G/DL (ref 3.4–4.8)
ALP BLD-CCNC: 51 U/L (ref 25–100)
ALT SERPL-CCNC: 26 U/L (ref 4–36)
AMPHETAMINE SCREEN, URINE: NORMAL
ANION GAP SERPL CALCULATED.3IONS-SCNC: 11 MMOL/L (ref 3–16)
AST SERPL-CCNC: 18 U/L (ref 8–33)
BARBITURATE SCREEN URINE: NORMAL
BASOPHILS ABSOLUTE: 0.1 K/UL (ref 0–0.1)
BASOPHILS RELATIVE PERCENT: 0.8 %
BENZODIAZEPINE SCREEN, URINE: NORMAL
BILIRUB SERPL-MCNC: 0.6 MG/DL (ref 0.3–1.2)
BILIRUBIN URINE: NEGATIVE
BLOOD, URINE: ABNORMAL
BUN BLDV-MCNC: 10 MG/DL (ref 6–20)
BUPRENORPHINE QUAL, URINE: NORMAL
CALCIUM SERPL-MCNC: 10 MG/DL (ref 8.5–10.5)
CANNABINOID SCREEN URINE: NORMAL
CHLORIDE BLD-SCNC: 102 MMOL/L (ref 98–107)
CLARITY: CLEAR
CO2: 26 MMOL/L (ref 20–30)
COCAINE METABOLITE SCREEN URINE: NORMAL
COLOR: YELLOW
CREAT SERPL-MCNC: 1 MG/DL (ref 0.4–1.2)
EOSINOPHILS ABSOLUTE: 0.4 K/UL (ref 0–0.4)
EOSINOPHILS RELATIVE PERCENT: 4.9 %
EPITHELIAL CELLS, UA: ABNORMAL /HPF (ref 0–5)
GFR AFRICAN AMERICAN: >59
GFR NON-AFRICAN AMERICAN: >59
GLOBULIN: 2.9 G/DL
GLUCOSE BLD-MCNC: 95 MG/DL (ref 74–106)
GLUCOSE URINE: NEGATIVE MG/DL
HCT VFR BLD CALC: 43.6 % (ref 40–54)
HEMOGLOBIN: 14.8 G/DL (ref 13–18)
IMMATURE GRANULOCYTES #: 0 K/UL
IMMATURE GRANULOCYTES %: 0.2 % (ref 0–5)
KETONES, URINE: NEGATIVE MG/DL
LEUKOCYTE ESTERASE, URINE: NEGATIVE
LYMPHOCYTES ABSOLUTE: 2.4 K/UL (ref 1.5–4)
LYMPHOCYTES RELATIVE PERCENT: 28.4 %
Lab: NORMAL
MAGNESIUM: 2 MG/DL (ref 1.7–2.4)
MCH RBC QN AUTO: 29.9 PG (ref 27–32)
MCHC RBC AUTO-ENTMCNC: 33.9 G/DL (ref 31–35)
MCV RBC AUTO: 88.1 FL (ref 80–100)
METHADONE SCREEN, URINE: NORMAL
METHAMPHETAMINE, URINE: NORMAL
MICROSCOPIC EXAMINATION: YES
MONOCYTES ABSOLUTE: 0.7 K/UL (ref 0.2–0.8)
MONOCYTES RELATIVE PERCENT: 8 %
MUCUS: ABNORMAL /LPF
NEUTROPHILS ABSOLUTE: 4.9 K/UL (ref 2–7.5)
NEUTROPHILS RELATIVE PERCENT: 57.7 %
NITRITE, URINE: NEGATIVE
OPIATE SCREEN URINE: NORMAL
PDW BLD-RTO: 12.5 % (ref 11–16)
PH UA: 5 (ref 5–8)
PHENCYCLIDINE SCREEN URINE: NORMAL
PLATELET # BLD: 248 K/UL (ref 150–400)
PMV BLD AUTO: 10.1 FL (ref 6–10)
POTASSIUM SERPL-SCNC: 4.3 MMOL/L (ref 3.4–5.1)
PROPOXYPHENE SCREEN, URINE: NORMAL
PROTEIN UA: NEGATIVE MG/DL
RBC # BLD: 4.95 M/UL (ref 4.5–6)
RBC UA: ABNORMAL /HPF (ref 0–4)
SODIUM BLD-SCNC: 139 MMOL/L (ref 136–145)
SPECIFIC GRAVITY UA: 1.01 (ref 1–1.03)
TOTAL PROTEIN: 7.7 G/DL (ref 6.4–8.3)
TRICYCLIC, URINE: NORMAL
UR OXYCODONE RAPID SCREEN: NORMAL
URINE REFLEX TO CULTURE: ABNORMAL
URINE TYPE: ABNORMAL
UROBILINOGEN, URINE: 0.2 E.U./DL
WBC # BLD: 8.6 K/UL (ref 4–11)
WBC UA: ABNORMAL /HPF (ref 0–5)

## 2022-09-15 PROCEDURE — 80307 DRUG TEST PRSMV CHEM ANLYZR: CPT

## 2022-09-15 PROCEDURE — 93005 ELECTROCARDIOGRAM TRACING: CPT

## 2022-09-15 PROCEDURE — 85025 COMPLETE CBC W/AUTO DIFF WBC: CPT

## 2022-09-15 PROCEDURE — 80053 COMPREHEN METABOLIC PANEL: CPT

## 2022-09-15 PROCEDURE — 36415 COLL VENOUS BLD VENIPUNCTURE: CPT

## 2022-09-15 PROCEDURE — 70450 CT HEAD/BRAIN W/O DYE: CPT

## 2022-09-15 PROCEDURE — 81001 URINALYSIS AUTO W/SCOPE: CPT

## 2022-09-15 PROCEDURE — 83735 ASSAY OF MAGNESIUM: CPT

## 2022-09-15 PROCEDURE — 99284 EMERGENCY DEPT VISIT MOD MDM: CPT

## 2022-09-15 ASSESSMENT — PAIN - FUNCTIONAL ASSESSMENT: PAIN_FUNCTIONAL_ASSESSMENT: NONE - DENIES PAIN

## 2022-09-15 NOTE — ED PROVIDER NOTES
Hiram Magdaleno [facility] ENCOUNTER      Pt Name: Ramon Izaguirre  MRN: [id number]  YOB: 1970  Date of evaluation: 9/15/2022  Provider: Patience Dykes MD    CHIEF COMPLAINT       Chief Complaint   Patient presents with    Seizures         HISTORY OF PRESENT ILLNESS  (Location/Symptom, Timing/Onset, Context/Setting, Quality, Duration, Modifying Factors, Severity.)   Ramon Izaguirre is a 45 y.o. male who presents to the emergency department called for patient having seizure activity  Patient remembers he was getting his kids ready to get on the bus he felt his heart fluttering like its been doing for a month or more he went to get a drink of water and the next thing he knew paramedics were there talking to him this apparently was about a 40-minute time span between bystanders including a bystander paramedic reported the patient having seizure activity at the beginning of that time which lasted approximately 30 minutes after the seizure activity stopped he was apparently at postictal for another for 5 minutes he has no history of seizures he states that he now feels fine. He apparently has been seen several times in the last month or 2 for this fluttery heart rate at 1 point time he says he was placed on an anticoagulant although he does not know what he is also recently been placed on cholesterol medicine      Nursing notes were reviewed.     REVIEW OFSYSTEMS    (2-9 systems for level 4, 10 or more for level 5)   ROS:  General:  No fevers, no chills, no weakness  Cardiovascular:  No chest pain, no palpitations  Respiratory:  No shortness of breath, no cough, no wheezing  Gastrointestinal:  No pain, no nausea, no vomiting, no diarrhea  Musculoskeletal:  No muscle pain, no joint pain  Skin:  No rash, no easy bruising  Neurologic:  No speech problems, no headache, no extremity weakness  Psychiatric:  No anxiety  Genitourinary:  No dysuria, no hematuria    Except as noted above the remainder of the review of systems was reviewed and negative. PAST MEDICAL HISTORY     Past Medical History:   Diagnosis Date    Chronic back pain     GERD (gastroesophageal reflux disease)     Headache     Hypertension          SURGICAL HISTORY     History reviewed. No pertinent surgical history. CURRENT MEDICATIONS       Previous Medications    LISINOPRIL (PRINIVIL;ZESTRIL) 5 MG TABLET    Take 1 tablet by mouth in the morning. Needs appt for future refills.     PANTOPRAZOLE (PROTONIX) 40 MG TABLET    Take 1 tablet by mouth every morning (before breakfast)    SIMETHICONE (MYLICON) 80 MG CHEWABLE TABLET    Take 1 tablet by mouth every 6 hours as needed (gas pain, abdominal bloating)       ALLERGIES     Shrimp (diagnostic) and Penicillins    FAMILY HISTORY       Family History   Problem Relation Age of Onset    Cancer Mother         LUNG    Cancer Father         LUNG          SOCIAL HISTORY       Social History     Socioeconomic History    Marital status: Unknown     Spouse name: None    Number of children: None    Years of education: None    Highest education level: None   Tobacco Use    Smoking status: Former     Packs/day: 0.50     Years: 40.00     Pack years: 20.00     Types: Cigarettes     Quit date: 4/16/2019     Years since quitting: 3.4    Smokeless tobacco: Current     Types: Chew   Substance and Sexual Activity    Alcohol use: No    Drug use: No     Social Determinants of Health     Financial Resource Strain: Low Risk     Difficulty of Paying Living Expenses: Not hard at all   Food Insecurity: No Food Insecurity    Worried About Running Out of Food in the Last Year: Never true    Ran Out of Food in the Last Year: Never true         PHYSICAL EXAM    (up to 7 for level 4, 8 or more for level 5)     ED Triage Vitals [09/15/22 0937]   BP Temp Temp src Heart Rate Resp SpO2 Height Weight   (!) 123/90 98.5 °F (36.9 °C) -- 57 16 95 % -- --       Physical Exam  General :Patient is awake, alert, oriented, in no acute distress, nontoxic appearing  HEENT: Pupils are equally round and reactive to light, EOMI, conjunctivae clear. Oral mucosa is moist, no exudate. Uvula is midline  Neck: Neck is supple, full range of motion, trachea midline  Cardiac: Heart regular rate, rhythm, no murmurs, rubs, or gallops  Lungs: Lungs are clear to auscultation, there is no wheezing, rhonchi, or rales. There is no use of accessory muscles. Chest wall: There is no tenderness to palpation over the chest wall or over ribs  Abdomen: Abdomen is soft, nontender, nondistended. There is no firm or pulsatile masses, no rebound rigidity or guarding. Musculoskeletal: 5 out of 5 strength in all 4 extremities. No focal muscle deficits are appreciated  Neuro: Motor intact, sensory intact, level of consciousness is normal, Dermatology: Skin is warm and dry  Psych: Mentation is grossly normal, cognition is grossly normal. Affect is appropriate. DIAGNOSTIC RESULTS     EKG: All EKG's are interpreted by the Emergency Department Physician who either signs or Co-signs this chart in the [address] a cardiologist.    The EKG interpreted by me shows sinus rhythm rate of 59 normal EKG    RADIOLOGY:   Non-plain film images such as CT, Ultrasound and MRI are read by the radiologist. Plain radiographic images are visualized and preliminarily interpreted by the emergency physician with the below findings:      [] Radiologist's Report Reviewed:  CT HEAD [address] (Select for new onset seizures or head trauma)   Final Result      No acute intracranial hemorrhage or mass effect.                ED BEDSIDE ULTRASOUND:   Performed by ED Physician - none    LABS:    I have reviewed and interpreted all of the currently available lab results from this visit (ifapplicable):  Results for orders placed or performed during the hospital encounter of 09/15/22   CBC with Auto Differential   Result Value Ref Range    WBC 8.6 4.0 - 11.0 K/uL    RBC 4.95 4.50 - 6.00 M/uL    Hemoglobin 14.8 13.0 - 18.0 g/dL    Hematocrit 43.6 40.0 - 54.0 %    MCV 88.1 80.0 - 100.0 fL    MCH 29.9 27.0 - 32.0 pg    MCHC 33.9 31.0 - 35.0 g/dL    RDW 12.5 11.0 - 16.0 %    Platelets 019 444 - 681 K/uL    MPV 10.1 (H) 6.0 - 10.0 fL    Neutrophils % 57.7 %    Immature Granulocytes % 0.2 0.0 - 5.0 %    Lymphocytes % 28.4 %    Monocytes % 8.0 %    Eosinophils % 4.9 %    Basophils % 0.8 %    Neutrophils Absolute 4.9 2.0 - 7.5 K/uL    Immature Granulocytes # 0.0 K/uL    Lymphocytes Absolute 2.4 1.5 - 4.0 K/uL    Monocytes Absolute 0.7 0.2 - 0.8 K/uL    Eosinophils Absolute 0.4 0.0 - 0.4 K/uL    Basophils Absolute 0.1 0.0 - 0.1 K/uL   CMP   Result Value Ref Range    Sodium 139 136 - 145 mmol/L    Potassium 4.3 3.4 - 5.1 mmol/L    Chloride 102 98 - 107 mmol/L    CO2 26 20 - 30 mmol/L    Anion Gap 11 3 - 16    Glucose 95 74 - 106 mg/dL    BUN 10 6 - 20 mg/dL    Creatinine 1.0 0.4 - 1.2 mg/dL    GFR Non-African American >59 >59    GFR African American >59 >59    Calcium 10.0 8.5 - 10.5 mg/dL    Total Protein 7.7 6.4 - 8.3 g/dL    Albumin 4.8 3.4 - 4.8 g/dL    Albumin/Globulin Ratio 1.7 0.8 - 2.0    Total Bilirubin 0.6 0.3 - 1.2 mg/dL    Alkaline Phosphatase 51 25 - 100 U/L    ALT 26 4 - 36 U/L    AST 18 8 - 33 U/L    Globulin 2.9 Not Established g/dL   Magnesium   Result Value Ref Range    Magnesium 2.0 1.7 - 2.4 mg/dL   Urinalysis with Reflex to Culture    Specimen: Urine   Result Value Ref Range    Color, UA Yellow Straw/Yellow    Clarity, UA Clear Clear    Glucose, Ur Negative Negative mg/dL    Bilirubin Urine Negative Negative    Ketones, Urine Negative Negative mg/dL    Specific Gravity, UA 1.010 1.005 - 1.030    Blood, Urine TRACE-LYSED (A) Negative    pH, UA 5.0 5.0 - 8.0    Protein, UA Negative Negative mg/dL    Urobilinogen, Urine 0.2 <2.0 E.U./dL    Nitrite, Urine Negative Negative    Leukocyte Esterase, Urine Negative Negative    Microscopic Examination YES     Urine Type NotGiven Urine Reflex to Culture Not Indicated    Drug Screen, Multiple, Urine   Result Value Ref Range    PCP Screen, Urine Neg Negative <25 ng/mL    Benzodiazepine Screen, Urine Neg Negative <300 ng/mL    Cocaine Metabolite Screen, Urine Neg Negative <300 ng/mL    Amphetamine Screen, Urine Neg Negative <1000 ng/mL    Cannabinoid Scrn, Ur Neg Negative <50 ng/mL    Opiate Scrn, Ur Neg Negative <300 ng/mL    Barbiturate Screen, Ur Neg Negative <022 ng/mL    Tricyclic Neg Negative <280 ng/mL    Methadone Screen, Urine Neg Negative <300 ng/ml    Propoxyphene Screen, Urine Neg Negative <300 ng/mL    Methamphetamine, Urine Neg Negative <1000 ng/mL    UR Oxycodone Rapid Screen Neg Negative <100 ng/mL    Buprenorphine Qual, Urine Neg Negative <25 ng/mL    Drug Screen Comment: see below    Microscopic Urinalysis   Result Value Ref Range    Mucus, UA 1+ (A) None Seen /LPF    WBC, UA 0-2 0 - 5 /HPF    RBC, UA 0-2 0 - 4 /HPF    Epithelial Cells, UA 2-5 0 - 5 /HPF        All other labs were within normal range or not returned as of this dictation. EMERGENCY DEPARTMENT COURSE and DIFFERENTIAL DIAGNOSIS/MDM:   Vitals:    Vitals:    09/15/22 1158 09/15/22 1159 09/15/22 1200 09/15/22 1215   BP:   122/82 112/78   Pulse: 51 53 57 72   Resp: 18 16 21 25   Temp:       SpO2:    99%       MEDICATIONS ADMINISTERED IN ED:  Medications   levETIRAcetam (KEPPRA) 1,500 mg in sodium chloride 0.9 % 100 mL IVPB (has no administration in time range)       Has been stable no evidence of recurrent seizure activity. His blood work urine EKG and head CT are all within normal limits including a normal bicarb of 26. I spoken to DR RODGERS [facility] neurologist at [facility] who has suggested we load the patient with 1500 mg of Keppra followed by 500 mg twice daily until he can be seen at the [facility] neurology clinic in the next 2 to 3 weeks for MRI and EEG. This was discussed with the patient he is agreeable. Will discharge to home once he has had his loading dose.   12:15 patient is refusing to be started on Keppra. I discussed with him the risks of having further seizures if we do not put him on a seizure medication and that this 1 in particular has very few side effects that he is in more danger of harm by having further seizures than what he would experience with the medication until he can be further evaluated. He states that he understands the risk of further seizures and declines the medication. At this point I will discharge him to follow-up with [facility] neurology. The patient will follow-up with their PCP in 1-2 days for reevaluation. If the patient or family members have anyfurther concerns or any worsening symptoms they will return to the ED for reevaluation. Is this patient to be included in the SEP-1 Core Measure due to severe sepsis or septic shock? No   Exclusion criteria - the patient is NOT to be included for SEP-1 Core Measure due to: Alternative explanation for abnormal labs/vitals that do not relate to sepsis, see MDM for further explanation          CONSULTS:  None    PROCEDURES:  Procedures    CRITICAL CARE TIME    Total Critical Care time was 0 minutes, excluding separately reportable procedures. There was a high probability of clinically significant/life threatening deterioration in the patient's condition which required my urgent intervention. FINAL IMPRESSION      1. Seizure [facility]) New Problem         DISPOSITION/PLAN   DISPOSITION Decision To Discharge 09/15/2022 12:17:09 PM  Stable discharge to home    PATIENT REFERRED TO:  [facility] neurology      They will call with appointment    DISCHARGE MEDICATIONS:  New Prescriptions    No medications on file       Comment: Please note this report has been produced using speech recognition software and may contain errorsrelated to that system including errors in grammar, punctuation, and spelling, as well as words and phrases that may be inappropriate.  If there are any questions or concerns please feel free to contact the dictating providerfor clarification.     Sarai Milligan MD  Attending Emergency Physician               Sarai Milligan MD  09/15/22 1186

## 2022-09-15 NOTE — ED NOTES
Pt refused Keppra, and verbalizes understanding that without the medication he could have more seizures and possibly die.      Tariq Flowers RN  09/15/22 1268

## 2022-09-15 NOTE — ED NOTES
Pt verbalizes understanding of discharge instructions and follow up.       Tariq Flowers RN  09/15/22 3685

## 2022-09-16 ENCOUNTER — OFFICE VISIT (OUTPATIENT)
Dept: CARDIOLOGY | Facility: CLINIC | Age: 52
End: 2022-09-16

## 2022-09-16 VITALS
BODY MASS INDEX: 33.04 KG/M2 | HEART RATE: 61 BPM | SYSTOLIC BLOOD PRESSURE: 120 MMHG | OXYGEN SATURATION: 99 % | WEIGHT: 218 LBS | HEIGHT: 68 IN | DIASTOLIC BLOOD PRESSURE: 80 MMHG

## 2022-09-16 DIAGNOSIS — I10 PRIMARY HYPERTENSION: ICD-10-CM

## 2022-09-16 DIAGNOSIS — R07.2 PRECORDIAL PAIN: Primary | ICD-10-CM

## 2022-09-16 DIAGNOSIS — E78.2 MIXED HYPERLIPIDEMIA: ICD-10-CM

## 2022-09-16 DIAGNOSIS — R55 SYNCOPE AND COLLAPSE: ICD-10-CM

## 2022-09-16 PROCEDURE — 99204 OFFICE O/P NEW MOD 45 MIN: CPT | Performed by: NURSE PRACTITIONER

## 2022-09-16 RX ORDER — ATORVASTATIN CALCIUM 80 MG/1
TABLET, FILM COATED ORAL
COMMUNITY
Start: 2022-07-26

## 2022-09-16 RX ORDER — LISINOPRIL 2.5 MG/1
TABLET ORAL
COMMUNITY
Start: 2022-07-08

## 2022-09-19 ENCOUNTER — TELEPHONE (OUTPATIENT)
Dept: CARDIOLOGY | Facility: CLINIC | Age: 52
End: 2022-09-19

## 2022-09-19 NOTE — TELEPHONE ENCOUNTER
Caller: TAMARA GALDAMEZ    Relationship: Emergency Contact    Best call back number: [phone]     What is the best time to reach you: ANY    Who are you requesting to speak with (clinical staff, provider,  specific staff member): OFFICE STAFF    Do you know the name of the person who called: TAMARA    What was the call regarding: PT HAS MOBILE MONITOR AND IS HAVING TO CHARGE EVERYDAY. NEEDING MORE PATCHES TO SECURE MONITOR-PT HAS 2 LEFT-PLEASE CALL PT AND LET THEM KNOW    Do you require a callback: YES

## 2022-09-28 ENCOUNTER — HOSPITAL ENCOUNTER (OUTPATIENT)
Dept: NUCLEAR MEDICINE | Facility: HOSPITAL | Age: 52
End: 2022-09-28

## 2022-09-28 ENCOUNTER — APPOINTMENT (OUTPATIENT)
Dept: NUCLEAR MEDICINE | Facility: HOSPITAL | Age: 52
End: 2022-09-28

## 2022-10-11 ENCOUNTER — HOSPITAL ENCOUNTER (OUTPATIENT)
Dept: NUCLEAR MEDICINE | Facility: HOSPITAL | Age: 52
Discharge: HOME | End: 2022-10-11

## 2022-10-11 DIAGNOSIS — R07.2 PRECORDIAL PAIN: ICD-10-CM

## 2022-10-11 PROCEDURE — 93017 CV STRESS TEST TRACING ONLY: CPT

## 2022-10-11 PROCEDURE — 93018 CV STRESS TEST I&R ONLY: CPT | Performed by: INTERNAL MEDICINE

## 2022-10-11 PROCEDURE — 25010000002 REGADENOSON 0.4 MG/5ML SOLUTION: Performed by: NURSE PRACTITIONER

## 2022-10-11 PROCEDURE — A9500 TC99M SESTAMIBI: HCPCS | Performed by: NURSE PRACTITIONER

## 2022-10-11 PROCEDURE — 78452 HT MUSCLE IMAGE SPECT MULT: CPT | Performed by: INTERNAL MEDICINE

## 2022-10-11 PROCEDURE — 0 TECHNETIUM SESTAMIBI: Performed by: NURSE PRACTITIONER

## 2022-10-11 PROCEDURE — 78452 HT MUSCLE IMAGE SPECT MULT: CPT

## 2022-10-11 RX ADMIN — REGADENOSON 0.4 MG: 0.08 INJECTION, SOLUTION INTRAVENOUS at 08:39

## 2022-10-11 RX ADMIN — TECHNETIUM TC 99M SESTAMIBI 1 DOSE: 1 INJECTION INTRAVENOUS at 08:39

## 2022-10-11 RX ADMIN — TECHNETIUM TC 99M SESTAMIBI 1 DOSE: 1 INJECTION INTRAVENOUS at 06:55

## 2022-10-12 LAB
BH CV REST NUCLEAR ISOTOPE DOSE: 9.8 MCI
BH CV STRESS COMMENTS STAGE 1: NORMAL
BH CV STRESS DOSE REGADENOSON STAGE 1: 0.4
BH CV STRESS DURATION MIN STAGE 1: 0
BH CV STRESS DURATION SEC STAGE 1: 10
BH CV STRESS NUCLEAR ISOTOPE DOSE: 35 MCI
BH CV STRESS PROTOCOL 1: NORMAL
BH CV STRESS RECOVERY BP: NORMAL MMHG
BH CV STRESS RECOVERY HR: 83 BPM
BH CV STRESS STAGE 1: 1
LV EF NUC BP: 60 %
MAXIMAL PREDICTED HEART RATE: 166 BPM
PERCENT MAX PREDICTED HR: 61.45 %
STRESS BASELINE BP: NORMAL MMHG
STRESS BASELINE HR: 61 BPM
STRESS PERCENT HR: 72 %
STRESS POST PEAK BP: NORMAL MMHG
STRESS POST PEAK HR: 102 BPM
STRESS TARGET HR: 141 BPM

## 2022-10-31 ENCOUNTER — TELEPHONE (OUTPATIENT)
Dept: CARDIOLOGY | Facility: CLINIC | Age: 52
End: 2022-10-31

## 2022-10-31 NOTE — TELEPHONE ENCOUNTER
Caller: TAMARA GALDAMEZ    Relationship to patient: Significant Other    Best call back number: [phone]    Patient is needing: PT SAID FLO FROM CARDIOLOGY CALLED - NO TE IN CHART - UNABLE TO WARM TRANSFER

## 2023-02-06 ENCOUNTER — APPOINTMENT (OUTPATIENT)
Dept: CT IMAGING | Facility: HOSPITAL | Age: 53
End: 2023-02-06
Payer: COMMERCIAL

## 2023-02-06 ENCOUNTER — HOSPITAL ENCOUNTER (EMERGENCY)
Facility: HOSPITAL | Age: 53
Discharge: LEFT BEFORE DISCHARGE | End: 2023-02-06
Attending: EMERGENCY MEDICINE | Admitting: EMERGENCY MEDICINE
Payer: COMMERCIAL

## 2023-02-06 VITALS
BODY MASS INDEX: 33.04 KG/M2 | SYSTOLIC BLOOD PRESSURE: 123 MMHG | WEIGHT: 218 LBS | RESPIRATION RATE: 14 BRPM | HEIGHT: 68 IN | TEMPERATURE: 98.2 F | HEART RATE: 63 BPM | DIASTOLIC BLOOD PRESSURE: 75 MMHG | OXYGEN SATURATION: 99 %

## 2023-02-06 DIAGNOSIS — R10.30 LOWER ABDOMINAL PAIN: Primary | ICD-10-CM

## 2023-02-06 PROCEDURE — 99281 EMR DPT VST MAYX REQ PHY/QHP: CPT

## 2023-02-06 RX ORDER — SODIUM CHLORIDE 0.9 % (FLUSH) 0.9 %
10 SYRINGE (ML) INJECTION AS NEEDED
Status: DISCONTINUED | OUTPATIENT
Start: 2023-02-06 | End: 2023-02-06 | Stop reason: HOSPADM

## 2023-02-06 RX ORDER — KETOROLAC TROMETHAMINE 30 MG/ML
15 INJECTION, SOLUTION INTRAMUSCULAR; INTRAVENOUS ONCE
Status: DISCONTINUED | OUTPATIENT
Start: 2023-02-06 | End: 2023-02-06 | Stop reason: HOSPADM

## 2023-02-06 NOTE — ED PROVIDER NOTES
Subjective   History of Present Illness  Chief Complaint: Lower abdominal pain  History of Present Illness: This is a 52-year-old male no pertinent past medical history presents today with 5 days of bilateral lower abdominal pain, without nausea vomiting or diarrhea, denies any constipation.  Denies any fevers.  Onset: 5 days  Duration: Improving  Exacerbating / Alleviating factors: None  Associated symptoms: None    Nurses Notes reviewed and agree, including vitals, allergies, social history and prior medical history.    REVIEW OF SYSTEMS: All systems reviewed and not pertinent unless noted.    Positive for: Lower abdominal pain    Negative for: All other review of systems reviewed and negative the specified  Review of Systems    Past Medical History:   Diagnosis Date   • Acne vulgaris    • Chronic back pain    • GERD (gastroesophageal reflux disease)    • PTSD (post-traumatic stress disorder)        Allergies   Allergen Reactions   • Shrimp Anaphylaxis   • Penicillins        History reviewed. No pertinent surgical history.    History reviewed. No pertinent family history.    Social History     Socioeconomic History   • Marital status:    Tobacco Use   • Smoking status: Former     Types: Cigarettes   • Smokeless tobacco: Current     Types: Snuff   Vaping Use   • Vaping Use: Never used   Substance and Sexual Activity   • Alcohol use: No   • Drug use: Never   • Sexual activity: Defer           Objective   Physical Exam  CONSTITUTIONAL: Well developed, well nourished male,  in no acute distress.  VITAL SIGNS: Per nursing, reviewed and noted  SKIN: Exposed skin with no rashes, ulcerations or petechiae.  EYES: PERRLA. EOMI.  ENT: Normal voice.  Patient maintained wearing a mask throughout patient encounter due to coronavirus pandemic  RESPIRATORY:  No increased work of breathing. No retractions.  CARDIOVASCULAR:  Regular rate and rhythm, no murmurs.  Good Peripheral pulses. Good cap refill to extremities.  GI:  Patient has some mild lower abdominal pain, but no peritoneal signs, bowel sounds are adequate in all quadrants.  MUSCULOSKELETAL:  No tenderness. Full ROM. Strength and tone grossly normal.  no spasms. no neck or back tenderness or spasm.  NEUROLOGIC: Alert, oriented x 3. No gross deficits. GCS 15.  PSYCH: Appropriate affect.  : No bladder tenderness or distention, no CVA tenderness.    Procedures           ED Course                                           Medical Decision Making  Initial impression of presenting illness: 52-year-old male presents with lower abdominal pain for 5 days without any nausea vomiting or diarrhea no fevers.    DDX includes but is not limited to gastroenteritis, urinary tract infection, ischemic bowel, diverticulitis    Patient arrives hemodynamically stable, afebrile with vitals interpreted by me. Pertinent features from physical exam: Mild lower abdominal cramping, but no peritoneal signs, physical exam is otherwise grossly unremarkable..    Initial diagnostic plan: Abdominal pain laboratory work-up, CT scan of the abdomen and pelvis, IV fluids and pain control.    Disposition plan: The paramedic went to start IV, obtain lab work, the patient really refused any needle sticks or CT scans, says he just wanted me to tell him what his abdominal pain was from.  I had an extensive conversation with this patient telling him that I would like to obtain lab work and a CT scan to further evaluate why he is having this abdominal pain, says he refuses to do any lab work or CT scan, he only came in because he wanted me to tell him what was wrong with his abdomen.  He was made aware of the risks of leaving has advised him to stay, his abdominal pain could be serious, as it could be ischemic bowel, perforated abdomen, appendicitis which if the left would lead to death if untreated, he is aware of the risks and still wishes to sign out AGAINST MEDICAL ADVICE.    Amount and/or Complexity of Data  Reviewed  Labs: ordered.  Radiology: ordered.      Risk  Prescription drug management.          Final diagnoses:   Lower abdominal pain       ED Disposition  ED Disposition     ED Disposition   AMA    Condition   --    Comment   --             [facility]  [address] [place] [id number] [phone]  Go to   As needed, If symptoms worsen         Medication List      No changes were made to your prescriptions during this visit.          Foster Zavala, PA  02/06/23 1104

## 2023-02-10 ENCOUNTER — HOSPITAL ENCOUNTER (EMERGENCY)
Facility: HOSPITAL | Age: 53
Discharge: HOME | End: 2023-02-10
Attending: HOSPITALIST
Payer: MEDICAID

## 2023-02-10 ENCOUNTER — APPOINTMENT (OUTPATIENT)
Dept: CT IMAGING | Facility: HOSPITAL | Age: 53
End: 2023-02-10
Payer: MEDICAID

## 2023-02-10 VITALS
SYSTOLIC BLOOD PRESSURE: 130 MMHG | WEIGHT: 230 LBS | DIASTOLIC BLOOD PRESSURE: 91 MMHG | OXYGEN SATURATION: 99 % | TEMPERATURE: 98.7 F | BODY MASS INDEX: 34.86 KG/M2 | RESPIRATION RATE: 18 BRPM | HEIGHT: 68 IN | HEART RATE: 65 BPM

## 2023-02-10 DIAGNOSIS — R10.30 LOWER ABDOMINAL PAIN: Primary | ICD-10-CM

## 2023-02-10 LAB
A/G RATIO: 1.6 (ref 0.8–2)
ALBUMIN SERPL-MCNC: 4.6 G/DL (ref 3.4–4.8)
ALP BLD-CCNC: 46 U/L (ref 25–100)
ALT SERPL-CCNC: 26 U/L (ref 4–36)
ANION GAP SERPL CALCULATED.3IONS-SCNC: 9 MMOL/L (ref 3–16)
AST SERPL-CCNC: 17 U/L (ref 8–33)
BASOPHILS ABSOLUTE: 0.1 K/UL (ref 0–0.1)
BASOPHILS RELATIVE PERCENT: 0.7 %
BILIRUB SERPL-MCNC: 0.3 MG/DL (ref 0.3–1.2)
BILIRUBIN URINE: NEGATIVE
BLOOD, URINE: NEGATIVE
BUN BLDV-MCNC: 11 MG/DL (ref 6–20)
CALCIUM SERPL-MCNC: 10 MG/DL (ref 8.5–10.5)
CHLORIDE BLD-SCNC: 102 MMOL/L (ref 98–107)
CLARITY: CLEAR
CO2: 28 MMOL/L (ref 20–30)
COLOR: YELLOW
CREAT SERPL-MCNC: 0.9 MG/DL (ref 0.4–1.2)
EOSINOPHILS ABSOLUTE: 0.8 K/UL (ref 0–0.4)
EOSINOPHILS RELATIVE PERCENT: 6.9 %
GFR SERPL CREATININE-BSD FRML MDRD: >60 ML/MIN/{1.73_M2}
GLOBULIN: 2.8 G/DL
GLUCOSE BLD-MCNC: 92 MG/DL (ref 74–106)
GLUCOSE URINE: NEGATIVE MG/DL
HCT VFR BLD CALC: 40.8 % (ref 40–54)
HEMOGLOBIN: 14 G/DL (ref 13–18)
IMMATURE GRANULOCYTES #: 0 K/UL
IMMATURE GRANULOCYTES %: 0.3 % (ref 0–5)
KETONES, URINE: NEGATIVE MG/DL
LEUKOCYTE ESTERASE, URINE: NEGATIVE
LIPASE: 28 U/L (ref 5.6–51.3)
LYMPHOCYTES ABSOLUTE: 3.9 K/UL (ref 1.5–4)
LYMPHOCYTES RELATIVE PERCENT: 35.7 %
MCH RBC QN AUTO: 30.4 PG (ref 27–32)
MCHC RBC AUTO-ENTMCNC: 34.3 G/DL (ref 31–35)
MCV RBC AUTO: 88.7 FL (ref 80–100)
MICROSCOPIC EXAMINATION: NORMAL
MONOCYTES ABSOLUTE: 0.7 K/UL (ref 0.2–0.8)
MONOCYTES RELATIVE PERCENT: 6.8 %
NEUTROPHILS ABSOLUTE: 5.4 K/UL (ref 2–7.5)
NEUTROPHILS RELATIVE PERCENT: 49.6 %
NITRITE, URINE: NEGATIVE
PDW BLD-RTO: 12.9 % (ref 11–16)
PH UA: 5.5 (ref 5–8)
PLATELET # BLD: 244 K/UL (ref 150–400)
PMV BLD AUTO: 10.6 FL (ref 6–10)
POTASSIUM REFLEX MAGNESIUM: 4.5 MMOL/L (ref 3.4–5.1)
PROTEIN UA: NEGATIVE MG/DL
RBC # BLD: 4.6 M/UL (ref 4.5–6)
SODIUM BLD-SCNC: 139 MMOL/L (ref 136–145)
SPECIFIC GRAVITY UA: 1.01 (ref 1–1.03)
TOTAL PROTEIN: 7.4 G/DL (ref 6.4–8.3)
URINE REFLEX TO CULTURE: NORMAL
URINE TYPE: NORMAL
UROBILINOGEN, URINE: 0.2 E.U./DL
WBC # BLD: 11 K/UL (ref 4–11)

## 2023-02-10 PROCEDURE — 99284 EMERGENCY DEPT VISIT MOD MDM: CPT

## 2023-02-10 PROCEDURE — 81003 URINALYSIS AUTO W/O SCOPE: CPT

## 2023-02-10 PROCEDURE — 80053 COMPREHEN METABOLIC PANEL: CPT

## 2023-02-10 PROCEDURE — 36415 COLL VENOUS BLD VENIPUNCTURE: CPT

## 2023-02-10 PROCEDURE — 83690 ASSAY OF LIPASE: CPT

## 2023-02-10 PROCEDURE — 85025 COMPLETE CBC W/AUTO DIFF WBC: CPT

## 2023-02-10 PROCEDURE — 74176 CT ABD & PELVIS W/O CONTRAST: CPT

## 2023-02-10 ASSESSMENT — PAIN DESCRIPTION - LOCATION: LOCATION: ABDOMEN

## 2023-02-10 ASSESSMENT — PAIN SCALES - GENERAL: PAINLEVEL_OUTOF10: 6

## 2023-02-10 ASSESSMENT — LIFESTYLE VARIABLES
HOW OFTEN DO YOU HAVE A DRINK CONTAINING ALCOHOL: NEVER
HOW MANY STANDARD DRINKS CONTAINING ALCOHOL DO YOU HAVE ON A TYPICAL DAY: PATIENT DOES NOT DRINK

## 2023-02-10 ASSESSMENT — PAIN DESCRIPTION - ORIENTATION: ORIENTATION: LOWER;MID

## 2023-02-10 ASSESSMENT — PAIN DESCRIPTION - DESCRIPTORS: DESCRIPTORS: TEARING

## 2023-02-10 NOTE — ED TRIAGE NOTES
Pt arrives to ED with c/o abdominal pain between his belt line and umbilicus. Pt says the pain is pulling in nature and rates the pain a 6/10 on pain scale. Pt says the pain is on-going for the past week. Pt denies injury. Pt also denies nausea, vomiting, and/or diarrhea.

## 2023-02-10 NOTE — ED NOTES
IV attempted RAC, unsuccessful. Will attempt again after physician evaluation.       Jennifer Chanel, RN  02/10/23 2064

## 2023-02-10 NOTE — ED PROVIDER NOTES
[address] ENCOUNTER      Pt Name: Dandre Yusuf  MRN: [id number]  YOB: 1970  Date of evaluation: 2/10/2023  Provider: Yessi Watson Dr       Chief Complaint   Patient presents with    Abdominal Pain         HISTORY OF PRESENT ILLNESS  (Location/Symptom, Timing/Onset, Context/Setting, Quality, Duration, Modifying Factors, Severity.)   Dandre Yusuf is a 45 y.o. male who presents to the emergency department for abdominal pain. Patient states he has been having lower abdominal pain for the past 7 days. He states some days it is worse than others. He states right now his pain is about a 2 or 3 out of 10 describes as a dull achy sensation. He denies anything that he is done that is made it better or worse. He denies any trauma or injury to the area. Denies any pulling tugging lifting type injury. Patient denies any nausea vomiting or diarrhea. Denies any constipation. States he chronically has heartburn and he was on medication for this at one time but he states that his primary doctor at the [facility] took him off of that. He denies any dysuria or change urinary frequency. Denies any hematuria. Denies any flank pain. Denies any fevers or chills. Denies any headaches at the ordinary. Denies any sore throat or earache. Denies any cough. Denies any chest pain or shortness of breath. Denies any body aches other ordinary. Denies any numbness tingling or weakness of extremities. Denies any sick contacts. Patient states he is vaccinated for COVID with 4 injections but is not vaccinated for influenza. Patient apparently was seen at [facility] on 2/6/2023 which was 4 days ago. He been having abdominal pain for 5 days during that time from the electronic record that were reviewing.   Patient apparently refused to have any blood work or CT scan performed at that time he just wanted the doctor to tell him what was causing abdominal discomfort unfortunately they were not able to give him an exact diagnosis and the patient signed out [address] He is now here today for evaluation but he is willing to let us draw blood and he is also willing to let us do a CT scan of the abdomen pelvis. Patient's past medical history pertinent for chronic back pain, GERD, headache and hypertension. Nursing notes were reviewed. REVIEW OFSYSTEMS    (2-9 systems for level 4, 10 or more for level 5)   ROS:  General:  No fevers, no chills, no weakness  Cardiovascular:  No chest pain, no palpitations  Respiratory:  No shortness of breath, no cough, no wheezing  Gastrointestinal:  + pain-lower (achy), no nausea, no vomiting, no diarrhea  Musculoskeletal:  No muscle pain, no joint pain  Skin:  No rash, no easy bruising  Neurologic:  No speech problems, no headache, no extremity weakness  Psychiatric:  No anxiety  Genitourinary:  No dysuria, no hematuria    Except as noted above the remainder of the review of systems was reviewed and negative. PAST MEDICAL HISTORY     Past Medical History:   Diagnosis Date    Chronic back pain     GERD (gastroesophageal reflux disease)     Headache     Hypertension          SURGICAL HISTORY     No past surgical history on file. CURRENT MEDICATIONS       Previous Medications    ATORVASTATIN CALCIUM PO    Take by mouth    LISINOPRIL (PRINIVIL;ZESTRIL) 5 MG TABLET    Take 1 tablet by mouth in the morning. Needs appt for future refills.        ALLERGIES     Shrimp (diagnostic) and Penicillins    FAMILY HISTORY       Family History   Problem Relation Age of Onset    Cancer Mother         LUNG    Cancer Father         LUNG          SOCIAL HISTORY       Social History     Socioeconomic History    Marital status: Unknown   Tobacco Use    Smoking status: Former     Packs/day: 0.50     Years: 40.00     Pack years: 20.00     Types: Cigarettes     Quit date: 4/16/2019     Years since quitting: 3.8    Smokeless tobacco: Current     Types: Chew   Substance and Sexual Activity    Alcohol use: No    Drug use: No         PHYSICAL EXAM    (up to 7 for level 4, 8 or more for level 5)     ED Triage Vitals [02/10/23 1802]   BP Temp Temp Source Pulse Resp SpO2 Height Weight   -- 98.7 °F (37.1 °C) Oral -- -- -- -- --       Physical Exam  General :Patient is awake, alert, oriented, in no acute distress, nontoxic appearing  HEENT: Pupils are equally round and reactive to light, EOMI, conjunctivae clear. Oral mucosa is moist, no exudate. Uvula is midline  Cardiac: Heart regular rate, rhythm, no murmurs, rubs, or gallops  Lungs: Lungs are clear to auscultation, there is no wheezing, rhonchi, or rales. There is no use of accessory muscles. Abdomen: Abdomen is soft, nontender, nondistended. There is no firm or pulsatile masses, no rebound rigidity or guarding. Positive bowel sounds all 4 quadrants. Musculoskeletal: No focal muscle deficits are appreciated  Neuro: Motor intact, sensory intact, level of consciousness is normal  Dermatology: Skin is warm and dry  Psych: Mentation is grossly normal, cognition is grossly normal. Affect is appropriate. DIAGNOSTIC RESULTS     EKG: All EKG's are interpreted by the Emergency Department Physician who either signs or Co-signs this chart in the [address] a cardiologist.        RADIOLOGY:   Non-plain film images such as CT, Ultrasound and MRI are read by the radiologist. Plain radiographic images are visualized and preliminarily interpreted by the emergency physician with the below findings:      [] Radiologist's Report Reviewed:  CT ABDOMEN PELVIS WO CONTRAST Additional Contrast? None   Final Result      1. No acute intra-abdominopelvic abnormality.               ED BEDSIDE ULTRASOUND:   Performed by ED Physician - none    LABS:    I have reviewed and interpreted all of the currently available lab results from this visit (ifapplicable):  Results for orders placed or performed during the hospital encounter of 02/10/23   CBC with Auto Differential   Result Value Ref Range    WBC 11.0 4.0 - 11.0 K/uL    RBC 4.60 4.50 - 6.00 M/uL    Hemoglobin 14.0 13.0 - 18.0 g/dL    Hematocrit 40.8 40.0 - 54.0 %    MCV 88.7 80.0 - 100.0 fL    MCH 30.4 27.0 - 32.0 pg    MCHC 34.3 31.0 - 35.0 g/dL    RDW 12.9 11.0 - 16.0 %    Platelets 873 413 - 279 K/uL    MPV 10.6 (H) 6.0 - 10.0 fL    Neutrophils % 49.6 %    Immature Granulocytes % 0.3 0.0 - 5.0 %    Lymphocytes % 35.7 %    Monocytes % 6.8 %    Eosinophils % 6.9 %    Basophils % 0.7 %    Neutrophils Absolute 5.4 2.0 - 7.5 K/uL    Immature Granulocytes # 0.0 K/uL    Lymphocytes Absolute 3.9 1.5 - 4.0 K/uL    Monocytes Absolute 0.7 0.2 - 0.8 K/uL    Eosinophils Absolute 0.8 (H) 0.0 - 0.4 K/uL    Basophils Absolute 0.1 0.0 - 0.1 K/uL        All other labs were within normal range or not returned as of this dictation. EMERGENCY DEPARTMENT COURSE and DIFFERENTIAL DIAGNOSIS/MDM:   Vitals:    Vitals:    02/10/23 1758 02/10/23 1802 02/10/23 1807 02/10/23 1815   BP: (!) 130/91      Pulse:    65   Resp:    18   Temp:  98.7 °F (37.1 °C)     TempSrc:  Oral     SpO2: 97%      Weight:   230 lb (104.3 kg)    Height:   5' 8\" (1.727 m)        MEDICATIONS ADMINISTERED IN ED:  Medications - No data to display    After initial evaluation examination I did have conversation with the patient about upcoming plan, treatment and possible disposition which they are agreeable to the consultation. Patient advised he looks very comfortable he looks stable by no means does he look toxic since he is a very hard stick for IV I was agreeable with him not to place an IV that we can just do a straight stick for blood work which he was agreeable to. Advised we also perform CT scan abdomen pelvis to rule out any acute abdominal process such as appendicitis, diverticulitis/diverticulosis, ischemic bowel.   Patient will also have UA performed to make sure there is no concern for urinary tract infection. Patient will also have CBC, CMP and a lipase. Patient's final disposition will be determined once his radiological diagnostic studies been performed reviewed. Patient advised we can give him Tylenol Motrin for pain here but he is declined initially states his pain is only a 2 or 3 out of 10 at this time. Resting comfortably stretcher no acute distress nontoxic-appearing. Blood work showed white count 11,000, hemoglobin 14.0, hematocrit 40.8, platelet counts 829. Remaining portion of the patient's blood work is pending at time of shift change. UA pending at time of shift change. CT scan of the abdomen pelvis without contrast was pending at time of shift change. Patient's radiological diagnostic studies would not be performed or finished prior to shift change. Patient be checked out to the oncoming physician for final disposition. 19:00p.m. I have signed out [facility] Emergency Department care to Dr. Shelia Stringer. We discussed the pertinent history, physical exam, completed/pending test results (if applicable) and current treatment plan. Please refer to his/her chart for the patients remaining Emergency Department course and final disposition. Is this patient to be included in the SEP-1 Core Measure due to severe sepsis or septic shock? No   Exclusion criteria - the patient is NOT to be included for SEP-1 Core Measure due to:  2+ SIRS criteria are not met          CONSULTS:  None    PROCEDURES:  Procedures    CRITICAL CARE TIME    Total Critical Care time was 0 minutes, excluding separately reportable procedures. There was a high probability of clinically significant/life threatening deterioration in the patient's condition which required my urgent intervention. FINAL IMPRESSION      1. Lower abdominal pain          DISPOSITION/PLAN   DISPOSITION        PATIENT REFERRED TO:  No follow-up provider specified.     DISCHARGE MEDICATIONS:  New Prescriptions No medications on file       Comment: Please note this report has been produced using speech recognition software and may contain errorsrelated to that system including errors in grammar, punctuation, and spelling, as well as words and phrases that may be inappropriate. If there are any questions or concerns please feel free to contact the dictating providerfor clarification.     Jessica Han DO  Attending Emergency Physician               Jessica Han DO  02/10/23 0864

## 2023-02-11 NOTE — DISCHARGE INSTRUCTIONS
Please follow-up with Dr. Lamont Camejo for additional outpatient work-up. Call the office on Monday to schedule follow-up appointment. If any new/acute symptoms or worsening of current presentation please go to the closest urgent care or emergency room for reevaluation.

## 2023-02-11 NOTE — ED NOTES
Pt provided with urinal, instructed to provide urine sample. Pt instructed to notify nursing staff when urine sample is obtained.       Jaime Cortes RN  02/10/23 2430

## 2023-02-11 NOTE — ED PROVIDER NOTES
Emergency Department Encounter  Location: [address] [place] [address] Court    Patient: Temo Baez  MRN: [id number]  : 1970  Date of evaluation: 2/10/2023  ED Provider: Nissa Montejo MD    19:00p.m. Temo Baez was checked out to me by Dr Brad Galvin. Please see his/her initial documentation for details of the patient's initial ED presentation, physical exam and completed studies. In brief, Temo Baez is a 45 y.o. male that presented to the emergency department with lower abdominal pain.      I have reviewed and interpreted all of the currently available lab results and diagnostics from this visit:  Results for orders placed or performed during the hospital encounter of 02/10/23   CBC with Auto Differential   Result Value Ref Range    WBC 11.0 4.0 - 11.0 K/uL    RBC 4.60 4.50 - 6.00 M/uL    Hemoglobin 14.0 13.0 - 18.0 g/dL    Hematocrit 40.8 40.0 - 54.0 %    MCV 88.7 80.0 - 100.0 fL    MCH 30.4 27.0 - 32.0 pg    MCHC 34.3 31.0 - 35.0 g/dL    RDW 12.9 11.0 - 16.0 %    Platelets 684 794 - 206 K/uL    MPV 10.6 (H) 6.0 - 10.0 fL    Neutrophils % 49.6 %    Immature Granulocytes % 0.3 0.0 - 5.0 %    Lymphocytes % 35.7 %    Monocytes % 6.8 %    Eosinophils % 6.9 %    Basophils % 0.7 %    Neutrophils Absolute 5.4 2.0 - 7.5 K/uL    Immature Granulocytes # 0.0 K/uL    Lymphocytes Absolute 3.9 1.5 - 4.0 K/uL    Monocytes Absolute 0.7 0.2 - 0.8 K/uL    Eosinophils Absolute 0.8 (H) 0.0 - 0.4 K/uL    Basophils Absolute 0.1 0.0 - 0.1 K/uL   Comprehensive Metabolic Panel w/ Reflex to MG   Result Value Ref Range    Sodium 139 136 - 145 mmol/L    Potassium reflex Magnesium 4.5 3.4 - 5.1 mmol/L    Chloride 102 98 - 107 mmol/L    CO2 28 20 - 30 mmol/L    Anion Gap 9 3 - 16    Glucose 92 74 - 106 mg/dL    BUN 11 6 - 20 mg/dL    Creatinine 0.9 0.4 - 1.2 mg/dL    Est, Glom Filt Rate >60 >59    Calcium 10.0 8.5 - 10.5 mg/dL    Total Protein 7.4 6.4 - 8.3 g/dL    Albumin 4.6 3.4 - 4.8 g/dL    Albumin/Globulin Ratio 1.6 0.8 - 2.0    Total Bilirubin 0.3 0.3 - 1.2 mg/dL    Alkaline Phosphatase 46 25 - 100 U/L    ALT 26 4 - 36 U/L    AST 17 8 - 33 U/L    Globulin 2.8 Not Established g/dL   Lipase   Result Value Ref Range    Lipase 28.0 5.6 - 51.3 U/L   Urinalysis with Reflex to Culture    Specimen: Urine   Result Value Ref Range    Color, UA Yellow Straw/Yellow    Clarity, UA Clear Clear    Glucose, Ur Negative Negative mg/dL    Bilirubin Urine Negative Negative    Ketones, Urine Negative Negative mg/dL    Specific Gravity, UA 1.010 1.005 - 1.030    Blood, Urine Negative Negative    pH, UA 5.5 5.0 - 8.0    Protein, UA Negative Negative mg/dL    Urobilinogen, Urine 0.2 <2.0 E.U./dL    Nitrite, Urine Negative Negative    Leukocyte Esterase, Urine Negative Negative    Microscopic Examination Not Indicated     Urine Type Voided     Urine Reflex to Culture Not Indicated      CT ABDOMEN PELVIS WO CONTRAST Additional Contrast? None    Result Date: 2/10/2023  EXAM: CT ABDOMEN AND PELVIS WITHOUT CONTRAST INDICATION: Lower abdominal pain. COMPARISON: Previous CT scan of the abdomen on 11/5/2018. TECHNIQUE: Axial CT imaging obtained from lung bases through pelvis. Axial images and multiplanar reformatted images are provided for review. Individualized dose optimization technique was used in order to meet ALARA standards for radiation dose reduction. In addition to vendor specific dose reduction algorithms, the dose reduction techniques vary based on the specific scanner utilized but frequently include automated exposure control, adjustment of the mA and/or kV according to patient size, and use of iterative reconstruction technique. IV Contrast: None Oral Contrast: None FINDINGS: LUNG BASES: Clear. LIVER: Normal. GALLBLADDER AND BILIARY DUCTS: No calcified gallstones. Not distended. No intra- or extrahepatic biliary dilatation. PANCREAS: Normal. SPLEEN: Normal. ADRENAL GLANDS: Normal. KIDNEYS AND URETERS: No hydronephrosis  No renal stones. URINARY BLADDER: Normal. REPRODUCTIVE ORGANS: No mass BOWEL: There are a few small diverticuli demonstrated in the descending colon. There is no CT evidence of acute diverticulitis. Normal appendix. LYMPH NODES: No abnormally enlarged nodes. PERITONEUM/RETROPERITONEUM: No ascites or free air. VESSELS: Aorta normal caliber. ABDOMINAL WALL: Normal. BONES: No destructive process. 1. No acute intra-abdominopelvic abnormality. Final ED Course and MDM: In brief, Jaydon Sevilla is a 45 y.o. male whose care was signed out to me by the outgoing provider. In brief, had blood work, urinalysis and CT scan abdomen/pelvis ordered prior to me taking over shift, currently awaiting CT scan report. 19:50 -patient reevaluated, appears in no acute distress, medically stable. Discussed with patient results of work-up obtained, advised him to follow-up with general surgery for additional outpatient testing, to include a colonoscopy. Patient to Dr. Felipe Huston. ED Medication Orders (From admission, onward)      None            Final Impression      1.  Lower abdominal pain New Problem       DISPOSITION Decision To Discharge 02/10/2023 08:56:17 PM     (Please note that portions of this note may have been completed with a voice recognition program. Efforts were made to edit the dictations but occasionally words are mis-transcribed.)    MD Shantel Dudley MD  02/11/23 9532

## 2023-02-11 NOTE — ED NOTES
Pt informed of d/c instructions, Pt verbalizes understanding. Pt standing in exam room, ready to go, Pt did not want V/S rechecked.       Gema Syed RN  02/10/23 2112

## 2023-07-24 ENCOUNTER — HOSPITAL ENCOUNTER (OUTPATIENT)
Dept: GENERAL RADIOLOGY | Facility: HOSPITAL | Age: 53
Discharge: HOME | End: 2023-07-24
Payer: MEDICAID

## 2023-07-24 ENCOUNTER — HOSPITAL ENCOUNTER (OUTPATIENT)
Facility: HOSPITAL | Age: 53
Discharge: HOME | End: 2023-07-24
Payer: MEDICAID

## 2023-07-24 DIAGNOSIS — G89.29 CHRONIC PAIN OF RIGHT KNEE: ICD-10-CM

## 2023-07-24 DIAGNOSIS — M25.562 CHRONIC PAIN OF LEFT KNEE: ICD-10-CM

## 2023-07-24 DIAGNOSIS — M25.561 CHRONIC PAIN OF RIGHT KNEE: ICD-10-CM

## 2023-07-24 DIAGNOSIS — G89.29 CHRONIC PAIN OF LEFT KNEE: ICD-10-CM

## 2023-07-24 PROCEDURE — 73562 X-RAY EXAM OF KNEE 3: CPT

## 2023-09-08 ENCOUNTER — APPOINTMENT (OUTPATIENT)
Dept: CT IMAGING | Facility: HOSPITAL | Age: 53
End: 2023-09-08
Payer: MEDICAID

## 2023-09-08 ENCOUNTER — HOSPITAL ENCOUNTER (EMERGENCY)
Facility: HOSPITAL | Age: 53
Discharge: HOME | End: 2023-09-08
Attending: HOSPITALIST
Payer: MEDICAID

## 2023-09-08 VITALS
TEMPERATURE: 98 F | OXYGEN SATURATION: 98 % | DIASTOLIC BLOOD PRESSURE: 81 MMHG | WEIGHT: 214 LBS | HEART RATE: 68 BPM | BODY MASS INDEX: 33.59 KG/M2 | RESPIRATION RATE: 16 BRPM | HEIGHT: 67 IN | SYSTOLIC BLOOD PRESSURE: 113 MMHG

## 2023-09-08 DIAGNOSIS — R10.30 LOWER ABDOMINAL PAIN: Primary | ICD-10-CM

## 2023-09-08 LAB
ALBUMIN SERPL-MCNC: 4.5 G/DL (ref 3.4–4.8)
ALBUMIN/GLOB SERPL: 1.7 {RATIO} (ref 0.8–2)
ALP SERPL-CCNC: 47 U/L (ref 25–100)
ALT SERPL-CCNC: 21 U/L (ref 4–36)
ANION GAP SERPL CALCULATED.3IONS-SCNC: 8 MMOL/L (ref 3–16)
AST SERPL-CCNC: 14 U/L (ref 8–33)
BACTERIA URNS QL MICRO: ABNORMAL /HPF
BASOPHILS # BLD: 0.1 K/UL (ref 0–0.1)
BASOPHILS NFR BLD: 0.9 %
BILIRUB SERPL-MCNC: 0.8 MG/DL (ref 0.3–1.2)
BILIRUB UR QL STRIP.AUTO: NEGATIVE
BUN SERPL-MCNC: 13 MG/DL (ref 6–20)
CALCIUM SERPL-MCNC: 9.5 MG/DL (ref 8.5–10.5)
CHLORIDE SERPL-SCNC: 104 MMOL/L (ref 98–107)
CLARITY UR: CLEAR
CO2 SERPL-SCNC: 27 MMOL/L (ref 20–30)
COLOR UR: YELLOW
CREAT SERPL-MCNC: 1 MG/DL (ref 0.4–1.2)
EOSINOPHIL # BLD: 0.6 K/UL (ref 0–0.4)
EOSINOPHIL NFR BLD: 7.3 %
EPI CELLS #/AREA URNS HPF: ABNORMAL /HPF (ref 0–5)
ERYTHROCYTE [DISTWIDTH] IN BLOOD BY AUTOMATED COUNT: 12.6 % (ref 11–16)
GFR SERPLBLD CREATININE-BSD FMLA CKD-EPI: >60 ML/MIN/{1.73_M2}
GLOBULIN SER CALC-MCNC: 2.6 G/DL
GLUCOSE SERPL-MCNC: 99 MG/DL (ref 74–106)
GLUCOSE UR STRIP.AUTO-MCNC: NEGATIVE MG/DL
HCT VFR BLD AUTO: 41.7 % (ref 40–54)
HGB BLD-MCNC: 16.1 G/DL (ref 13–18)
HGB UR QL STRIP.AUTO: ABNORMAL
IMM GRANULOCYTES # BLD: 0 K/UL
IMM GRANULOCYTES NFR BLD: 0.2 % (ref 0–5)
KETONES UR STRIP.AUTO-MCNC: NEGATIVE MG/DL
LEUKOCYTE ESTERASE UR QL STRIP.AUTO: NEGATIVE
LIPASE SERPL-CCNC: 26 U/L (ref 5.6–51.3)
LYMPHOCYTES # BLD: 2.7 K/UL (ref 1.5–4)
LYMPHOCYTES NFR BLD: 33.5 %
MCH RBC QN AUTO: 34.5 PG (ref 27–32)
MCHC RBC AUTO-ENTMCNC: 38.6 G/DL (ref 31–35)
MCV RBC AUTO: 89.5 FL (ref 80–100)
MONOCYTES # BLD: 0.5 K/UL (ref 0.2–0.8)
MONOCYTES NFR BLD: 6.1 %
MUCOUS THREADS URNS QL MICRO: ABNORMAL /LPF
NEUTROPHILS # BLD: 4.2 K/UL (ref 2–7.5)
NEUTS SEG NFR BLD: 52 %
NITRITE UR QL STRIP.AUTO: NEGATIVE
PH UR STRIP.AUTO: 5.5 [PH] (ref 5–8)
PLATELET # BLD AUTO: 247 K/UL (ref 150–400)
PMV BLD AUTO: 10 FL (ref 6–10)
POTASSIUM SERPL-SCNC: 4.5 MMOL/L (ref 3.4–5.1)
PROT SERPL-MCNC: 7.1 G/DL (ref 6.4–8.3)
PROT UR STRIP.AUTO-MCNC: NEGATIVE MG/DL
RBC # BLD AUTO: 4.66 M/UL (ref 4.5–6)
RBC #/AREA URNS HPF: ABNORMAL /HPF (ref 0–4)
SODIUM SERPL-SCNC: 139 MMOL/L (ref 136–145)
SP GR UR STRIP.AUTO: 1.01 (ref 1–1.03)
UA COMPLETE W REFLEX CULTURE PNL UR: ABNORMAL
UA DIPSTICK W REFLEX MICRO PNL UR: YES
URN SPEC COLLECT METH UR: ABNORMAL
UROBILINOGEN UR STRIP-ACNC: 0.2 E.U./DL
WBC # BLD AUTO: 8 K/UL (ref 4–11)
WBC #/AREA URNS HPF: ABNORMAL /HPF (ref 0–5)

## 2023-09-08 PROCEDURE — 74176 CT ABD & PELVIS W/O CONTRAST: CPT

## 2023-09-08 PROCEDURE — 85025 COMPLETE CBC W/AUTO DIFF WBC: CPT

## 2023-09-08 PROCEDURE — 80053 COMPREHEN METABOLIC PANEL: CPT

## 2023-09-08 PROCEDURE — 83690 ASSAY OF LIPASE: CPT

## 2023-09-08 PROCEDURE — 36415 COLL VENOUS BLD VENIPUNCTURE: CPT

## 2023-09-08 PROCEDURE — 99284 EMERGENCY DEPT VISIT MOD MDM: CPT

## 2023-09-08 PROCEDURE — 81001 URINALYSIS AUTO W/SCOPE: CPT

## 2023-09-08 ASSESSMENT — PAIN SCALES - GENERAL: PAINLEVEL_OUTOF10: 3

## 2023-09-08 ASSESSMENT — PAIN DESCRIPTION - LOCATION: LOCATION: ABDOMEN

## 2023-09-08 ASSESSMENT — PAIN DESCRIPTION - ONSET: ONSET: ON-GOING

## 2023-09-08 ASSESSMENT — PAIN - FUNCTIONAL ASSESSMENT: PAIN_FUNCTIONAL_ASSESSMENT: 0-10

## 2023-09-08 ASSESSMENT — LIFESTYLE VARIABLES: HOW OFTEN DO YOU HAVE A DRINK CONTAINING ALCOHOL: NEVER

## 2023-09-08 ASSESSMENT — PAIN DESCRIPTION - ORIENTATION: ORIENTATION: LOWER;RIGHT;LEFT

## 2023-09-08 ASSESSMENT — PAIN DESCRIPTION - FREQUENCY: FREQUENCY: CONTINUOUS

## 2023-09-08 ASSESSMENT — PAIN DESCRIPTION - PAIN TYPE: TYPE: ACUTE PAIN

## 2023-09-08 NOTE — ED TRIAGE NOTES
Pt to ED with complaints of lower abdominal pain x 7-8 months but pain has worsened over the last 3 weeks. Pt states pain was a 10/10 last night and he was unable to sleep but the pain is better today. Pt denies any fever, nausea, vomiting, diarrhea or injury.

## 2023-09-08 NOTE — ED NOTES
Discharge instructions provided to patient. Patient verbalizes understanding of d/c plan and follow up care, however, does report he will not be calling Dr. Mateus Lovett for possible colonoscopy. States,  \"I'll pass\". Patient ambulatory off unit to POV at this time with no further needs noted.        Nicole Keane RN  09/08/23 1025

## 2023-09-08 NOTE — ED PROVIDER NOTES
[address] ENCOUNTER        Pt Name: Sandra Bacon  MRN: [id number]  [address] [facility] [address] 1970  Date of evaluation: 9/8/2023  Provider: Chang Andre DO  PCP: AISHWARYA Layne  Note Started: 8:45 AM EDT 9/8/23    CHIEF COMPLAINT       Chief Complaint   Patient presents with    Abdominal Pain       HISTORY OF PRESENT ILLNESS: 1 or more Elements     History from : Patient    Limitations to history : None    Sandra Bacon is a 52 y.o. male who presents to the emergency department for intermittent lower abdominal pain. Patient states he is actually been having abdominal pain on and off for the past 6 to 7 months. He was actually evaluated here then also at [facility] 6 months ago for the same complaint. He only followed up with his regular family physician he did not see any general surgeon for colonoscopy. Patient is 52 and has not had one performed. Patient states that he just gets lower abdominal discomfort which last for several weeks and then will go away. He states this episode is been lasting right about 3 weeks. He states last night pain was a 10 out of 10 but it is much better today he only rates it about a 3 or a 4. He puts his hands at the right lower quadrant and left lower quadrant area and states that he has had pain to this area and when asked how to describe it he just states it feels like someone has \"kicked you in the nuts\". He states that the only way that he can describe it at times. He denies any fevers or chills with that he denies any nausea or vomiting or diarrhea. Denies any constipation. Denies any trauma or injury to the abdominal area. He states he is able to eat and drink without any difficulty. He denies any dark tarry or bright red bloody stools. Denies any headache at the ordinary. Denies any earache or sore throat. Denies any cough. Denies any loss of taste or smell.   Denies any chest pain or shortness

## 2023-10-13 ENCOUNTER — TELEPHONE (OUTPATIENT)
Dept: SURGERY | Facility: CLINIC | Age: 53
End: 2023-10-13
Payer: COMMERCIAL

## 2024-03-14 ENCOUNTER — TELEPHONE (OUTPATIENT)
Dept: SURGERY | Facility: CLINIC | Age: 54
End: 2024-03-14
Payer: COMMERCIAL

## 2024-03-14 NOTE — TELEPHONE ENCOUNTER
"Called pt regarding a referral we received from [facility] for a colonoscopy-pt stated he is \"not interested\" in having a colonoscopy. Will send this referral back to referring office.   "

## 2024-03-19 ENCOUNTER — APPOINTMENT (OUTPATIENT)
Dept: CT IMAGING | Facility: HOSPITAL | Age: 54
End: 2024-03-19
Payer: COMMERCIAL

## 2024-03-19 ENCOUNTER — HOSPITAL ENCOUNTER (EMERGENCY)
Facility: HOSPITAL | Age: 54
Discharge: HOME | End: 2024-03-20
Attending: STUDENT IN AN ORGANIZED HEALTH CARE EDUCATION/TRAINING PROGRAM | Admitting: STUDENT IN AN ORGANIZED HEALTH CARE EDUCATION/TRAINING PROGRAM
Payer: COMMERCIAL

## 2024-03-19 VITALS
TEMPERATURE: 98.8 F | WEIGHT: 217 LBS | SYSTOLIC BLOOD PRESSURE: 131 MMHG | RESPIRATION RATE: 16 BRPM | HEART RATE: 71 BPM | HEIGHT: 68 IN | OXYGEN SATURATION: 95 % | BODY MASS INDEX: 32.89 KG/M2 | DIASTOLIC BLOOD PRESSURE: 92 MMHG

## 2024-03-19 DIAGNOSIS — N30.90 INFLAMMATION OF BLADDER: Primary | ICD-10-CM

## 2024-03-19 DIAGNOSIS — R31.9 HEMATURIA, UNSPECIFIED TYPE: ICD-10-CM

## 2024-03-19 LAB
ALBUMIN SERPL-MCNC: 4.6 G/DL (ref 3.5–5.2)
ALBUMIN/GLOB SERPL: 1.5 G/DL
ALP SERPL-CCNC: 51 U/L (ref 39–117)
ALT SERPL W P-5'-P-CCNC: 26 U/L (ref 1–41)
ANION GAP SERPL CALCULATED.3IONS-SCNC: 10.1 MMOL/L (ref 5–15)
AST SERPL-CCNC: 19 U/L (ref 1–40)
BACTERIA UR QL AUTO: NORMAL /HPF
BASOPHILS # BLD AUTO: 0.06 10*3/MM3 (ref 0–0.2)
BASOPHILS NFR BLD AUTO: 0.7 % (ref 0–1.5)
BILIRUB SERPL-MCNC: 0.5 MG/DL (ref 0–1.2)
BILIRUB UR QL STRIP: NEGATIVE
BUN SERPL-MCNC: 11 MG/DL (ref 6–20)
BUN/CREAT SERPL: 10.2 (ref 7–25)
CALCIUM SPEC-SCNC: 9.5 MG/DL (ref 8.6–10.5)
CHLORIDE SERPL-SCNC: 99 MMOL/L (ref 98–107)
CLARITY UR: CLEAR
CO2 SERPL-SCNC: 27.9 MMOL/L (ref 22–29)
COLOR UR: YELLOW
CREAT SERPL-MCNC: 1.08 MG/DL (ref 0.76–1.27)
D-LACTATE SERPL-SCNC: 1.2 MMOL/L (ref 0.5–2)
DEPRECATED RDW RBC AUTO: 40.9 FL (ref 37–54)
EGFRCR SERPLBLD CKD-EPI 2021: 81 ML/MIN/1.73
EOSINOPHIL # BLD AUTO: 0.89 10*3/MM3 (ref 0–0.4)
EOSINOPHIL NFR BLD AUTO: 10.5 % (ref 0.3–6.2)
ERYTHROCYTE [DISTWIDTH] IN BLOOD BY AUTOMATED COUNT: 12.4 % (ref 12.3–15.4)
GLOBULIN UR ELPH-MCNC: 3 GM/DL
GLUCOSE SERPL-MCNC: 97 MG/DL (ref 65–99)
GLUCOSE UR STRIP-MCNC: NEGATIVE MG/DL
HCT VFR BLD AUTO: 43 % (ref 37.5–51)
HGB BLD-MCNC: 14.8 G/DL (ref 13–17.7)
HGB UR QL STRIP.AUTO: ABNORMAL
HYALINE CASTS UR QL AUTO: NORMAL /LPF
IMM GRANULOCYTES # BLD AUTO: 0.02 10*3/MM3 (ref 0–0.05)
IMM GRANULOCYTES NFR BLD AUTO: 0.2 % (ref 0–0.5)
KETONES UR QL STRIP: NEGATIVE
LEUKOCYTE ESTERASE UR QL STRIP.AUTO: NEGATIVE
LIPASE SERPL-CCNC: 32 U/L (ref 13–60)
LYMPHOCYTES # BLD AUTO: 2.81 10*3/MM3 (ref 0.7–3.1)
LYMPHOCYTES NFR BLD AUTO: 33.1 % (ref 19.6–45.3)
MCH RBC QN AUTO: 30.5 PG (ref 26.6–33)
MCHC RBC AUTO-ENTMCNC: 34.4 G/DL (ref 31.5–35.7)
MCV RBC AUTO: 88.5 FL (ref 79–97)
MONOCYTES # BLD AUTO: 1.02 10*3/MM3 (ref 0.1–0.9)
MONOCYTES NFR BLD AUTO: 12 % (ref 5–12)
NEUTROPHILS NFR BLD AUTO: 3.68 10*3/MM3 (ref 1.7–7)
NEUTROPHILS NFR BLD AUTO: 43.5 % (ref 42.7–76)
NITRITE UR QL STRIP: NEGATIVE
NRBC BLD AUTO-RTO: 0 /100 WBC (ref 0–0.2)
PH UR STRIP.AUTO: 5.5 [PH] (ref 5–8)
PLATELET # BLD AUTO: 237 10*3/MM3 (ref 140–450)
PMV BLD AUTO: 10.4 FL (ref 6–12)
POTASSIUM SERPL-SCNC: 3.9 MMOL/L (ref 3.5–5.2)
PROT SERPL-MCNC: 7.6 G/DL (ref 6–8.5)
PROT UR QL STRIP: NEGATIVE
RBC # BLD AUTO: 4.86 10*6/MM3 (ref 4.14–5.8)
RBC # UR STRIP: NORMAL /HPF
REF LAB TEST METHOD: NORMAL
SODIUM SERPL-SCNC: 137 MMOL/L (ref 136–145)
SP GR UR STRIP: 1.02 (ref 1–1.03)
SQUAMOUS #/AREA URNS HPF: NORMAL /HPF
UROBILINOGEN UR QL STRIP: ABNORMAL
WBC # UR STRIP: NORMAL /HPF
WBC NRBC COR # BLD AUTO: 8.48 10*3/MM3 (ref 3.4–10.8)

## 2024-03-19 PROCEDURE — 80053 COMPREHEN METABOLIC PANEL: CPT | Performed by: STUDENT IN AN ORGANIZED HEALTH CARE EDUCATION/TRAINING PROGRAM

## 2024-03-19 PROCEDURE — 83690 ASSAY OF LIPASE: CPT | Performed by: STUDENT IN AN ORGANIZED HEALTH CARE EDUCATION/TRAINING PROGRAM

## 2024-03-19 PROCEDURE — 81001 URINALYSIS AUTO W/SCOPE: CPT | Performed by: STUDENT IN AN ORGANIZED HEALTH CARE EDUCATION/TRAINING PROGRAM

## 2024-03-19 PROCEDURE — 96374 THER/PROPH/DIAG INJ IV PUSH: CPT

## 2024-03-19 PROCEDURE — 25810000003 SODIUM CHLORIDE 0.9 % SOLUTION: Performed by: STUDENT IN AN ORGANIZED HEALTH CARE EDUCATION/TRAINING PROGRAM

## 2024-03-19 PROCEDURE — 99285 EMERGENCY DEPT VISIT HI MDM: CPT

## 2024-03-19 PROCEDURE — 25010000002 KETOROLAC TROMETHAMINE PER 15 MG: Performed by: STUDENT IN AN ORGANIZED HEALTH CARE EDUCATION/TRAINING PROGRAM

## 2024-03-19 PROCEDURE — 85025 COMPLETE CBC W/AUTO DIFF WBC: CPT | Performed by: STUDENT IN AN ORGANIZED HEALTH CARE EDUCATION/TRAINING PROGRAM

## 2024-03-19 PROCEDURE — 25510000001 IOPAMIDOL 61 % SOLUTION: Performed by: STUDENT IN AN ORGANIZED HEALTH CARE EDUCATION/TRAINING PROGRAM

## 2024-03-19 PROCEDURE — 74177 CT ABD & PELVIS W/CONTRAST: CPT

## 2024-03-19 PROCEDURE — 83605 ASSAY OF LACTIC ACID: CPT | Performed by: STUDENT IN AN ORGANIZED HEALTH CARE EDUCATION/TRAINING PROGRAM

## 2024-03-19 RX ORDER — KETOROLAC TROMETHAMINE 30 MG/ML
15 INJECTION, SOLUTION INTRAMUSCULAR; INTRAVENOUS ONCE
Status: COMPLETED | OUTPATIENT
Start: 2024-03-19 | End: 2024-03-19

## 2024-03-19 RX ORDER — SODIUM CHLORIDE 0.9 % (FLUSH) 0.9 %
10 SYRINGE (ML) INJECTION AS NEEDED
Status: DISCONTINUED | OUTPATIENT
Start: 2024-03-19 | End: 2024-03-20 | Stop reason: HOSPADM

## 2024-03-19 RX ADMIN — SODIUM CHLORIDE 1000 ML: 9 INJECTION, SOLUTION INTRAVENOUS at 23:12

## 2024-03-19 RX ADMIN — KETOROLAC TROMETHAMINE 15 MG: 30 INJECTION, SOLUTION INTRAMUSCULAR; INTRAVENOUS at 23:12

## 2024-03-19 RX ADMIN — IOPAMIDOL 100 ML: 612 INJECTION, SOLUTION INTRAVENOUS at 23:43

## 2024-03-20 LAB
HOLD SPECIMEN: NORMAL
HOLD SPECIMEN: NORMAL
WHOLE BLOOD HOLD COAG: NORMAL
WHOLE BLOOD HOLD SPECIMEN: NORMAL

## 2024-03-20 RX ORDER — PHENAZOPYRIDINE HYDROCHLORIDE 95 MG/1
95 TABLET ORAL 3 TIMES DAILY PRN
Qty: 15 TABLET | Refills: 0 | Status: SHIPPED | OUTPATIENT
Start: 2024-03-20

## 2024-03-20 NOTE — ED PROVIDER NOTES
EMERGENCY DEPARTMENT ENCOUNTER    Pt Name: Evelio Gonzalez  MRN: [id number]  Pt :   1970  Room Number:    Date of encounter:  3/19/2024  PCP: Eloy Matta PA  ED Provider: Watson Navarro MD    Historian: Patient      HPI:  Chief Complaint: Abdominal pain        Context: Evelio Gonzalez is a 53 y.o. male who presents to the ED c/o abdominal pain.  Patient states the past 6 months he has been having intermittent issues with lower abdominal pain.  For the past 12 hours has had much worse pain throughout his lower abdomen.  Denies any vomiting or diarrhea.  Last bowel movement was this morning and was normal.  Denies any blood in stool.  Denies any fevers.  Has seen his primary care doctor before for this problem but has never been able to find out what is wrong.      PAST MEDICAL HISTORY  Past Medical History:   Diagnosis Date    Acne vulgaris     Chronic back pain     GERD (gastroesophageal reflux disease)     PTSD (post-traumatic stress disorder)          PAST SURGICAL HISTORY  History reviewed. No pertinent surgical history.      FAMILY HISTORY  History reviewed. No pertinent family history.      SOCIAL HISTORY  Social History     Socioeconomic History    Marital status:    Tobacco Use    Smoking status: Former     Types: Cigarettes    Smokeless tobacco: Current     Types: Snuff   Vaping Use    Vaping status: Never Used   Substance and Sexual Activity    Alcohol use: No    Drug use: Never    Sexual activity: Defer         ALLERGIES  Shrimp and Penicillins        REVIEW OF SYSTEMS  Review of Systems     All systems reviewed and negative except for those discussed in HPI.       PHYSICAL EXAM    I have reviewed the triage vital signs and nursing notes.    ED Triage Vitals   Temp Heart Rate Resp BP SpO2   24 2245 24 2245 24 2245 24 22424   98.8 °F (37.1 °C) 74 17 116/43 96 %      Temp src Heart Rate Source Patient Position BP Location FiO2 (%)   24  03/19/24 2245 03/19/24 2246 03/19/24 2246 --   Oral Monitor Sitting Left arm        Physical Exam    General:  Awake, alert, no acute distress  HEENT: Atraumatic, normocephalic, EOMI, PERRLA, mucous membranes moist  NECK:  Supple, atraumatic  Cardiovascular:  Regular rate, regular rhythm, no murmurs, rubs, or gallops.  Extremities well perfused   Respiratory:  Regular rate, clear lungs to auscultation bilaterally.  No rhonchi, rales, wheezing  Abdominal:  Soft, nondistended, tenderness in bilateral lower quadrants.  No guarding or rebound.  No palpable masses  Extremity:  No visible bony abnormalities in all 4 extremities.  Full range of motion of all extremities.  Skin:  Warm and dry.  No rashes  Neuro:  AAOx3, GCS 15. Cranial nerves 2-12 grossly intact.  No focal strength or sensation deficits.  Psych:  Mood and affect appropriate.        LAB RESULTS  Recent Results (from the past 24 hour(s))   Comprehensive Metabolic Panel    Collection Time: 03/19/24 10:52 PM    Specimen: Blood   Result Value Ref Range    Glucose 97 65 - 99 mg/dL    BUN 11 6 - 20 mg/dL    Creatinine 1.08 0.76 - 1.27 mg/dL    Sodium 137 136 - 145 mmol/L    Potassium 3.9 3.5 - 5.2 mmol/L    Chloride 99 98 - 107 mmol/L    CO2 27.9 22.0 - 29.0 mmol/L    Calcium 9.5 8.6 - 10.5 mg/dL    Total Protein 7.6 6.0 - 8.5 g/dL    Albumin 4.6 3.5 - 5.2 g/dL    ALT (SGPT) 26 1 - 41 U/L    AST (SGOT) 19 1 - 40 U/L    Alkaline Phosphatase 51 39 - 117 U/L    Total Bilirubin 0.5 0.0 - 1.2 mg/dL    Globulin 3.0 gm/dL    A/G Ratio 1.5 g/dL    BUN/Creatinine Ratio 10.2 7.0 - 25.0    Anion Gap 10.1 5.0 - 15.0 mmol/L    eGFR 81.0 >60.0 mL/min/1.73   Lipase    Collection Time: 03/19/24 10:52 PM    Specimen: Blood   Result Value Ref Range    Lipase 32 13 - 60 U/L   Lactic Acid, Plasma    Collection Time: 03/19/24 10:52 PM    Specimen: Blood   Result Value Ref Range    Lactate 1.2 0.5 - 2.0 mmol/L   Green Top (Gel)    Collection Time: 03/19/24 10:52 PM   Result Value Ref  Range    Extra Tube Hold for add-ons.    Lavender Top    Collection Time: 03/19/24 10:52 PM   Result Value Ref Range    Extra Tube hold for add-on    Gold Top - SST    Collection Time: 03/19/24 10:52 PM   Result Value Ref Range    Extra Tube Hold for add-ons.    Light Blue Top    Collection Time: 03/19/24 10:52 PM   Result Value Ref Range    Extra Tube Hold for add-ons.    CBC Auto Differential    Collection Time: 03/19/24 10:52 PM    Specimen: Blood   Result Value Ref Range    WBC 8.48 3.40 - 10.80 10*3/mm3    RBC 4.86 4.14 - 5.80 10*6/mm3    Hemoglobin 14.8 13.0 - 17.7 g/dL    Hematocrit 43.0 37.5 - 51.0 %    MCV 88.5 79.0 - 97.0 fL    MCH 30.5 26.6 - 33.0 pg    MCHC 34.4 31.5 - 35.7 g/dL    RDW 12.4 12.3 - 15.4 %    RDW-SD 40.9 37.0 - 54.0 fl    MPV 10.4 6.0 - 12.0 fL    Platelets 237 140 - 450 10*3/mm3    Neutrophil % 43.5 42.7 - 76.0 %    Lymphocyte % 33.1 19.6 - 45.3 %    Monocyte % 12.0 5.0 - 12.0 %    Eosinophil % 10.5 (H) 0.3 - 6.2 %    Basophil % 0.7 0.0 - 1.5 %    Immature Grans % 0.2 0.0 - 0.5 %    Neutrophils, Absolute 3.68 1.70 - 7.00 10*3/mm3    Lymphocytes, Absolute 2.81 0.70 - 3.10 10*3/mm3    Monocytes, Absolute 1.02 (H) 0.10 - 0.90 10*3/mm3    Eosinophils, Absolute 0.89 (H) 0.00 - 0.40 10*3/mm3    Basophils, Absolute 0.06 0.00 - 0.20 10*3/mm3    Immature Grans, Absolute 0.02 0.00 - 0.05 10*3/mm3    nRBC 0.0 0.0 - 0.2 /100 WBC   Urinalysis With Microscopic If Indicated (No Culture) - Urine, Clean Catch    Collection Time: 03/19/24 10:58 PM    Specimen: Urine, Clean Catch   Result Value Ref Range    Color, UA Yellow Yellow, Straw    Appearance, UA Clear Clear    pH, UA 5.5 5.0 - 8.0    Specific Gravity, UA 1.021 1.005 - 1.030    Glucose, UA Negative Negative    Ketones, UA Negative Negative    Bilirubin, UA Negative Negative    Blood, UA Trace (A) Negative    Protein, UA Negative Negative    Leuk Esterase, UA Negative Negative    Nitrite, UA Negative Negative    Urobilinogen, UA 0.2 E.U./dL 0.2 -  1.0 E.U./dL   Urinalysis, Microscopic Only - Urine, Clean Catch    Collection Time: 03/19/24 10:58 PM    Specimen: Urine, Clean Catch   Result Value Ref Range    RBC, UA 0-2 None Seen, 0-2 /HPF    WBC, UA None Seen None Seen, 0-2 /HPF    Bacteria, UA None Seen None Seen /HPF    Squamous Epithelial Cells, UA None Seen None Seen, 0-2 /HPF    Hyaline Casts, UA None Seen None Seen /LPF    Methodology Manual Light Microscopy        If labs were ordered, I independently reviewed the results and considered them in treating the patient.        RADIOLOGY  CT Abdomen Pelvis With Contrast    Result Date: 3/19/2024  FINAL REPORT TECHNIQUE: Axial CT images were performed from the lung bases through the symphysis pubis after the administration of intravenous contrast.  This study was performed with techniques to keep radiation doses as low as reasonably achievable (ALARA). Individualized dose reduction techniques using automated exposure control or adjustment of mA and/or kV according to the patient's size were employed. CLINICAL HISTORY: Diffuse lower abdominal pain ACROSS LOWER ABD FINDINGS: LOWER CHEST: The heart is normal size.  The lung bases are clear.  ABDOMEN/PELVIS:  Liver, gallbladder and bile ducts: The liver enhances homogeneously without suspicious focal hepatic lesion.  The gallbladder is unremarkable.  There is no definite biliary duct dilatation.  Adrenal glands: The adrenal glands are morphologically unremarkable without suspicious lesion. Kidneys, ureter and urinary bladder: No suspicious renal lesion. No hydronephrosis. There is urinary bladder wall thickening. Spleen: The spleen is normal size.  Pancreas: The pancreas is grossly unremarkable.  GI systems and mesentery: There is colonic diverticulosis without diverticulitis. No evidence of bowel obstruction.  The appendix is visualized and unremarkable in appearance.  No significant mesenteric inflammation.  Lymph nodes: No definite pathologically enlarged  abdominal or pelvic lymph nodes present.  Vessels: The aorta and abdominal arteries are grossly patent.  The IVC and portal vein are patent and grossly unremarkable.  Peritoneum: No free intraperitoneal fluid or pneumoperitoneum.  Pelvic viscera: No acute findings.  Body wall: No body wall contusion. No significant body wall hernias. Bones: No acute fracture.     Urinary bladder wall thickening, correlate with urinalysis for cystitis. Authenticated and Electronically Signed by Jhon Avalos MD on 03/19/2024 11:56:06 PM     I ordered and independently reviewed the above noted radiographic studies.     See radiologist's dictation for official interpretation.        PROCEDURES    Procedures    No orders to display       MEDICATIONS GIVEN IN ER    Medications   sodium chloride 0.9 % flush 10 mL (has no administration in time range)   ketorolac (TORADOL) injection 15 mg (15 mg Intravenous Given 3/19/24 2312)   sodium chloride 0.9 % bolus 1,000 mL (1,000 mL Intravenous New Bag 3/19/24 2312)   iopamidol (ISOVUE-300) 61 % injection 100 mL (100 mL Intravenous Given 3/19/24 2343)         MEDICAL DECISION MAKING, PROGRESS, and CONSULTS    All labs, if obtained, have been independently reviewed by me.  All radiology studies, if obtained, have been reviewed by me and the radiologist dictating the report.  All EKG's, if obtained, have been independently viewed and interpreted by me.      Discussion below represents my analysis of pertinent findings related to patient's condition, differential diagnosis, treatment plan and final disposition.    Evelio Gonzalez is a 53 y.o. male who presents to the ED c/o abdominal pain.  Hemodynamically stable nontoxic in appearance upon arrival.  Differential diagnosis includes was not limited to constipation, appendicitis, kidney stone, UTI, pyelonephritis, diverticulitis, inflammatory bowel disease, pancreatitis.  Extensive labs ordered along with urinalysis.  CT of abdomen pelvis with contrast  also obtained.  Patient given 15 mg of IV Toradol for pain control and 1 L bolus of normal saline IV fluids.  Labs personally reviewed and interpreted showing no significant leukocytosis or anemia.  Urinalysis shows trace blood but no secondary evidence of infection.    CT shows evidence of bladder wall thickening but no other acute abnormalities noted by radiology.  Due to patient having some blood in urinalysis along with bladder wall thickening on CT imaging provided with referral information for urology for outpatient follow-up.  Discussed all results with patient and encouraged need for outpatient follow-up to which she voiced understanding and was agreeable.  Patient discharged.                               Orders placed during this visit:  Orders Placed This Encounter   Procedures    CT Abdomen Pelvis With Contrast    [place] Draw    Comprehensive Metabolic Panel    Lipase    Urinalysis With Microscopic If Indicated (No Culture) - Urine, Clean Catch    Lactic Acid, Plasma    CBC Auto Differential    Urinalysis, Microscopic Only - Urine, Clean Catch    NPO Diet NPO Type: Strict NPO    Undress & Gown    Insert Peripheral IV    CBC & Differential    Green Top (Gel)    Lavender Top    Gold Top - SST    Light Blue Top         ED Course:    Consultants:                  Shared Decision Making:  After my consideration of clinical presentation and any laboratory/radiology studies obtained, I discussed the findings with the patient/patient representative who is in agreement with the treatment plan and the final disposition.   Risks and benefits of discharge and/or observation/admission were discussed.      AS OF 00:15 EDT VITALS:    BP - 131/92  HR - 71  TEMP - 98.8 °F (37.1 °C) (Oral)  O2 SATS - 95%                  DIAGNOSIS  Final diagnoses:   Inflammation of bladder   Hematuria, unspecified type         DISPOSITION  Discharge      Please note that portions of this document were completed with voice recognition  software.        Watson Navarro MD  03/20/24 2145

## 2024-03-20 NOTE — DISCHARGE INSTRUCTIONS
Follow-up with urology as an outpatient, contact number provided to arrange appointment.  Continue Tylenol and Motrin as needed for pain.  May also take Azo to help with bladder irritation/pain.

## 2024-03-27 ENCOUNTER — TELEPHONE (OUTPATIENT)
Dept: SURGERY | Facility: CLINIC | Age: 54
End: 2024-03-27

## 2024-03-27 NOTE — TELEPHONE ENCOUNTER
Patient no showed for appointment with Dr Cadena. Called and spoke with patient stated he had the flu rescheduled him for 04/11/24 @ [facility]

## 2024-04-10 ENCOUNTER — TELEPHONE (OUTPATIENT)
Dept: SURGERY | Facility: CLINIC | Age: 54
End: 2024-04-10

## 2024-06-02 ENCOUNTER — HOSPITAL ENCOUNTER (EMERGENCY)
Facility: HOSPITAL | Age: 54
Discharge: HOME | End: 2024-06-02
Attending: EMERGENCY MEDICINE | Admitting: EMERGENCY MEDICINE
Payer: COMMERCIAL

## 2024-06-02 ENCOUNTER — APPOINTMENT (OUTPATIENT)
Dept: GENERAL RADIOLOGY | Facility: HOSPITAL | Age: 54
End: 2024-06-02
Payer: COMMERCIAL

## 2024-06-02 VITALS
SYSTOLIC BLOOD PRESSURE: 141 MMHG | BODY MASS INDEX: 31.83 KG/M2 | HEIGHT: 68 IN | DIASTOLIC BLOOD PRESSURE: 86 MMHG | WEIGHT: 210 LBS | HEART RATE: 69 BPM | RESPIRATION RATE: 18 BRPM | OXYGEN SATURATION: 97 % | TEMPERATURE: 98 F

## 2024-06-02 DIAGNOSIS — S52.502B TYPE I OR II OPEN FRACTURE OF DISTAL END OF LEFT RADIUS, UNSPECIFIED FRACTURE MORPHOLOGY, INITIAL ENCOUNTER: Primary | ICD-10-CM
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
## 2024-06-02 PROCEDURE — 25010000002 LIDOCAINE 1 % SOLUTION

## 2024-06-02 PROCEDURE — 99283 EMERGENCY DEPT VISIT LOW MDM: CPT

## 2024-06-02 PROCEDURE — 73110 X-RAY EXAM OF WRIST: CPT

## 2024-06-02 PROCEDURE — 96365 THER/PROPH/DIAG IV INF INIT: CPT

## 2024-06-02 PROCEDURE — 25010000002 CEFAZOLIN PER 500 MG

## 2024-06-02 PROCEDURE — 73090 X-RAY EXAM OF FOREARM: CPT

## 2024-06-02 RX ORDER — HYDROCODONE BITARTRATE AND ACETAMINOPHEN 5; 325 MG/1; MG/1
1 TABLET ORAL EVERY 6 HOURS PRN
Qty: 10 TABLET | Refills: 0 | Status: SHIPPED | OUTPATIENT
Start: 2024-06-02

## 2024-06-02 RX ORDER — CEPHALEXIN 500 MG/1
500 CAPSULE ORAL 4 TIMES DAILY
Qty: 28 CAPSULE | Refills: 0 | Status: SHIPPED | OUTPATIENT
Start: 2024-06-02 | End: 2024-06-09

## 2024-06-02 RX ORDER — LIDOCAINE HYDROCHLORIDE 10 MG/ML
10 INJECTION, SOLUTION INFILTRATION; PERINEURAL ONCE
Status: COMPLETED | OUTPATIENT
Start: 2024-06-02 | End: 2024-06-02

## 2024-06-02 RX ORDER — HYDROCODONE BITARTRATE AND ACETAMINOPHEN 7.5; 325 MG/1; MG/1
1 TABLET ORAL ONCE
Status: COMPLETED | OUTPATIENT
Start: 2024-06-02 | End: 2024-06-02

## 2024-06-02 RX ADMIN — HYDROCODONE BITARTRATE AND ACETAMINOPHEN 1 TABLET: 7.5; 325 TABLET ORAL at 18:08

## 2024-06-02 RX ADMIN — CEFAZOLIN 1000 MG: 1 INJECTION, POWDER, FOR SOLUTION INTRAMUSCULAR; INTRAVENOUS at 18:25

## 2024-06-02 RX ADMIN — LIDOCAINE HYDROCHLORIDE 10 ML: 10 INJECTION, SOLUTION INFILTRATION; PERINEURAL at 18:27

## 2024-06-02 NOTE — ED PROVIDER NOTES
"Subjective  History of Present Illness:    This is a 53-year-old male, history of chronic back pain, PTSD presenting for left wrist pain.  Patient reports he was trying to skateboard when it slipped out from under him, he fell on outstretched wrist.  He did not hit his head.  No loss of  conscious.  No neck or back pain, no hip pain.  He does have pain and swelling to the left wrist with an abrasion on the palmar aspect of the left distal wrist.  Reports that he is up-to-date on tetanus.  No other complaints.  Decreased range of motion about the left wrist secondary to pain and swelling.  Injury occurred just prior to arrival.      Nurses Notes reviewed and agree, including vitals, allergies, social history and prior medical history.     REVIEW OF SYSTEMS: All systems reviewed and not pertinent unless noted.  Review of Systems   Musculoskeletal:  Positive for arthralgias and joint swelling.   All other systems reviewed and are negative.      Past Medical History:   Diagnosis Date    Acne vulgaris     Chronic back pain     GERD (gastroesophageal reflux disease)     PTSD (post-traumatic stress disorder)        Allergies:    Shrimp and Penicillins      History reviewed. No pertinent surgical history.      Social History     Socioeconomic History    Marital status:    Tobacco Use    Smoking status: Former     Types: Cigarettes    Smokeless tobacco: Current     Types: Snuff   Vaping Use    Vaping status: Never Used   Substance and Sexual Activity    Alcohol use: No    Drug use: Never    Sexual activity: Defer         History reviewed. No pertinent family history.    Objective  Physical Exam:  /86 (BP Location: Right arm, Patient Position: Sitting)   Pulse 69   Temp 98 °F (36.7 °C) (Oral)   Resp 18   Ht 172.7 cm (68\")   Wt 95.3 kg (210 lb)   SpO2 97%   BMI 31.93 kg/m²      Physical Exam  Vitals and nursing note reviewed.   Constitutional:       General: He is not in acute distress.     Appearance: " Normal appearance. He is normal weight. He is not ill-appearing, toxic-appearing or diaphoretic.   HENT:      Head: Normocephalic and atraumatic.      Nose: Nose normal.      Mouth/Throat:      Mouth: Mucous membranes are moist.      Pharynx: Oropharynx is clear.   Eyes:      Extraocular Movements: Extraocular movements intact.   Cardiovascular:      Pulses: Normal pulses.      Comments: Appears well-perfused  Pulmonary:      Effort: Pulmonary effort is normal. No respiratory distress.   Abdominal:      General: Abdomen is flat.   Musculoskeletal:         General: Swelling and tenderness present. No deformity.      Cervical back: Normal range of motion.   Skin:     General: Skin is warm and dry.      Capillary Refill: Capillary refill takes less than 2 seconds.   Neurological:      General: No focal deficit present.      Mental Status: He is alert and oriented to person, place, and time.   Psychiatric:         Mood and Affect: Mood normal.         Behavior: Behavior normal.         Thought Content: Thought content normal.         Judgment: Judgment normal.               Splint - Cast - Strapping    Date/Time: 6/2/2024 7:07 PM    Performed by: Rony Ritter PA-C  Authorized by: Delonte Sandoval MD    Consent:     Consent obtained:  Verbal    Consent given by:  Patient    Risks, benefits, and alternatives were discussed: yes      Risks discussed:  Discoloration, numbness, pain and swelling    Alternatives discussed:  No treatment  Universal protocol:     Procedure explained and questions answered to patient or proxy's satisfaction: yes      Patient identity confirmed:  Verbally with patient  Pre-procedure details:     Distal neurologic exam:  Normal    Distal perfusion: distal pulses strong and brisk capillary refill    Procedure details:     Location:  Wrist    Wrist location:  L wrist    Strapping: no      Supplies:  Fiberglass and cotton padding    Attestation: Splint applied and adjusted personally by  me    Post-procedure details:     Distal neurologic exam:  Unchanged    Distal perfusion: unchanged      Procedure completion:  Tolerated well, no immediate complications    Post-procedure imaging: reviewed    Nerve Block    Date/Time: 6/2/2024 7:38 PM    Performed by: Rony Ritter PA-C  Authorized by: Delonte Sandoval MD    Consent:     Consent obtained:  Verbal    Consent given by:  Patient    Risks, benefits, and alternatives were discussed: yes      Risks discussed:  Infection, bleeding, nerve damage, swelling, unsuccessful block and pain    Alternatives discussed:  Alternative treatment  Universal protocol:     Procedure explained and questions answered to patient or proxy's satisfaction: yes      Patient identity confirmed:  Verbally with patient  Indications:     Indications:  Pain relief and procedural anesthesia  Location:     Body area:  Upper extremity    Laterality:  Left  Pre-procedure details:     Skin preparation:  Chlorhexidine    Preparation: Patient was prepped and draped in usual sterile fashion    Skin anesthesia:     Skin anesthesia method:  Local infiltration    Local anesthetic:  Lidocaine 1% w/o epi  Procedure details:     Block needle gauge:  22 G    Anesthetic injected:  Lidocaine 1% w/o epi    Steroid injected:  None    Additive injected:  None    Injection procedure:  Anatomic landmarks identified, incremental injection, introduced needle and anatomic landmarks palpated    Paresthesia:  None  Post-procedure details:     Dressing:  None    Outcome:  Anesthesia achieved (pain improved)    Procedure completion:  Tolerated well, no immediate complications  Comments:      Hematoma block was used for this procedure, area was cleansed first with alcohol swab, 22-gauge needle was inserted under the bone, positive aspiration for blood obtained, at this area of fracture, 10 mL of lidocaine was injected to perform hematoma block for reduction.  Pain did improve as noted above.       ED  Course:         Lab Results (last 24 hours)       ** No results found for the last 24 hours. **             No radiology results from the last 24 hrs       MDM      Initial impression of presenting illness: This is a 53-year-old male present emergency room today for evaluation of left arm injury while skateboarding    DDX: includes but is not limited to: Fracture dislocation strain sprain contusion others    Patient arrives hemodynamically stable, hypertensive at 165/103 likely response to pain, afebrile, with vitals interpreted by myself.     Pertinent features from physical exam: Swelling tenderness to the left wrist with associated abrasion at the distal aspect, palmar side of the lateral left wrist.  Associated tenderness and swelling to this region.  2+ radial pulses.  No tenderness to palpation of the digits or the hand.  Head atraumatic, no cervical thoracic or lumbar spine tenderness.  He is ambulatory..    Initial diagnostic plan: Plan film imaging of the left wrist and left forearm    Results from initial plan were reviewed and interpreted by me revealing comminuted, impacted, distal radial fracture as interpreted by myself.  Same findings are interpreted by myself on plain film of the forearm left.  Repeat plain film by me does not reveal significant improvement of angulation or displacement of the distal radius.    Diagnostic information from other sources: Record was reviewed.     Interventions / Re-evaluation: Norco ice position of comfort.  Area was cleansed, hematoma block was performed by myself..  Manipulation performed by attending physician.  Patient was splinted by myself with sugar-tong splint.  Given sling.  Neurovascular intact pre and post procedure.  Given Ancef given that he did have a very small laceration.  There was no indication for closure of the small abrasion to the lateral aspect given that it did not directly overlie the fracture.  Placed in sling and stable for discharge.  Pain  controlled at discharge    Results/clinical rationale were discussed with patient at bedside.  He is agreeable to follow-up with orthopedics.    Consultations/Discussion of results with other physicians: Discussed with on-call orthopedics, Dr. Wilde, recommended follow-up in office tomorrow.     Disposition plan: Discharge.  Keflex 4 times daily, return precautions were discussed with patient at bedside.  Follow-up with orthopedics in office tomorrow.  Norco was sent for pain control.  Recommended other conservative measures such as Tylenol and Motrin as well as ice directly to the splint.  -----    Final diagnoses:   Fall, initial encounter   Type I or II open fracture of distal end of left radius, unspecified fracture morphology, initial encounter          Rony Ritter PA-C  06/02/24 1941

## 2024-06-02 NOTE — Clinical Note
[facility] EMERGENCY DEPARTMENT  [address] [address]  Phone: [phone]    Evelio Gonzalez was seen and treated in our emergency department on 6/2/2024.  He may return to work on 06/07/2024.         Thank you for choosing [facility].    Rony Ritter PA-C

## 2024-06-02 NOTE — DISCHARGE INSTRUCTIONS
Follow-up with orthopedics tomorrow, their numbers been attached, call to schedule an urgent appointment.  Do not get the splint wet, keep clean and away from dirt.  Tylenol and Motrin as well as pain medicine that has been sent to your pharmacy.  Do not take more than 4000 mg combined of Tylenol between your pain medicine and Tylenol at home.  You can ice directly through the splint but do not get it wet.  I have sent antibiotics that you should take as directed to your pharmacy.

## 2024-06-03 NOTE — ED PROVIDER NOTES
FX Dislocation    Date/Time: 6/2/2024 8:15 PM    Performed by: Delonte Sandoval MD  Authorized by: Delonte Sandoval MD    Consent:     Consent obtained:  Verbal    Consent given by:  Patient    Risks discussed:  Nerve damage, pain and vascular damage    Alternatives discussed:  No treatment  Injury:     Injury location:  Forearm    Forearm injury location:  L forearm    Forearm fracture type: distal radial    Pre-procedure details:     Distal neurologic exam:  Normal    Distal perfusion: distal pulses strong      Range of motion: normal    Sedation:     Sedation type:  None  Anesthesia:     Anesthesia method:  Local infiltration    Local anesthetic:  Lidocaine 1% w/o epi  Procedure details:     Manipulation performed: yes      Skin traction used: no      Skeletal traction used: no      Pin inserted: no      Reduction successful: no      X-ray confirmed reduction: yes      Immobilization:  Splint    Splint type:  Sugar tong    Supplies used:  Cotton padding and fiberglass  Post-procedure details:     Distal neurologic exam:  Normal    Distal perfusion: distal pulses strong      Range of motion: normal      Procedure completion:  Tolerated well, no immediate complications         Delonte Sandoval MD  06/02/24 2023

## 2024-06-04 ENCOUNTER — HOSPITAL ENCOUNTER (OUTPATIENT)
Dept: CARDIOLOGY | Facility: HOSPITAL | Age: 54
Discharge: HOME | End: 2024-06-04
Payer: COMMERCIAL

## 2024-06-04 ENCOUNTER — TRANSCRIBE ORDERS (OUTPATIENT)
Dept: LAB | Facility: HOSPITAL | Age: 54
End: 2024-06-04
Payer: COMMERCIAL

## 2024-06-04 ENCOUNTER — HOSPITAL ENCOUNTER (OUTPATIENT)
Dept: GENERAL RADIOLOGY | Facility: HOSPITAL | Age: 54
Discharge: HOME | End: 2024-06-04
Payer: COMMERCIAL

## 2024-06-04 ENCOUNTER — LAB (OUTPATIENT)
Dept: LAB | Facility: HOSPITAL | Age: 54
End: 2024-06-04
Payer: COMMERCIAL

## 2024-06-04 DIAGNOSIS — S62.132A: ICD-10-CM

## 2024-06-04 DIAGNOSIS — S62.132A: Primary | ICD-10-CM

## 2024-06-04 LAB
APTT PPP: 23.2 SECONDS (ref 23–35)
INR PPP: 0.95 (ref 0.9–1.1)
PROTHROMBIN TIME: 13.2 SECONDS (ref 12.3–15.1)

## 2024-06-04 PROCEDURE — 36415 COLL VENOUS BLD VENIPUNCTURE: CPT

## 2024-06-04 PROCEDURE — 93005 ELECTROCARDIOGRAM TRACING: CPT | Performed by: ORTHOPAEDIC SURGERY

## 2024-06-04 PROCEDURE — 80048 BASIC METABOLIC PNL TOTAL CA: CPT

## 2024-06-04 PROCEDURE — 85730 THROMBOPLASTIN TIME PARTIAL: CPT

## 2024-06-04 PROCEDURE — 85610 PROTHROMBIN TIME: CPT

## 2024-06-04 PROCEDURE — 71046 X-RAY EXAM CHEST 2 VIEWS: CPT

## 2024-06-04 PROCEDURE — 85025 COMPLETE CBC W/AUTO DIFF WBC: CPT

## 2024-06-04 PROCEDURE — 83036 HEMOGLOBIN GLYCOSYLATED A1C: CPT

## 2024-06-05 LAB
ANION GAP SERPL CALCULATED.3IONS-SCNC: 10 MMOL/L (ref 5–15)
BASOPHILS # BLD AUTO: 0.07 10*3/MM3 (ref 0–0.2)
BASOPHILS NFR BLD AUTO: 0.8 % (ref 0–1.5)
BUN SERPL-MCNC: 11 MG/DL (ref 6–20)
BUN/CREAT SERPL: 11.1 (ref 7–25)
CALCIUM SPEC-SCNC: 9.9 MG/DL (ref 8.6–10.5)
CHLORIDE SERPL-SCNC: 102 MMOL/L (ref 98–107)
CO2 SERPL-SCNC: 26 MMOL/L (ref 22–29)
CREAT SERPL-MCNC: 0.99 MG/DL (ref 0.76–1.27)
DEPRECATED RDW RBC AUTO: 40.5 FL (ref 37–54)
EGFRCR SERPLBLD CKD-EPI 2021: 90 ML/MIN/1.73
EOSINOPHIL # BLD AUTO: 0.6 10*3/MM3 (ref 0–0.4)
EOSINOPHIL NFR BLD AUTO: 6.5 % (ref 0.3–6.2)
ERYTHROCYTE [DISTWIDTH] IN BLOOD BY AUTOMATED COUNT: 12.5 % (ref 12.3–15.4)
GLUCOSE SERPL-MCNC: 81 MG/DL (ref 65–99)
HBA1C MFR BLD: 5.7 % (ref 4.8–5.6)
HCT VFR BLD AUTO: 42.9 % (ref 37.5–51)
HGB BLD-MCNC: 14.7 G/DL (ref 13–17.7)
IMM GRANULOCYTES # BLD AUTO: 0.02 10*3/MM3 (ref 0–0.05)
IMM GRANULOCYTES NFR BLD AUTO: 0.2 % (ref 0–0.5)
LYMPHOCYTES # BLD AUTO: 3.28 10*3/MM3 (ref 0.7–3.1)
LYMPHOCYTES NFR BLD AUTO: 35.4 % (ref 19.6–45.3)
MCH RBC QN AUTO: 30.7 PG (ref 26.6–33)
MCHC RBC AUTO-ENTMCNC: 34.3 G/DL (ref 31.5–35.7)
MCV RBC AUTO: 89.6 FL (ref 79–97)
MONOCYTES # BLD AUTO: 0.81 10*3/MM3 (ref 0.1–0.9)
MONOCYTES NFR BLD AUTO: 8.7 % (ref 5–12)
NEUTROPHILS NFR BLD AUTO: 4.48 10*3/MM3 (ref 1.7–7)
NEUTROPHILS NFR BLD AUTO: 48.4 % (ref 42.7–76)
NRBC BLD AUTO-RTO: 0 /100 WBC (ref 0–0.2)
PLATELET # BLD AUTO: 269 10*3/MM3 (ref 140–450)
PMV BLD AUTO: 11 FL (ref 6–12)
POTASSIUM SERPL-SCNC: 4.3 MMOL/L (ref 3.5–5.2)
QT INTERVAL: 372 MS
QTC INTERVAL: 412 MS
RBC # BLD AUTO: 4.79 10*6/MM3 (ref 4.14–5.8)
SODIUM SERPL-SCNC: 138 MMOL/L (ref 136–145)
WBC NRBC COR # BLD AUTO: 9.26 10*3/MM3 (ref 3.4–10.8)

## 2024-07-07 ENCOUNTER — APPOINTMENT (OUTPATIENT)
Dept: GENERAL RADIOLOGY | Facility: HOSPITAL | Age: 54
End: 2024-07-07
Payer: COMMERCIAL

## 2024-07-07 ENCOUNTER — HOSPITAL ENCOUNTER (EMERGENCY)
Facility: HOSPITAL | Age: 54
Discharge: HOME | End: 2024-07-07
Attending: STUDENT IN AN ORGANIZED HEALTH CARE EDUCATION/TRAINING PROGRAM | Admitting: STUDENT IN AN ORGANIZED HEALTH CARE EDUCATION/TRAINING PROGRAM
Payer: COMMERCIAL

## 2024-07-07 VITALS
DIASTOLIC BLOOD PRESSURE: 82 MMHG | BODY MASS INDEX: 33.34 KG/M2 | HEART RATE: 60 BPM | WEIGHT: 220 LBS | SYSTOLIC BLOOD PRESSURE: 127 MMHG | HEIGHT: 68 IN | TEMPERATURE: 98.1 F | RESPIRATION RATE: 18 BRPM | OXYGEN SATURATION: 97 %

## 2024-07-07 DIAGNOSIS — R07.9 CHEST PAIN, UNSPECIFIED TYPE: Primary | ICD-10-CM

## 2024-07-07 LAB
ALBUMIN SERPL-MCNC: 4.3 G/DL (ref 3.5–5.2)
ALBUMIN/GLOB SERPL: 1.5 G/DL
ALP SERPL-CCNC: 53 U/L (ref 39–117)
ALT SERPL W P-5'-P-CCNC: 26 U/L (ref 1–41)
ANION GAP SERPL CALCULATED.3IONS-SCNC: 12.2 MMOL/L (ref 5–15)
AST SERPL-CCNC: 19 U/L (ref 1–40)
BASOPHILS # BLD AUTO: 0.07 10*3/MM3 (ref 0–0.2)
BASOPHILS NFR BLD AUTO: 0.7 % (ref 0–1.5)
BILIRUB SERPL-MCNC: 0.3 MG/DL (ref 0–1.2)
BUN SERPL-MCNC: 9 MG/DL (ref 6–20)
BUN/CREAT SERPL: 9.3 (ref 7–25)
CALCIUM SPEC-SCNC: 9.6 MG/DL (ref 8.6–10.5)
CHLORIDE SERPL-SCNC: 101 MMOL/L (ref 98–107)
CO2 SERPL-SCNC: 24.8 MMOL/L (ref 22–29)
CREAT SERPL-MCNC: 0.97 MG/DL (ref 0.76–1.27)
DEPRECATED RDW RBC AUTO: 40.9 FL (ref 37–54)
EGFRCR SERPLBLD CKD-EPI 2021: 92.2 ML/MIN/1.73
EOSINOPHIL # BLD AUTO: 0.81 10*3/MM3 (ref 0–0.4)
EOSINOPHIL NFR BLD AUTO: 7.6 % (ref 0.3–6.2)
ERYTHROCYTE [DISTWIDTH] IN BLOOD BY AUTOMATED COUNT: 12.6 % (ref 12.3–15.4)
GEN 5 2HR TROPONIN T REFLEX: <6 NG/L
GLOBULIN UR ELPH-MCNC: 2.8 GM/DL
GLUCOSE SERPL-MCNC: 106 MG/DL (ref 65–99)
HCT VFR BLD AUTO: 38.8 % (ref 37.5–51)
HGB BLD-MCNC: 13.6 G/DL (ref 13–17.7)
HOLD SPECIMEN: NORMAL
HOLD SPECIMEN: NORMAL
IMM GRANULOCYTES # BLD AUTO: 0.02 10*3/MM3 (ref 0–0.05)
IMM GRANULOCYTES NFR BLD AUTO: 0.2 % (ref 0–0.5)
LYMPHOCYTES # BLD AUTO: 3.97 10*3/MM3 (ref 0.7–3.1)
LYMPHOCYTES NFR BLD AUTO: 37.1 % (ref 19.6–45.3)
MCH RBC QN AUTO: 31 PG (ref 26.6–33)
MCHC RBC AUTO-ENTMCNC: 35.1 G/DL (ref 31.5–35.7)
MCV RBC AUTO: 88.4 FL (ref 79–97)
MONOCYTES # BLD AUTO: 0.9 10*3/MM3 (ref 0.1–0.9)
MONOCYTES NFR BLD AUTO: 8.4 % (ref 5–12)
NEUTROPHILS NFR BLD AUTO: 4.92 10*3/MM3 (ref 1.7–7)
NEUTROPHILS NFR BLD AUTO: 46 % (ref 42.7–76)
NRBC BLD AUTO-RTO: 0 /100 WBC (ref 0–0.2)
PLATELET # BLD AUTO: 259 10*3/MM3 (ref 140–450)
PMV BLD AUTO: 10.5 FL (ref 6–12)
POTASSIUM SERPL-SCNC: 4.1 MMOL/L (ref 3.5–5.2)
PROT SERPL-MCNC: 7.1 G/DL (ref 6–8.5)
RBC # BLD AUTO: 4.39 10*6/MM3 (ref 4.14–5.8)
SODIUM SERPL-SCNC: 138 MMOL/L (ref 136–145)
TROPONIN T DELTA: NORMAL
TROPONIN T SERPL HS-MCNC: <6 NG/L
WBC NRBC COR # BLD AUTO: 10.69 10*3/MM3 (ref 3.4–10.8)
WHOLE BLOOD HOLD COAG: NORMAL
WHOLE BLOOD HOLD SPECIMEN: NORMAL

## 2024-07-07 PROCEDURE — 84484 ASSAY OF TROPONIN QUANT: CPT | Performed by: STUDENT IN AN ORGANIZED HEALTH CARE EDUCATION/TRAINING PROGRAM

## 2024-07-07 PROCEDURE — 93005 ELECTROCARDIOGRAM TRACING: CPT

## 2024-07-07 PROCEDURE — 99284 EMERGENCY DEPT VISIT MOD MDM: CPT

## 2024-07-07 PROCEDURE — 36415 COLL VENOUS BLD VENIPUNCTURE: CPT

## 2024-07-07 PROCEDURE — 71045 X-RAY EXAM CHEST 1 VIEW: CPT

## 2024-07-07 PROCEDURE — 85025 COMPLETE CBC W/AUTO DIFF WBC: CPT | Performed by: STUDENT IN AN ORGANIZED HEALTH CARE EDUCATION/TRAINING PROGRAM

## 2024-07-07 PROCEDURE — 80053 COMPREHEN METABOLIC PANEL: CPT | Performed by: STUDENT IN AN ORGANIZED HEALTH CARE EDUCATION/TRAINING PROGRAM

## 2024-07-07 PROCEDURE — 93005 ELECTROCARDIOGRAM TRACING: CPT | Performed by: STUDENT IN AN ORGANIZED HEALTH CARE EDUCATION/TRAINING PROGRAM

## 2024-07-07 RX ORDER — ASPIRIN 81 MG/1
243 TABLET, CHEWABLE ORAL DAILY
Status: DISCONTINUED | OUTPATIENT
Start: 2024-07-07 | End: 2024-07-07 | Stop reason: HOSPADM

## 2024-07-07 RX ORDER — ASPIRIN 81 MG/1
241 TABLET, CHEWABLE ORAL DAILY
Status: DISCONTINUED | OUTPATIENT
Start: 2024-07-07 | End: 2024-07-07

## 2024-07-07 RX ORDER — SODIUM CHLORIDE 0.9 % (FLUSH) 0.9 %
10 SYRINGE (ML) INJECTION AS NEEDED
Status: DISCONTINUED | OUTPATIENT
Start: 2024-07-07 | End: 2024-07-07 | Stop reason: HOSPADM

## 2024-07-07 RX ORDER — ASPIRIN 325 MG
325 TABLET ORAL ONCE
Status: DISCONTINUED | OUTPATIENT
Start: 2024-07-07 | End: 2024-07-07

## 2024-07-07 RX ADMIN — ASPIRIN 81 MG CHEWABLE TABLET 243 MG: 81 TABLET CHEWABLE at 18:07

## 2024-07-07 NOTE — ED PROVIDER NOTES
EMERGENCY DEPARTMENT ENCOUNTER    Pt Name: Evelio Gonzalez  MRN: [id number]  Pt :   1970  Room Number:    Date of encounter:  2024  PCP: Eloy Matta PA  ED Provider: Jose Mims PA-C    Historian: Patient      HPI:  Chief Complaint   Patient presents with    Chest Pain          Context: Evelio Gonzalez is a 53 y.o. male who presents to the ED c/o left-sided chest pain.  Patient states acutely 30 minutes prior to arrival had onset of sharp stabbing left-sided chest pain located below the left nipple while watching TV.  The pain was initially sharp and stabbing, now is a dull aching and significantly improved.  No associated shortness of breath nausea or diaphoresis.  States he had a normal nuclear stress test 7 to 8 months ago.  Smoked but stopped 20 years ago.  Took an 81 mg aspirin shortly after onset and after couple minutes pain started to subside.  The pain did not radiate.  It is not pleuritic in nature.  No known injury.  Nothing makes pain worse.      PAST MEDICAL HISTORY  Past Medical History:   Diagnosis Date    Acne vulgaris     Chronic back pain     GERD (gastroesophageal reflux disease)     PTSD (post-traumatic stress disorder)          PAST SURGICAL HISTORY  History reviewed. No pertinent surgical history.      FAMILY HISTORY  History reviewed. No pertinent family history.      SOCIAL HISTORY  Social History     Socioeconomic History    Marital status:    Tobacco Use    Smoking status: Former     Types: Cigarettes    Smokeless tobacco: Current     Types: Snuff   Vaping Use    Vaping status: Never Used   Substance and Sexual Activity    Alcohol use: No    Drug use: Never    Sexual activity: Defer         ALLERGIES  Shrimp and Penicillins        REVIEW OF SYSTEMS  Review of Systems   Constitutional: Negative.    HENT: Negative.     Eyes: Negative.    Respiratory: Negative.  Negative for shortness of breath.    Cardiovascular: Negative.  Positive for chest pain.    Gastrointestinal: Negative.    Genitourinary: Negative.    Musculoskeletal: Negative.    Skin: Negative.    Allergic/Immunologic: Negative.    Neurological: Negative.    Psychiatric/Behavioral: Negative.     All other systems reviewed and are negative.       All systems reviewed and negative except for those discussed in HPI.       PHYSICAL EXAM    I have reviewed the triage vital signs and nursing notes.    ED Triage Vitals [07/07/24 1730]   Temp Heart Rate Resp BP SpO2   98.1 °F (36.7 °C) 70 18 141/85 99 %      Temp src Heart Rate Source Patient Position BP Location FiO2 (%)   Oral Monitor Sitting Left arm --       Physical Exam  Vitals and nursing note reviewed.   Constitutional:       General: He is not in acute distress.     Appearance: He is well-developed. He is not ill-appearing, toxic-appearing or diaphoretic.   HENT:      Head: Normocephalic and atraumatic.   Cardiovascular:      Rate and Rhythm: Normal rate and regular rhythm.      Heart sounds: Normal heart sounds.   Pulmonary:      Effort: Pulmonary effort is normal.      Breath sounds: Normal breath sounds.   Abdominal:      Palpations: Abdomen is soft.   Musculoskeletal:         General: Normal range of motion.      Cervical back: Normal range of motion.      Comments: Does have swelling to the left hand with an Ace wrap applied   Skin:     General: Skin is warm and dry.   Neurological:      General: No focal deficit present.      Mental Status: He is alert.   Psychiatric:         Mood and Affect: Mood normal.            LAB RESULTS  Recent Results (from the past 24 hour(s))   Comprehensive Metabolic Panel    Collection Time: 07/07/24  6:08 PM    Specimen: Blood   Result Value Ref Range    Glucose 106 (H) 65 - 99 mg/dL    BUN 9 6 - 20 mg/dL    Creatinine 0.97 0.76 - 1.27 mg/dL    Sodium 138 136 - 145 mmol/L    Potassium 4.1 3.5 - 5.2 mmol/L    Chloride 101 98 - 107 mmol/L    CO2 24.8 22.0 - 29.0 mmol/L    Calcium 9.6 8.6 - 10.5 mg/dL    Total  Protein 7.1 6.0 - 8.5 g/dL    Albumin 4.3 3.5 - 5.2 g/dL    ALT (SGPT) 26 1 - 41 U/L    AST (SGOT) 19 1 - 40 U/L    Alkaline Phosphatase 53 39 - 117 U/L    Total Bilirubin 0.3 0.0 - 1.2 mg/dL    Globulin 2.8 gm/dL    A/G Ratio 1.5 g/dL    BUN/Creatinine Ratio 9.3 7.0 - 25.0    Anion Gap 12.2 5.0 - 15.0 mmol/L    eGFR 92.2 >60.0 mL/min/1.73   High Sensitivity Troponin T    Collection Time: 07/07/24  6:08 PM    Specimen: Blood   Result Value Ref Range    HS Troponin T <6 <22 ng/L   Green Top (Gel)    Collection Time: 07/07/24  6:08 PM   Result Value Ref Range    Extra Tube Hold for add-ons.    Lavender Top    Collection Time: 07/07/24  6:08 PM   Result Value Ref Range    Extra Tube hold for add-on    Gold Top - SST    Collection Time: 07/07/24  6:08 PM   Result Value Ref Range    Extra Tube Hold for add-ons.    Light Blue Top    Collection Time: 07/07/24  6:08 PM   Result Value Ref Range    Extra Tube Hold for add-ons.    CBC Auto Differential    Collection Time: 07/07/24  6:08 PM    Specimen: Blood   Result Value Ref Range    WBC 10.69 3.40 - 10.80 10*3/mm3    RBC 4.39 4.14 - 5.80 10*6/mm3    Hemoglobin 13.6 13.0 - 17.7 g/dL    Hematocrit 38.8 37.5 - 51.0 %    MCV 88.4 79.0 - 97.0 fL    MCH 31.0 26.6 - 33.0 pg    MCHC 35.1 31.5 - 35.7 g/dL    RDW 12.6 12.3 - 15.4 %    RDW-SD 40.9 37.0 - 54.0 fl    MPV 10.5 6.0 - 12.0 fL    Platelets 259 140 - 450 10*3/mm3    Neutrophil % 46.0 42.7 - 76.0 %    Lymphocyte % 37.1 19.6 - 45.3 %    Monocyte % 8.4 5.0 - 12.0 %    Eosinophil % 7.6 (H) 0.3 - 6.2 %    Basophil % 0.7 0.0 - 1.5 %    Immature Grans % 0.2 0.0 - 0.5 %    Neutrophils, Absolute 4.92 1.70 - 7.00 10*3/mm3    Lymphocytes, Absolute 3.97 (H) 0.70 - 3.10 10*3/mm3    Monocytes, Absolute 0.90 0.10 - 0.90 10*3/mm3    Eosinophils, Absolute 0.81 (H) 0.00 - 0.40 10*3/mm3    Basophils, Absolute 0.07 0.00 - 0.20 10*3/mm3    Immature Grans, Absolute 0.02 0.00 - 0.05 10*3/mm3    nRBC 0.0 0.0 - 0.2 /100 WBC   High Sensitivity  Troponin T 2Hr    Collection Time: 07/07/24  8:17 PM    Specimen: Blood   Result Value Ref Range    HS Troponin T <6 <22 ng/L    Troponin T Delta         If labs were ordered, I independently reviewed the results and considered them in treating the patient.        RADIOLOGY  No Radiology Exams Resulted Within Past 24 Hours        PROCEDURES    Procedures    Interpretations    O2 Sat: The patients oxygen saturation was 99% on Room Air.  This was independently interpreted by me as Normal    Cardiac Monitoring: I reviewed and independently interpreted the Rhythm Strip as Normal Sinus rhythm rate of 59    Radiology: I ordered and independently reviewed the above noted radiographic studies.  I viewed images of Xray of the chest  which showed No pulmonary process per my independent interpretation. See radiologist's dictation for official interpretation.       MEDICATIONS GIVEN IN ER    Medications   sodium chloride 0.9 % flush 10 mL (has no administration in time range)   aspirin chewable tablet 243 mg (243 mg Oral Given 7/7/24 1807)         MEDICAL DECISION MAKING, PROGRESS, and CONSULTS    All labs, if obtained, have been independently reviewed by me.  All radiology studies, if obtained, have been reviewed by me and the radiologist dictating the report.  All EKG's, if obtained, have been independently viewed and interpreted by me      Discussion below represents my analysis of pertinent findings related to patient's condition, differential diagnosis, treatment plan and final disposition.    HEART score of 2    Differential diagnosis:    Musculoskeletal pain, noncardiac chest pain, ACS among others    Additional Sources:  None      Orders placed during this visit:  Orders Placed This Encounter   Procedures    XR Chest 1 View    [place] Draw    Comprehensive Metabolic Panel    High Sensitivity Troponin T    CBC Auto Differential    High Sensitivity Troponin T 2Hr    NPO Diet NPO Type: Strict NPO    Undress & Gown     Continuous Pulse Oximetry    Oxygen Therapy- Nasal Cannula; Titrate 1-6 LPM Per SpO2; 90 - 95%    ECG 12 Lead ED Triage Standing Order; Chest Pain    ECG 12 Lead ED Triage Standing Order; Chest Pain    Insert Peripheral IV    CBC & Differential    Green Top (Gel)    Lavender Top    Gold Top - SST    Light Blue Top         Additional orders considered but not ordered:  None    ED Course:    Consultants:  None    ED Course as of 07/07/24 2045   Sun Jul 07, 2024 1805 Patient states had to remove the cast he had on his left wrist recently due to swelling, follows back up with orthopedics on Friday of this week due to recent fracture in the left wrist. [TM]   1817 WBC: 10.69 [TM]   1817 Hemoglobin: 13.6 [TM]   1817 Hematocrit: 38.8 [TM]   1840 HS Troponin T: <6 [TM]   1854 EKG interpreted by me, normal sinus rhythm with no concerning ST changes noted, rate of 71 [JE]   1921 Review of records shows patient had a normal ECG stress test September 2022. [TM]      ED Course User Index  [JE] Rudy Michelle MD  [TM] Jose Mims PA-C           After my consideration of clinical presentation and any laboratory/radiology studies obtained, I discussed the findings with the patient/patient representative who is in agreement with the treatment plan and the final disposition. Risks and benefits of discharge were discussed.     AS OF 20:45 EDT VITALS:    BP - 127/82  HR - 56  TEMP - 98.1 °F (36.7 °C) (Oral)  O2 SATS - 99%    I reviewed the patients prescription monitoring report if available prior to discharge    DIAGNOSIS  Final diagnoses:   Chest pain, unspecified type         DISPOSITION  ED Disposition       ED Disposition   Discharge    Condition   Stable    Comment   --                   Please note that portions of this document were completed with voice recognition software.        Jose Mims PA-C  07/07/24 2045

## 2025-02-19 ENCOUNTER — APPOINTMENT (OUTPATIENT)
Dept: GENERAL RADIOLOGY | Facility: HOSPITAL | Age: 55
End: 2025-02-19
Payer: COMMERCIAL

## 2025-02-19 ENCOUNTER — HOSPITAL ENCOUNTER (EMERGENCY)
Facility: HOSPITAL | Age: 55
Discharge: HOME | End: 2025-02-19
Attending: STUDENT IN AN ORGANIZED HEALTH CARE EDUCATION/TRAINING PROGRAM | Admitting: STUDENT IN AN ORGANIZED HEALTH CARE EDUCATION/TRAINING PROGRAM
Payer: COMMERCIAL

## 2025-02-19 VITALS
WEIGHT: 220 LBS | DIASTOLIC BLOOD PRESSURE: 79 MMHG | HEIGHT: 68 IN | RESPIRATION RATE: 18 BRPM | TEMPERATURE: 98.4 F | SYSTOLIC BLOOD PRESSURE: 126 MMHG | BODY MASS INDEX: 33.34 KG/M2 | HEART RATE: 65 BPM | OXYGEN SATURATION: 98 %

## 2025-02-19 DIAGNOSIS — K27.9 PEPTIC ULCER DISEASE: ICD-10-CM

## 2025-02-19 DIAGNOSIS — R07.9 CHEST PAIN, UNSPECIFIED TYPE: Primary | ICD-10-CM

## 2025-02-19 LAB
ALBUMIN SERPL-MCNC: 4.6 G/DL (ref 3.5–5.2)
ALBUMIN/GLOB SERPL: 1.5 G/DL
ALP SERPL-CCNC: 49 U/L (ref 39–117)
ALT SERPL W P-5'-P-CCNC: 18 U/L (ref 1–41)
ANION GAP SERPL CALCULATED.3IONS-SCNC: 10.4 MMOL/L (ref 5–15)
AST SERPL-CCNC: 15 U/L (ref 1–40)
BASOPHILS # BLD AUTO: 0.05 10*3/MM3 (ref 0–0.2)
BASOPHILS NFR BLD AUTO: 0.6 % (ref 0–1.5)
BILIRUB SERPL-MCNC: 0.5 MG/DL (ref 0–1.2)
BUN SERPL-MCNC: 10 MG/DL (ref 6–20)
BUN/CREAT SERPL: 10.6 (ref 7–25)
CALCIUM SPEC-SCNC: 10 MG/DL (ref 8.6–10.5)
CHLORIDE SERPL-SCNC: 101 MMOL/L (ref 98–107)
CO2 SERPL-SCNC: 27.6 MMOL/L (ref 22–29)
CREAT SERPL-MCNC: 0.94 MG/DL (ref 0.76–1.27)
CRP SERPL-MCNC: 0.46 MG/DL (ref 0–0.5)
DEPRECATED RDW RBC AUTO: 40 FL (ref 37–54)
EGFRCR SERPLBLD CKD-EPI 2021: 95.1 ML/MIN/1.73
EOSINOPHIL # BLD AUTO: 0.53 10*3/MM3 (ref 0–0.4)
EOSINOPHIL NFR BLD AUTO: 6.2 % (ref 0.3–6.2)
ERYTHROCYTE [DISTWIDTH] IN BLOOD BY AUTOMATED COUNT: 12.4 % (ref 12.3–15.4)
FLUAV SUBTYP SPEC NAA+PROBE: NOT DETECTED
FLUBV RNA ISLT QL NAA+PROBE: NOT DETECTED
GEN 5 1HR TROPONIN T REFLEX: <6 NG/L
GLOBULIN UR ELPH-MCNC: 3 GM/DL
GLUCOSE SERPL-MCNC: 90 MG/DL (ref 65–99)
HCT VFR BLD AUTO: 42.6 % (ref 37.5–51)
HGB BLD-MCNC: 14.7 G/DL (ref 13–17.7)
IMM GRANULOCYTES # BLD AUTO: 0.02 10*3/MM3 (ref 0–0.05)
IMM GRANULOCYTES NFR BLD AUTO: 0.2 % (ref 0–0.5)
LYMPHOCYTES # BLD AUTO: 3.62 10*3/MM3 (ref 0.7–3.1)
LYMPHOCYTES NFR BLD AUTO: 42.3 % (ref 19.6–45.3)
MAGNESIUM SERPL-MCNC: 2.1 MG/DL (ref 1.6–2.6)
MCH RBC QN AUTO: 30.4 PG (ref 26.6–33)
MCHC RBC AUTO-ENTMCNC: 34.5 G/DL (ref 31.5–35.7)
MCV RBC AUTO: 88 FL (ref 79–97)
MONOCYTES # BLD AUTO: 0.58 10*3/MM3 (ref 0.1–0.9)
MONOCYTES NFR BLD AUTO: 6.8 % (ref 5–12)
NEUTROPHILS NFR BLD AUTO: 3.76 10*3/MM3 (ref 1.7–7)
NEUTROPHILS NFR BLD AUTO: 43.9 % (ref 42.7–76)
NRBC BLD AUTO-RTO: 0 /100 WBC (ref 0–0.2)
NT-PROBNP SERPL-MCNC: <36 PG/ML (ref 0–900)
PLATELET # BLD AUTO: 261 10*3/MM3 (ref 140–450)
PMV BLD AUTO: 10.7 FL (ref 6–12)
POTASSIUM SERPL-SCNC: 4.1 MMOL/L (ref 3.5–5.2)
PROCALCITONIN SERPL-MCNC: 0.03 NG/ML (ref 0–0.25)
PROT SERPL-MCNC: 7.6 G/DL (ref 6–8.5)
RBC # BLD AUTO: 4.84 10*6/MM3 (ref 4.14–5.8)
SARS-COV-2 RNA RESP QL NAA+PROBE: NOT DETECTED
SODIUM SERPL-SCNC: 139 MMOL/L (ref 136–145)
TROPONIN T NUMERIC DELTA: NORMAL
TROPONIN T SERPL HS-MCNC: <6 NG/L
WBC NRBC COR # BLD AUTO: 8.56 10*3/MM3 (ref 3.4–10.8)

## 2025-02-19 PROCEDURE — 83735 ASSAY OF MAGNESIUM: CPT | Performed by: STUDENT IN AN ORGANIZED HEALTH CARE EDUCATION/TRAINING PROGRAM

## 2025-02-19 PROCEDURE — 80053 COMPREHEN METABOLIC PANEL: CPT | Performed by: STUDENT IN AN ORGANIZED HEALTH CARE EDUCATION/TRAINING PROGRAM

## 2025-02-19 PROCEDURE — 85025 COMPLETE CBC W/AUTO DIFF WBC: CPT | Performed by: STUDENT IN AN ORGANIZED HEALTH CARE EDUCATION/TRAINING PROGRAM

## 2025-02-19 PROCEDURE — 93005 ELECTROCARDIOGRAM TRACING: CPT | Performed by: STUDENT IN AN ORGANIZED HEALTH CARE EDUCATION/TRAINING PROGRAM

## 2025-02-19 PROCEDURE — 83880 ASSAY OF NATRIURETIC PEPTIDE: CPT | Performed by: STUDENT IN AN ORGANIZED HEALTH CARE EDUCATION/TRAINING PROGRAM

## 2025-02-19 PROCEDURE — 25810000003 SODIUM CHLORIDE 0.9 % SOLUTION: Performed by: STUDENT IN AN ORGANIZED HEALTH CARE EDUCATION/TRAINING PROGRAM

## 2025-02-19 PROCEDURE — 84484 ASSAY OF TROPONIN QUANT: CPT | Performed by: STUDENT IN AN ORGANIZED HEALTH CARE EDUCATION/TRAINING PROGRAM

## 2025-02-19 PROCEDURE — 36415 COLL VENOUS BLD VENIPUNCTURE: CPT

## 2025-02-19 PROCEDURE — 71045 X-RAY EXAM CHEST 1 VIEW: CPT

## 2025-02-19 PROCEDURE — 84145 PROCALCITONIN (PCT): CPT | Performed by: STUDENT IN AN ORGANIZED HEALTH CARE EDUCATION/TRAINING PROGRAM

## 2025-02-19 PROCEDURE — 86140 C-REACTIVE PROTEIN: CPT | Performed by: STUDENT IN AN ORGANIZED HEALTH CARE EDUCATION/TRAINING PROGRAM

## 2025-02-19 PROCEDURE — 87636 SARSCOV2 & INF A&B AMP PRB: CPT | Performed by: STUDENT IN AN ORGANIZED HEALTH CARE EDUCATION/TRAINING PROGRAM

## 2025-02-19 PROCEDURE — 99284 EMERGENCY DEPT VISIT MOD MDM: CPT | Performed by: STUDENT IN AN ORGANIZED HEALTH CARE EDUCATION/TRAINING PROGRAM

## 2025-02-19 RX ORDER — ONDANSETRON 2 MG/ML
4 INJECTION INTRAMUSCULAR; INTRAVENOUS ONCE
Status: DISCONTINUED | OUTPATIENT
Start: 2025-02-19 | End: 2025-02-19 | Stop reason: HOSPADM

## 2025-02-19 RX ORDER — OMEPRAZOLE 40 MG/1
40 CAPSULE, DELAYED RELEASE ORAL EVERY MORNING
Qty: 30 CAPSULE | Refills: 0 | Status: SHIPPED | OUTPATIENT
Start: 2025-02-19

## 2025-02-19 RX ADMIN — SODIUM CHLORIDE 1000 ML: 9 INJECTION, SOLUTION INTRAVENOUS at 15:15

## 2025-02-19 NOTE — DISCHARGE INSTRUCTIONS
You were evaluated for chest pain.  We got lab work and chest x-ray that all showed no concerns for any cardiac process.  You are now stable for discharge.  We have prescribed you an acid reducing medicine for your stomach given your concerns for peptic ulcer disease and have referred you to gastroenterology for consideration of the scope to look at your stomach.  If you have any severe increase in chest pain or abdominal pain, please come back to the to the emergency department for further evaluation.  You are now stable for discharge.

## 2025-02-19 NOTE — Clinical Note
[facility] EMERGENCY DEPARTMENT  [address] [place] [address]  Phone: [phone]    Evelio Gonzalez was seen and treated in our emergency department on 2/19/2025.  He may return to work on 02/22/2025.         Thank you for choosing [facility].    Rudy Michelle MD

## 2025-02-19 NOTE — ED PROVIDER NOTES
Subjective:  History of Present Illness:    Patient is a 54-year-old male with history of GERD, PTSD who presents today with substernal chest pain.  Reports onset of symptoms over the last 24 hours.  Denies any fevers.  No shortness of breath.  Denies any cough or congestion.  Nausea and vomiting prior to arrival.  Denies diarrhea.  No abdominal pain per his report.  Denies any new leg swelling or leg pain.  No personal history of PE/DVT.      Nurses Notes reviewed and agree, including vitals, allergies, social history and prior medical history.     REVIEW OF SYSTEMS: All systems reviewed and not pertinent unless noted.  Review of Systems   Constitutional:  Positive for activity change. Negative for appetite change, chills, fatigue and fever.   HENT:  Negative for congestion, sinus pressure, sneezing and trouble swallowing.    Eyes:  Negative for discharge and itching.   Respiratory:  Negative for cough and shortness of breath.    Cardiovascular:  Positive for chest pain. Negative for palpitations.   Gastrointestinal:  Positive for nausea. Negative for abdominal distention, abdominal pain, diarrhea and vomiting.   Endocrine: Negative for cold intolerance and heat intolerance.   Genitourinary:  Negative for decreased urine volume, dysuria and urgency.   Musculoskeletal:  Negative for gait problem, neck pain and neck stiffness.   Skin:  Negative for color change and rash.   Allergic/Immunologic: Negative for immunocompromised state.   Neurological:  Negative for facial asymmetry and headaches.   Hematological:  Negative for adenopathy.   Psychiatric/Behavioral:  Negative for self-injury and suicidal ideas.        Past Medical History:   Diagnosis Date    Acne vulgaris     Chronic back pain     GERD (gastroesophageal reflux disease)     PTSD (post-traumatic stress disorder)        Allergies:    Shrimp and Penicillins      History reviewed. No pertinent surgical history.      Social History     Socioeconomic History     "Marital status:    Tobacco Use    Smoking status: Former     Types: Cigarettes    Smokeless tobacco: Current     Types: Snuff   Vaping Use    Vaping status: Never Used   Substance and Sexual Activity    Alcohol use: No    Drug use: Never    Sexual activity: Defer         History reviewed. No pertinent family history.    Objective  Physical Exam:  /79   Pulse 65   Temp 98.4 °F (36.9 °C) (Oral)   Resp 18   Ht 172.7 cm (68\")   Wt 99.8 kg (220 lb)   SpO2 98%   BMI 33.45 kg/m²      Physical Exam  Constitutional:       General: He is not in acute distress.     Appearance: Normal appearance. He is normal weight. He is not ill-appearing.   HENT:      Head: Normocephalic and atraumatic.      Nose: Nose normal. No congestion or rhinorrhea.      Mouth/Throat:      Mouth: Mucous membranes are moist.      Pharynx: Oropharynx is clear.   Eyes:      Extraocular Movements: Extraocular movements intact.      Conjunctiva/sclera: Conjunctivae normal.      Pupils: Pupils are equal, round, and reactive to light.   Cardiovascular:      Rate and Rhythm: Normal rate and regular rhythm.      Pulses: Normal pulses.   Pulmonary:      Effort: Pulmonary effort is normal. No respiratory distress.      Breath sounds: Normal breath sounds.   Abdominal:      General: Abdomen is flat. Bowel sounds are normal. There is no distension.      Palpations: Abdomen is soft.      Tenderness: There is no abdominal tenderness.      Comments: Unreactive to abdominal palpation at the time of my exam   Musculoskeletal:         General: No swelling or tenderness. Normal range of motion.      Cervical back: Normal range of motion and neck supple. No rigidity or tenderness.   Skin:     General: Skin is warm and dry.      Capillary Refill: Capillary refill takes less than 2 seconds.   Neurological:      General: No focal deficit present.      Mental Status: He is alert and oriented to person, place, and time. Mental status is at baseline.      " Cranial Nerves: No cranial nerve deficit.      Sensory: No sensory deficit.      Motor: No weakness.   Psychiatric:         Mood and Affect: Mood normal.         Behavior: Behavior normal.         Thought Content: Thought content normal.         Judgment: Judgment normal.         Procedures    ED Course:    ED Course as of 02/19/25 1848 Wed Feb 19, 2025   1447 EKG interpreted by me, normal sinus rhythm no concerning ST changes noted, rate of 68 [JE]      ED Course User Index  [JE] Rudy Michelle MD       Lab Results (last 24 hours)       Procedure Component Value Units Date/Time    COVID-19 and FLU A/B PCR, 1 HR TAT - Swab, Nasopharynx [[id number]]  (Normal) Collected: 02/19/25 1449    Specimen: Swab from Nasopharynx Updated: 02/19/25 1517     COVID19 Not Detected     Influenza A PCR Not Detected     Influenza B PCR Not Detected    Narrative:      Fact sheet for providers: https://www.fda.gov/media/281772/download    Fact sheet for patients: https://www.fda.gov/media/191779/download    Test performed by PCR.    CBC & Differential [[id number]]  (Abnormal) Collected: 02/19/25 1503    Specimen: Blood Updated: 02/19/25 1514    Narrative:      The following orders were created for panel order CBC & Differential.  Procedure                               Abnormality         Status                     ---------                               -----------         ------                     CBC Auto Differential[[id number]]        Abnormal            Final result                 Please view results for these tests on the individual orders.    Comprehensive Metabolic Panel [[id number]] Collected: 02/19/25 1503    Specimen: Blood Updated: 02/19/25 1531     Glucose 90 mg/dL      BUN 10 mg/dL      Creatinine 0.94 mg/dL      Sodium 139 mmol/L      Potassium 4.1 mmol/L      Chloride 101 mmol/L      CO2 27.6 mmol/L      Calcium 10.0 mg/dL      Total Protein 7.6 g/dL      Albumin 4.6 g/dL      ALT (SGPT) 18 U/L      AST (SGOT) 15  U/L      Alkaline Phosphatase 49 U/L      Total Bilirubin 0.5 mg/dL      Globulin 3.0 gm/dL      A/G Ratio 1.5 g/dL      BUN/Creatinine Ratio 10.6     Anion Gap 10.4 mmol/L      eGFR 95.1 mL/min/1.73     Narrative:      GFR Categories in Chronic Kidney Disease (CKD)      GFR Category          GFR (mL/min/1.73)    Interpretation  G1                     90 or greater         Normal or high (1)  G2                      60-89                Mild decrease (1)  G3a                   45-59                Mild to moderate decrease  G3b                   30-44                Moderate to severe decrease  G4                    15-29                Severe decrease  G5                    14 or less           Kidney failure          (1)In the absence of evidence of kidney disease, neither GFR category G1 or G2 fulfill the criteria for CKD.    eGFR calculation 2021 CKD-EPI creatinine equation, which does not include race as a factor    High Sensitivity Troponin T [[id number]]  (Normal) Collected: 02/19/25 1503    Specimen: Blood Updated: 02/19/25 1535     HS Troponin T <6 ng/L     Narrative:      High Sensitive Troponin T Reference Range:  <14.0 ng/L- Negative Female for AMI  <22.0 ng/L- Negative Male for AMI  >=14 - Abnormal Female indicating possible myocardial injury.  >=22 - Abnormal Male indicating possible myocardial injury.   Clinicians would have to utilize clinical acumen, EKG, Troponin, and serial changes to determine if it is an Acute Myocardial Infarction or myocardial injury due to an underlying chronic condition.         BNP [[id number]]  (Normal) Collected: 02/19/25 1503    Specimen: Blood Updated: 02/19/25 1545     proBNP <36.0 pg/mL     Narrative:      This assay is used as an aid in the diagnosis of individuals suspected of having heart failure. It can be used as an aid in the diagnosis of acute decompensated heart failure (ADHF) in patients presenting with signs and symptoms of ADHF to the emergency department  "(ED). In addition, NT-proBNP of <300 pg/mL indicates ADHF is not likely.    Age Range Result Interpretation  NT-proBNP Concentration (pg/mL:      <50             Positive            >450                   Gray                 300-450                    Negative             <300    50-75           Positive            >900                  Gray                300-900                  Negative            <300      >75             Positive            >1800                  Gray                300-1800                  Negative            <300    C-reactive Protein [[id number]]  (Normal) Collected: 02/19/25 1503    Specimen: Blood Updated: 02/19/25 1531     C-Reactive Protein 0.46 mg/dL     Procalcitonin [[id number]]  (Normal) Collected: 02/19/25 1503    Specimen: Blood Updated: 02/19/25 1556     Procalcitonin 0.03 ng/mL     Narrative:      As a Marker for Sepsis (Non-Neonates):    1. <0.5 ng/mL represents a low risk of severe sepsis and/or septic shock.  2. >2 ng/mL represents a high risk of severe sepsis and/or septic shock.    As a Marker for Lower Respiratory Tract Infections that require antibiotic therapy:    PCT on Admission    Antibiotic Therapy       6-12 Hrs later    >0.5                Strongly Recommended  >0.25 - <0.5        Recommended   0.1 - 0.25          Discouraged              Remeasure/reassess PCT  <0.1                Strongly Discouraged     Remeasure/reassess PCT    As 28 day mortality risk marker: \"Change in Procalcitonin Result\" (>80% or <=80%) if Day 0 (or Day 1) and Day 4 values are available. Refer to http://www.[facility]-pct-calculator.com    Change in PCT <=80%  A decrease of PCT levels below or equal to 80% defines a positive change in PCT test result representing a higher risk for 28-day all-cause mortality of patients diagnosed with severe sepsis for septic shock.    Change in PCT >80%  A decrease of PCT levels of more than 80% defines a negative change in PCT result representing a lower risk " for 28-day all-cause mortality of patients diagnosed with severe sepsis or septic shock.       Magnesium [[id number]]  (Normal) Collected: 02/19/25 1503    Specimen: Blood Updated: 02/19/25 1531     Magnesium 2.1 mg/dL     CBC Auto Differential [[id number]]  (Abnormal) Collected: 02/19/25 1503    Specimen: Blood Updated: 02/19/25 1514     WBC 8.56 10*3/mm3      RBC 4.84 10*6/mm3      Hemoglobin 14.7 g/dL      Hematocrit 42.6 %      MCV 88.0 fL      MCH 30.4 pg      MCHC 34.5 g/dL      RDW 12.4 %      RDW-SD 40.0 fl      MPV 10.7 fL      Platelets 261 10*3/mm3      Neutrophil % 43.9 %      Lymphocyte % 42.3 %      Monocyte % 6.8 %      Eosinophil % 6.2 %      Basophil % 0.6 %      Immature Grans % 0.2 %      Neutrophils, Absolute 3.76 10*3/mm3      Lymphocytes, Absolute 3.62 10*3/mm3      Monocytes, Absolute 0.58 10*3/mm3      Eosinophils, Absolute 0.53 10*3/mm3      Basophils, Absolute 0.05 10*3/mm3      Immature Grans, Absolute 0.02 10*3/mm3      nRBC 0.0 /100 WBC     High Sensitivity Troponin T 1Hr [[id number]] Collected: 02/19/25 1623    Specimen: Blood Updated: 02/19/25 1644     HS Troponin T <6 ng/L      Troponin T Numeric Delta --     Comment: Unable to calculate.       Narrative:      High Sensitive Troponin T Reference Range:  <14.0 ng/L- Negative Female for AMI  <22.0 ng/L- Negative Male for AMI  >=14 - Abnormal Female indicating possible myocardial injury.  >=22 - Abnormal Male indicating possible myocardial injury.   Clinicians would have to utilize clinical acumen, EKG, Troponin, and serial changes to determine if it is an Acute Myocardial Infarction or myocardial injury due to an underlying chronic condition.                  XR Chest 1 View    Result Date: 2/19/2025  PROCEDURE: XR CHEST 1 VW-  HISTORY: Chest pain  COMPARISON: 7/7/2024  FINDINGS:  Portable view of the chest demonstrates the lungs to be grossly clear. There is no evidence of effusion, pneumothorax or other significant pleural disease. The  mediastinum is unremarkable.  The heart size is normal.      Impression: Unremarkable portable chest.    This report was signed and finalized on 2/19/2025 3:38 PM by Huy Mahajan MD.          [facility]      Initial impression of presenting illness: Chest pain    DDX: includes but is not limited to: ACS, MI come back to pneumonia, viral URI, GERD, peptic ulcer disease    Patient arrives stable with vitals interpreted by myself.     Pertinent features from physical exam: Clear to auscultation, regular rate and rhythm, no murmur, nontender to abdominal palpation.    Initial diagnostic plan: CBC, CMP, troponin, EKG, chest x-ray    Results from initial plan were reviewed and interpreted by me revealing no concern for acute cardiac process, no concern for pneumonia per my depend interpretation of chest x-ray prior to radiology overread    Diagnostic information from other sources: Reviewed past medical records    Interventions / Re-evaluation: [facility] emergency department for 2 hours no change in vital signs    Results/clinical rationale were discussed with patient at bedside    Consultations/Discussion of results with other physicians: Discussed negative cardiac workup in emergency department, no concern for acute cardiac process.  Patient endorsing recurrent GERD symptoms and is concerned that he may have peptic ulcer disease as his child had peptic ulcer disease and recently passed away from perforated gastric ulcer.  Given the patient's concerns I have prescribed him omeprazole and have referred him to gastroenterology for further evaluation of peptic ulcer disease.  Given strict turn precaution for severe increase in chest pain, shortness of breath or onset of significant abdominal pain.  He voices understanding and agreement of this plan.    Disposition plan: Discharge  -----        Final diagnoses:   Chest pain, unspecified type   Peptic ulcer disease          Rudy Michelle MD  02/19/25 3111

## 2025-03-28 ENCOUNTER — TELEPHONE (OUTPATIENT)
Dept: CARDIOLOGY | Facility: CLINIC | Age: 55
End: 2025-03-28
Payer: COMMERCIAL

## 2025-03-28 DIAGNOSIS — R00.1 SINUS BRADYCARDIA: Primary | ICD-10-CM

## 2025-03-28 NOTE — TELEPHONE ENCOUNTER
Caller: [email or web address]    Relationship: Emergency Contact    Best call back number: [phone]     What orders are you requesting (i.e. lab or imaging): HEART MONITOR     In what timeframe would the patient need to come in: N/A     Where will you receive your lab/imaging services: ON SELF     Additional notes: LAST SEEN 9/16/22, PN STATES Return in about 6 weeks (around 10/28/2022) for Follow-up with Dr. Lopez.    STATES HE WAS CALLED BY PCP AND TOLD THAT HE NEEDED TO HAVE A HEART MONITOR PUT ON, PLEASE ADVISE.

## 2025-04-06 ENCOUNTER — APPOINTMENT (OUTPATIENT)
Dept: GENERAL RADIOLOGY | Facility: HOSPITAL | Age: 55
End: 2025-04-06
Payer: COMMERCIAL

## 2025-04-06 ENCOUNTER — HOSPITAL ENCOUNTER (EMERGENCY)
Facility: HOSPITAL | Age: 55
Discharge: HOME | End: 2025-04-06
Attending: EMERGENCY MEDICINE | Admitting: EMERGENCY MEDICINE
Payer: COMMERCIAL

## 2025-04-06 VITALS
HEART RATE: 61 BPM | WEIGHT: 217 LBS | RESPIRATION RATE: 18 BRPM | SYSTOLIC BLOOD PRESSURE: 152 MMHG | DIASTOLIC BLOOD PRESSURE: 83 MMHG | BODY MASS INDEX: 32.14 KG/M2 | OXYGEN SATURATION: 100 % | HEIGHT: 69 IN | TEMPERATURE: 97.9 F

## 2025-04-06 DIAGNOSIS — R07.9 CHEST PAIN, UNSPECIFIED TYPE: Primary | ICD-10-CM

## 2025-04-06 LAB
ALBUMIN SERPL-MCNC: 4.4 G/DL (ref 3.5–5.2)
ALBUMIN/GLOB SERPL: 1.6 G/DL
ALP SERPL-CCNC: 46 U/L (ref 39–117)
ALT SERPL W P-5'-P-CCNC: 19 U/L (ref 1–41)
ANION GAP SERPL CALCULATED.3IONS-SCNC: 11.3 MMOL/L (ref 5–15)
AST SERPL-CCNC: 18 U/L (ref 1–40)
BASOPHILS # BLD AUTO: 0.06 10*3/MM3 (ref 0–0.2)
BASOPHILS NFR BLD AUTO: 0.7 % (ref 0–1.5)
BILIRUB SERPL-MCNC: 0.4 MG/DL (ref 0–1.2)
BUN SERPL-MCNC: 8 MG/DL (ref 6–20)
BUN/CREAT SERPL: 6.5 (ref 7–25)
CALCIUM SPEC-SCNC: 9.8 MG/DL (ref 8.6–10.5)
CHLORIDE SERPL-SCNC: 99 MMOL/L (ref 98–107)
CO2 SERPL-SCNC: 26.7 MMOL/L (ref 22–29)
CREAT SERPL-MCNC: 1.23 MG/DL (ref 0.76–1.27)
DEPRECATED RDW RBC AUTO: 40 FL (ref 37–54)
EGFRCR SERPLBLD CKD-EPI 2021: 68.9 ML/MIN/1.73
EOSINOPHIL # BLD AUTO: 0.44 10*3/MM3 (ref 0–0.4)
EOSINOPHIL NFR BLD AUTO: 5.5 % (ref 0.3–6.2)
ERYTHROCYTE [DISTWIDTH] IN BLOOD BY AUTOMATED COUNT: 12.6 % (ref 12.3–15.4)
GLOBULIN UR ELPH-MCNC: 2.8 GM/DL
GLUCOSE SERPL-MCNC: 138 MG/DL (ref 65–99)
HCT VFR BLD AUTO: 43.7 % (ref 37.5–51)
HGB BLD-MCNC: 15 G/DL (ref 13–17.7)
HOLD SPECIMEN: NORMAL
HOLD SPECIMEN: NORMAL
IMM GRANULOCYTES # BLD AUTO: 0.03 10*3/MM3 (ref 0–0.05)
IMM GRANULOCYTES NFR BLD AUTO: 0.4 % (ref 0–0.5)
LYMPHOCYTES # BLD AUTO: 2.88 10*3/MM3 (ref 0.7–3.1)
LYMPHOCYTES NFR BLD AUTO: 35.8 % (ref 19.6–45.3)
MCH RBC QN AUTO: 30.1 PG (ref 26.6–33)
MCHC RBC AUTO-ENTMCNC: 34.3 G/DL (ref 31.5–35.7)
MCV RBC AUTO: 87.8 FL (ref 79–97)
MONOCYTES # BLD AUTO: 0.45 10*3/MM3 (ref 0.1–0.9)
MONOCYTES NFR BLD AUTO: 5.6 % (ref 5–12)
NEUTROPHILS NFR BLD AUTO: 4.18 10*3/MM3 (ref 1.7–7)
NEUTROPHILS NFR BLD AUTO: 52 % (ref 42.7–76)
NRBC BLD AUTO-RTO: 0 /100 WBC (ref 0–0.2)
PLATELET # BLD AUTO: 239 10*3/MM3 (ref 140–450)
PMV BLD AUTO: 10.6 FL (ref 6–12)
POTASSIUM SERPL-SCNC: 4.5 MMOL/L (ref 3.5–5.2)
PROT SERPL-MCNC: 7.2 G/DL (ref 6–8.5)
RBC # BLD AUTO: 4.98 10*6/MM3 (ref 4.14–5.8)
SODIUM SERPL-SCNC: 137 MMOL/L (ref 136–145)
TROPONIN T SERPL HS-MCNC: <6 NG/L
WBC NRBC COR # BLD AUTO: 8.04 10*3/MM3 (ref 3.4–10.8)
WHOLE BLOOD HOLD COAG: NORMAL
WHOLE BLOOD HOLD SPECIMEN: NORMAL

## 2025-04-06 PROCEDURE — 99284 EMERGENCY DEPT VISIT MOD MDM: CPT | Performed by: EMERGENCY MEDICINE

## 2025-04-06 PROCEDURE — 84484 ASSAY OF TROPONIN QUANT: CPT | Performed by: EMERGENCY MEDICINE

## 2025-04-06 PROCEDURE — 80053 COMPREHEN METABOLIC PANEL: CPT | Performed by: EMERGENCY MEDICINE

## 2025-04-06 PROCEDURE — 85025 COMPLETE CBC W/AUTO DIFF WBC: CPT | Performed by: EMERGENCY MEDICINE

## 2025-04-06 PROCEDURE — 71045 X-RAY EXAM CHEST 1 VIEW: CPT

## 2025-04-06 PROCEDURE — 93005 ELECTROCARDIOGRAM TRACING: CPT | Performed by: EMERGENCY MEDICINE

## 2025-04-06 RX ORDER — SODIUM CHLORIDE 0.9 % (FLUSH) 0.9 %
10 SYRINGE (ML) INJECTION AS NEEDED
Status: DISCONTINUED | OUTPATIENT
Start: 2025-04-06 | End: 2025-04-06 | Stop reason: HOSPADM

## 2025-04-06 RX ORDER — ASPIRIN 325 MG
325 TABLET ORAL ONCE
Status: DISCONTINUED | OUTPATIENT
Start: 2025-04-06 | End: 2025-04-06 | Stop reason: HOSPADM

## 2025-04-06 NOTE — ED PROVIDER NOTES
"Subjective:  History of Present Illness:    Patient is a 54-year-old male without contributing health history.  Presents to the ER today for chest wall pain.  Reports that this has been present for 2 weeks.  Reports that he has been wearing a cardiac heart monitor and is awaiting results.  Denies shortness of breath.  Denies fever.  Denies OTC medication or home remedy.  Denies alleviating or exacerbating factors.    Nurses Notes reviewed and agree, including vitals, allergies, social history and prior medical history.     REVIEW OF SYSTEMS: All systems reviewed and not pertinent unless noted.  Review of Systems   Cardiovascular:  Positive for chest pain.   All other systems reviewed and are negative.      Past Medical History:   Diagnosis Date    Acne vulgaris     Chronic back pain     GERD (gastroesophageal reflux disease)     PTSD (post-traumatic stress disorder)        Allergies:    Shrimp and Penicillins      History reviewed. No pertinent surgical history.      Social History     Socioeconomic History    Marital status:    Tobacco Use    Smoking status: Former     Types: Cigarettes    Smokeless tobacco: Current     Types: Snuff   Vaping Use    Vaping status: Never Used   Substance and Sexual Activity    Alcohol use: No    Drug use: Never    Sexual activity: Defer         History reviewed. No pertinent family history.    Objective  Physical Exam:  /83 (BP Location: Left arm, Patient Position: Sitting)   Pulse 61   Temp 97.9 °F (36.6 °C) (Oral)   Resp 18   Ht 175.3 cm (69\")   Wt 98.4 kg (217 lb)   SpO2 100%   BMI 32.05 kg/m²      Physical Exam  Vitals and nursing note reviewed.   Constitutional:       Appearance: He is well-developed and normal weight.   HENT:      Head: Normocephalic and atraumatic.   Eyes:      Extraocular Movements: Extraocular movements intact.      Pupils: Pupils are equal, round, and reactive to light.   Cardiovascular:      Rate and Rhythm: Normal rate and regular " rhythm.      Heart sounds: Normal heart sounds.   Pulmonary:      Breath sounds: Normal breath sounds.   Abdominal:      General: Bowel sounds are normal.      Palpations: Abdomen is soft.   Musculoskeletal:         General: Normal range of motion.      Cervical back: Normal range of motion and neck supple.   Skin:     General: Skin is warm.      Capillary Refill: Capillary refill takes less than 2 seconds.   Neurological:      General: No focal deficit present.      Mental Status: He is alert and oriented to person, place, and time.   Psychiatric:         Mood and Affect: Mood normal.         Behavior: Behavior normal.         Procedures    ED Course:         Lab Results (last 24 hours)       Procedure Component Value Units Date/Time    CBC & Differential [[id number]]  (Abnormal) Collected: 04/06/25 1155    Specimen: Blood Updated: 04/06/25 1203    Narrative:      The following orders were created for panel order CBC & Differential.  Procedure                               Abnormality         Status                     ---------                               -----------         ------                     CBC Auto Differential[[id number]]        Abnormal            Final result                 Please view results for these tests on the individual orders.    Comprehensive Metabolic Panel [[id number]]  (Abnormal) Collected: 04/06/25 1155    Specimen: Blood Updated: 04/06/25 1224     Glucose 138 mg/dL      BUN 8 mg/dL      Creatinine 1.23 mg/dL      Sodium 137 mmol/L      Potassium 4.5 mmol/L      Comment: Slight hemolysis detected by analyzer. Result may be falsely elevated.        Chloride 99 mmol/L      CO2 26.7 mmol/L      Calcium 9.8 mg/dL      Total Protein 7.2 g/dL      Albumin 4.4 g/dL      ALT (SGPT) 19 U/L      AST (SGOT) 18 U/L      Comment: Slight hemolysis detected by analyzer. Result may be falsely elevated.        Alkaline Phosphatase 46 U/L      Total Bilirubin 0.4 mg/dL      Globulin 2.8 gm/dL      A/G  Ratio 1.6 g/dL      BUN/Creatinine Ratio 6.5     Anion Gap 11.3 mmol/L      eGFR 68.9 mL/min/1.73     Narrative:      GFR Categories in Chronic Kidney Disease (CKD)      GFR Category          GFR (mL/min/1.73)    Interpretation  G1                     90 or greater         Normal or high (1)  G2                      60-89                Mild decrease (1)  G3a                   45-59                Mild to moderate decrease  G3b                   30-44                Moderate to severe decrease  G4                    15-29                Severe decrease  G5                    14 or less           Kidney failure          (1)In the absence of evidence of kidney disease, neither GFR category G1 or G2 fulfill the criteria for CKD.    eGFR calculation 2021 CKD-EPI creatinine equation, which does not include race as a factor    High Sensitivity Troponin T [[id number]]  (Normal) Collected: 04/06/25 1155    Specimen: Blood Updated: 04/06/25 1221     HS Troponin T <6 ng/L     Narrative:      High Sensitive Troponin T Reference Range:  <14.0 ng/L- Negative Female for AMI  <22.0 ng/L- Negative Male for AMI  >=14 - Abnormal Female indicating possible myocardial injury.  >=22 - Abnormal Male indicating possible myocardial injury.   Clinicians would have to utilize clinical acumen, EKG, Troponin, and serial changes to determine if it is an Acute Myocardial Infarction or myocardial injury due to an underlying chronic condition.         CBC Auto Differential [[id number]]  (Abnormal) Collected: 04/06/25 1155    Specimen: Blood Updated: 04/06/25 1203     WBC 8.04 10*3/mm3      RBC 4.98 10*6/mm3      Hemoglobin 15.0 g/dL      Hematocrit 43.7 %      MCV 87.8 fL      MCH 30.1 pg      MCHC 34.3 g/dL      RDW 12.6 %      RDW-SD 40.0 fl      MPV 10.6 fL      Platelets 239 10*3/mm3      Neutrophil % 52.0 %      Lymphocyte % 35.8 %      Monocyte % 5.6 %      Eosinophil % 5.5 %      Basophil % 0.7 %      Immature Grans % 0.4 %      Neutrophils,  Absolute 4.18 10*3/mm3      Lymphocytes, Absolute 2.88 10*3/mm3      Monocytes, Absolute 0.45 10*3/mm3      Eosinophils, Absolute 0.44 10*3/mm3      Basophils, Absolute 0.06 10*3/mm3      Immature Grans, Absolute 0.03 10*3/mm3      nRBC 0.0 /100 WBC              XR Chest 1 View  Result Date: 4/6/2025  PORTABLE CHEST  HISTORY: Chest pain.  COMPARISON: 02/19/2025  FINDINGS: The cardiac silhouette is normal in size. The mediastinum is unremarkable. The lungs are clear. There is no pneumothorax. The osseous structures are unremarkable.      Impression: No acute cardiopulmonary process.     This report was signed and finalized on 4/6/2025 12:32 PM by Kwesi Swan MD.           MDM      Initial impression of presenting illness: Patient is a 54-year-old male without contributing health history.  Presents to the ER today for chest wall pain.  Reports that this has been present for 2 weeks.  Reports that he has been wearing a cardiac heart monitor and is awaiting results.  Denies shortness of breath.  Denies fever.  Denies OTC medication or home remedy.  Denies alleviating or exacerbating factors.    DDX: includes but is not limited to: Strain, sprain, myocardial infarction or other    Patient arrives stable with vitals interpreted by myself.     Pertinent features from physical exam: Lung sounds are clear bilaterally throughout.  Ab soft nontender.  Bowel sounds normal.  Heart sounds normal..    Initial diagnostic plan: CBC, CMP, troponin, EKG, chest x-ray    Results from initial plan were reviewed and interpreted by me revealing CBC is within appropriate range.  CMP is within appropriate range.  Troponin is negative.  EKG sinus rhythm rate of 65 bpm.  No ST depression or ST elevation.  Chest x-ray is unremarkable    Diagnostic information from other sources: Chart review    Interventions / Re-evaluation: Vital signs stable throughout encounter    Results/clinical rationale were discussed with  patient    Consultations/Discussion of results with other physicians: N/A    Disposition plan: Patient is hemodynamically stable nontoxic-appearing appropriate discharge.  Recommend follow-up with cardiologist.  Follow-up with primary care.  -----        Final diagnoses:   Chest pain, unspecified type          Jose Corbett, APRN  04/06/25 1827

## 2025-04-18 ENCOUNTER — TRANSCRIBE ORDERS (OUTPATIENT)
Dept: ADMINISTRATIVE | Facility: HOSPITAL | Age: 55
End: 2025-04-18
Payer: COMMERCIAL

## 2025-04-18 DIAGNOSIS — R07.9 CHEST PAIN, UNSPECIFIED TYPE: Primary | ICD-10-CM

## 2025-04-27 ENCOUNTER — APPOINTMENT (OUTPATIENT)
Dept: GENERAL RADIOLOGY | Facility: HOSPITAL | Age: 55
End: 2025-04-27
Payer: COMMERCIAL

## 2025-04-27 ENCOUNTER — HOSPITAL ENCOUNTER (EMERGENCY)
Facility: HOSPITAL | Age: 55
Discharge: HOME | End: 2025-04-28
Attending: EMERGENCY MEDICINE | Admitting: EMERGENCY MEDICINE
Payer: COMMERCIAL

## 2025-04-27 DIAGNOSIS — R07.9 CHEST PAIN, UNSPECIFIED TYPE: Primary | ICD-10-CM

## 2025-04-27 LAB
ALBUMIN SERPL-MCNC: 4.1 G/DL (ref 3.5–5.2)
ALBUMIN/GLOB SERPL: 1.5 G/DL
ALP SERPL-CCNC: 40 U/L (ref 39–117)
ALT SERPL W P-5'-P-CCNC: 20 U/L (ref 1–41)
ANION GAP SERPL CALCULATED.3IONS-SCNC: 10.3 MMOL/L (ref 5–15)
AST SERPL-CCNC: 17 U/L (ref 1–40)
BASOPHILS # BLD AUTO: 0.09 10*3/MM3 (ref 0–0.2)
BASOPHILS NFR BLD AUTO: 0.9 % (ref 0–1.5)
BILIRUB SERPL-MCNC: 0.3 MG/DL (ref 0–1.2)
BUN SERPL-MCNC: 9 MG/DL (ref 6–20)
BUN/CREAT SERPL: 9 (ref 7–25)
CALCIUM SPEC-SCNC: 9.5 MG/DL (ref 8.6–10.5)
CHLORIDE SERPL-SCNC: 104 MMOL/L (ref 98–107)
CO2 SERPL-SCNC: 22.7 MMOL/L (ref 22–29)
CREAT SERPL-MCNC: 1 MG/DL (ref 0.76–1.27)
DEPRECATED RDW RBC AUTO: 41.3 FL (ref 37–54)
EGFRCR SERPLBLD CKD-EPI 2021: 88.3 ML/MIN/1.73
EOSINOPHIL # BLD AUTO: 0.92 10*3/MM3 (ref 0–0.4)
EOSINOPHIL NFR BLD AUTO: 8.8 % (ref 0.3–6.2)
ERYTHROCYTE [DISTWIDTH] IN BLOOD BY AUTOMATED COUNT: 12.6 % (ref 12.3–15.4)
GLOBULIN UR ELPH-MCNC: 2.8 GM/DL
GLUCOSE SERPL-MCNC: 94 MG/DL (ref 65–99)
HCT VFR BLD AUTO: 41.2 % (ref 37.5–51)
HGB BLD-MCNC: 14 G/DL (ref 13–17.7)
HOLD SPECIMEN: NORMAL
HOLD SPECIMEN: NORMAL
IMM GRANULOCYTES # BLD AUTO: 0.03 10*3/MM3 (ref 0–0.05)
IMM GRANULOCYTES NFR BLD AUTO: 0.3 % (ref 0–0.5)
LYMPHOCYTES # BLD AUTO: 4.14 10*3/MM3 (ref 0.7–3.1)
LYMPHOCYTES NFR BLD AUTO: 39.6 % (ref 19.6–45.3)
MCH RBC QN AUTO: 30 PG (ref 26.6–33)
MCHC RBC AUTO-ENTMCNC: 34 G/DL (ref 31.5–35.7)
MCV RBC AUTO: 88.4 FL (ref 79–97)
MONOCYTES # BLD AUTO: 0.84 10*3/MM3 (ref 0.1–0.9)
MONOCYTES NFR BLD AUTO: 8 % (ref 5–12)
NEUTROPHILS NFR BLD AUTO: 4.44 10*3/MM3 (ref 1.7–7)
NEUTROPHILS NFR BLD AUTO: 42.4 % (ref 42.7–76)
NRBC BLD AUTO-RTO: 0 /100 WBC (ref 0–0.2)
PLATELET # BLD AUTO: 249 10*3/MM3 (ref 140–450)
PMV BLD AUTO: 10.6 FL (ref 6–12)
POTASSIUM SERPL-SCNC: 4.1 MMOL/L (ref 3.5–5.2)
PROT SERPL-MCNC: 6.9 G/DL (ref 6–8.5)
RBC # BLD AUTO: 4.66 10*6/MM3 (ref 4.14–5.8)
SODIUM SERPL-SCNC: 137 MMOL/L (ref 136–145)
TROPONIN T SERPL HS-MCNC: <6 NG/L
WBC NRBC COR # BLD AUTO: 10.46 10*3/MM3 (ref 3.4–10.8)
WHOLE BLOOD HOLD COAG: NORMAL
WHOLE BLOOD HOLD SPECIMEN: NORMAL

## 2025-04-27 PROCEDURE — 85025 COMPLETE CBC W/AUTO DIFF WBC: CPT | Performed by: EMERGENCY MEDICINE

## 2025-04-27 PROCEDURE — 80053 COMPREHEN METABOLIC PANEL: CPT | Performed by: EMERGENCY MEDICINE

## 2025-04-27 PROCEDURE — 99284 EMERGENCY DEPT VISIT MOD MDM: CPT | Performed by: EMERGENCY MEDICINE

## 2025-04-27 PROCEDURE — 93005 ELECTROCARDIOGRAM TRACING: CPT | Performed by: EMERGENCY MEDICINE

## 2025-04-27 PROCEDURE — 71045 X-RAY EXAM CHEST 1 VIEW: CPT

## 2025-04-27 PROCEDURE — 84484 ASSAY OF TROPONIN QUANT: CPT | Performed by: EMERGENCY MEDICINE

## 2025-04-27 PROCEDURE — 36415 COLL VENOUS BLD VENIPUNCTURE: CPT

## 2025-04-27 RX ORDER — SODIUM CHLORIDE 0.9 % (FLUSH) 0.9 %
10 SYRINGE (ML) INJECTION AS NEEDED
Status: DISCONTINUED | OUTPATIENT
Start: 2025-04-27 | End: 2025-04-28 | Stop reason: HOSPADM

## 2025-04-27 RX ORDER — ASPIRIN 325 MG
325 TABLET ORAL ONCE
Status: COMPLETED | OUTPATIENT
Start: 2025-04-27 | End: 2025-04-27

## 2025-04-27 RX ADMIN — ASPIRIN 325 MG: 325 TABLET ORAL at 23:30

## 2025-04-28 VITALS
WEIGHT: 228 LBS | TEMPERATURE: 97.7 F | BODY MASS INDEX: 34.56 KG/M2 | OXYGEN SATURATION: 98 % | HEIGHT: 68 IN | HEART RATE: 55 BPM | SYSTOLIC BLOOD PRESSURE: 112 MMHG | DIASTOLIC BLOOD PRESSURE: 82 MMHG | RESPIRATION RATE: 20 BRPM

## 2025-04-28 LAB
GEN 5 1HR TROPONIN T REFLEX: <6 NG/L
TROPONIN T NUMERIC DELTA: NORMAL

## 2025-04-28 PROCEDURE — 84484 ASSAY OF TROPONIN QUANT: CPT | Performed by: EMERGENCY MEDICINE

## 2025-04-28 NOTE — ED PROVIDER NOTES
"Subjective:  History of Present Illness:    Patient is a 54-year-old male who presents to the ER today for evaluation after chest pain.  Patient reports that 2 hours ago he had a twinge in his left chest wall that lasted for a second or 2.  He denies current chest pain.  Denies shortness of breath.  Denies abdominal pain.  Denies change in bowel or bladder habit.  Denies OTC medication or home remedy.  Denies alleviating or exacerbating factors    Nurses Notes reviewed and agree, including vitals, allergies, social history and prior medical history.     REVIEW OF SYSTEMS: All systems reviewed and not pertinent unless noted.  Review of Systems   Cardiovascular:  Positive for chest pain.   All other systems reviewed and are negative.      Past Medical History:   Diagnosis Date    Acne vulgaris     Chronic back pain     GERD (gastroesophageal reflux disease)     PTSD (post-traumatic stress disorder)        Allergies:    Shrimp and Penicillins      History reviewed. No pertinent surgical history.      Social History     Socioeconomic History    Marital status:    Tobacco Use    Smoking status: Former     Types: Cigarettes    Smokeless tobacco: Current     Types: Snuff   Vaping Use    Vaping status: Never Used   Substance and Sexual Activity    Alcohol use: No    Drug use: Never    Sexual activity: Defer         History reviewed. No pertinent family history.    Objective  Physical Exam:  /82   Pulse 55   Temp 97.7 °F (36.5 °C) (Oral)   Resp 20   Ht 172.7 cm (68\")   Wt 103 kg (228 lb)   SpO2 98%   BMI 34.67 kg/m²      Physical Exam  Vitals and nursing note reviewed.   Constitutional:       Appearance: He is well-developed and normal weight.   HENT:      Head: Normocephalic and atraumatic.   Eyes:      Extraocular Movements: Extraocular movements intact.      Pupils: Pupils are equal, round, and reactive to light.   Cardiovascular:      Rate and Rhythm: Normal rate and regular rhythm.      Heart " sounds: Normal heart sounds.   Pulmonary:      Breath sounds: Normal breath sounds.   Abdominal:      General: Bowel sounds are normal.      Palpations: Abdomen is soft.   Musculoskeletal:         General: Normal range of motion.      Cervical back: Normal range of motion and neck supple.   Skin:     General: Skin is warm and dry.      Capillary Refill: Capillary refill takes less than 2 seconds.   Neurological:      General: No focal deficit present.      Mental Status: He is alert and oriented to person, place, and time.   Psychiatric:         Mood and Affect: Mood normal.         Behavior: Behavior normal.         Procedures    ED Course:    ED Course as of 04/28/25 0314   Sun Apr 27, 2025   2340 EKG obtained and based on my independent reading shows normal sinus rhythm.  No acute ischemic changes.  Normal AL, QRS and QT intervals. [WP]      ED Course User Index  [WP] Timbo Arita MD       Lab Results (last 24 hours)       Procedure Component Value Units Date/Time    CBC & Differential [[id number]]  (Abnormal) Collected: 04/27/25 2326    Specimen: Blood Updated: 04/27/25 2333    Narrative:      The following orders were created for panel order CBC & Differential.  Procedure                               Abnormality         Status                     ---------                               -----------         ------                     CBC Auto Differential[[id number]]        Abnormal            Final result                 Please view results for these tests on the individual orders.    Comprehensive Metabolic Panel [[id number]] Collected: 04/27/25 2326    Specimen: Blood Updated: 04/27/25 2355     Glucose 94 mg/dL      BUN 9 mg/dL      Creatinine 1.00 mg/dL      Sodium 137 mmol/L      Potassium 4.1 mmol/L      Comment: Slight hemolysis detected by analyzer. Result may be falsely elevated.        Chloride 104 mmol/L      CO2 22.7 mmol/L      Calcium 9.5 mg/dL      Total Protein 6.9 g/dL      Albumin 4.1 g/dL       ALT (SGPT) 20 U/L      AST (SGOT) 17 U/L      Alkaline Phosphatase 40 U/L      Total Bilirubin 0.3 mg/dL      Globulin 2.8 gm/dL      A/G Ratio 1.5 g/dL      BUN/Creatinine Ratio 9.0     Anion Gap 10.3 mmol/L      eGFR 88.3 mL/min/1.73     Narrative:      GFR Categories in Chronic Kidney Disease (CKD)      GFR Category          GFR (mL/min/1.73)    Interpretation  G1                     90 or greater         Normal or high (1)  G2                      60-89                Mild decrease (1)  G3a                   45-59                Mild to moderate decrease  G3b                   30-44                Moderate to severe decrease  G4                    15-29                Severe decrease  G5                    14 or less           Kidney failure          (1)In the absence of evidence of kidney disease, neither GFR category G1 or G2 fulfill the criteria for CKD.    eGFR calculation 2021 CKD-EPI creatinine equation, which does not include race as a factor    High Sensitivity Troponin T [[id number]]  (Normal) Collected: 04/27/25 2326    Specimen: Blood Updated: 04/27/25 2355     HS Troponin T <6 ng/L     Narrative:      High Sensitive Troponin T Reference Range:  <14.0 ng/L- Negative Female for AMI  <22.0 ng/L- Negative Male for AMI  >=14 - Abnormal Female indicating possible myocardial injury.  >=22 - Abnormal Male indicating possible myocardial injury.   Clinicians would have to utilize clinical acumen, EKG, Troponin, and serial changes to determine if it is an Acute Myocardial Infarction or myocardial injury due to an underlying chronic condition.         CBC Auto Differential [[id number]]  (Abnormal) Collected: 04/27/25 2326    Specimen: Blood Updated: 04/27/25 2333     WBC 10.46 10*3/mm3      RBC 4.66 10*6/mm3      Hemoglobin 14.0 g/dL      Hematocrit 41.2 %      MCV 88.4 fL      MCH 30.0 pg      MCHC 34.0 g/dL      RDW 12.6 %      RDW-SD 41.3 fl      MPV 10.6 fL      Platelets 249 10*3/mm3      Neutrophil % 42.4 %       Lymphocyte % 39.6 %      Monocyte % 8.0 %      Eosinophil % 8.8 %      Basophil % 0.9 %      Immature Grans % 0.3 %      Neutrophils, Absolute 4.44 10*3/mm3      Lymphocytes, Absolute 4.14 10*3/mm3      Monocytes, Absolute 0.84 10*3/mm3      Eosinophils, Absolute 0.92 10*3/mm3      Basophils, Absolute 0.09 10*3/mm3      Immature Grans, Absolute 0.03 10*3/mm3      nRBC 0.0 /100 WBC     High Sensitivity Troponin T 1Hr [[id number]] Collected: 04/28/25 0029    Specimen: Blood Updated: 04/28/25 0109     HS Troponin T <6 ng/L      Troponin T Numeric Delta --     Comment: Unable to calculate.       Narrative:      High Sensitive Troponin T Reference Range:  <14.0 ng/L- Negative Female for AMI  <22.0 ng/L- Negative Male for AMI  >=14 - Abnormal Female indicating possible myocardial injury.  >=22 - Abnormal Male indicating possible myocardial injury.   Clinicians would have to utilize clinical acumen, EKG, Troponin, and serial changes to determine if it is an Acute Myocardial Infarction or myocardial injury due to an underlying chronic condition.                  No radiology results from the last 24 hrs       MDM      Initial impression of presenting illness: Patient is a 54-year-old male who presents to the ER today for evaluation after chest pain.  Patient reports that 2 hours ago he had a twinge in his left chest wall that lasted for a second or 2.  He denies current chest pain.  Denies shortness of breath.  Denies abdominal pain.  Denies change in bowel or bladder habit.  Denies OTC medication or home remedy.  Denies alleviating or exacerbating factors    DDX: includes but is not limited to: Myocardial infarction, strain, sprain, or other    Patient arrives stable with vitals interpreted by myself.     Pertinent features from physical exam: Lung sounds are clear bilaterally throughout.  Ab soft nontender.  Bowel sounds normal.  Heart sounds normal..    Initial diagnostic plan: CBC, CMP, troponin x 2, EKG, chest  x-ray    Results from initial plan were reviewed and interpreted by me revealing CBC is within appropriate range.  CMP is within appropriate range.  Troponin x 2 is negative.  EKG normal sinus rhythm.  Chest x-ray is without acute findings.    Diagnostic information from other sources: Chart review    Interventions / Re-evaluation: Vital signs stable throughout encounter.    Results/clinical rationale were discussed with patient    Consultations/Discussion of results with other physicians: N/A    Disposition plan: Patient is hemodynamically stable nontoxic-appearing appropriate discharge.  Outpatient follow-up with PCP within the week.  Follow-up ER for any worse symptoms.  -----        Final diagnoses:   Chest pain, unspecified type          Jose Corbett, APRN  04/28/25 0316

## 2025-04-28 NOTE — DISCHARGE INSTRUCTIONS
Please follow-up with cardiology to get heart monitor placed.  Follow-up ER for any worse symptoms.

## 2025-05-19 ENCOUNTER — HOSPITAL ENCOUNTER (EMERGENCY)
Facility: HOSPITAL | Age: 55
Discharge: HOME | End: 2025-05-19
Attending: EMERGENCY MEDICINE
Payer: MEDICAID

## 2025-05-19 ENCOUNTER — APPOINTMENT (OUTPATIENT)
Dept: GENERAL RADIOLOGY | Facility: HOSPITAL | Age: 55
End: 2025-05-19
Attending: EMERGENCY MEDICINE
Payer: MEDICAID

## 2025-05-19 VITALS
DIASTOLIC BLOOD PRESSURE: 82 MMHG | HEART RATE: 51 BPM | HEIGHT: 67 IN | TEMPERATURE: 98.2 F | RESPIRATION RATE: 14 BRPM | WEIGHT: 221 LBS | OXYGEN SATURATION: 99 % | SYSTOLIC BLOOD PRESSURE: 117 MMHG | BODY MASS INDEX: 34.69 KG/M2

## 2025-05-19 DIAGNOSIS — R00.2 PALPITATIONS: Primary | ICD-10-CM

## 2025-05-19 LAB
ALBUMIN SERPL-MCNC: 4.4 G/DL (ref 3.4–4.8)
ALBUMIN/GLOB SERPL: 1.4 {RATIO} (ref 0.8–2)
ALP SERPL-CCNC: 51 U/L (ref 25–100)
ALT SERPL-CCNC: 22 U/L (ref 4–36)
ANION GAP SERPL CALCULATED.3IONS-SCNC: 10 MMOL/L (ref 3–16)
AST SERPL-CCNC: 18 U/L (ref 8–33)
BASOPHILS # BLD: 0.1 K/UL (ref 0–0.1)
BASOPHILS NFR BLD: 0.9 %
BILIRUB SERPL-MCNC: 0.4 MG/DL (ref 0.3–1.2)
BUN SERPL-MCNC: 13 MG/DL (ref 6–20)
CALCIUM SERPL-MCNC: 9.8 MG/DL (ref 8.3–10.6)
CHLORIDE SERPL-SCNC: 102 MMOL/L (ref 98–107)
CO2 SERPL-SCNC: 27 MMOL/L (ref 20–30)
CREAT SERPL-MCNC: 1 MG/DL (ref 0.4–1.2)
EOSINOPHIL # BLD: 0.5 K/UL (ref 0–0.4)
EOSINOPHIL NFR BLD: 6.3 %
ERYTHROCYTE [DISTWIDTH] IN BLOOD BY AUTOMATED COUNT: 12.8 % (ref 11–16)
GFR SERPLBLD CREATININE-BSD FMLA CKD-EPI: 88 ML/MIN/{1.73_M2}
GLOBULIN SER CALC-MCNC: 3.1 G/DL
GLUCOSE SERPL-MCNC: 121 MG/DL (ref 74–106)
HCT VFR BLD AUTO: 43.3 % (ref 40–54)
HGB BLD-MCNC: 14.5 G/DL (ref 13–18)
IMM GRANULOCYTES # BLD: 0 K/UL
IMM GRANULOCYTES NFR BLD: 0.4 % (ref 0–5)
LYMPHOCYTES # BLD: 3 K/UL (ref 1.5–4)
LYMPHOCYTES NFR BLD: 36.3 %
MAGNESIUM SERPL-MCNC: 2.2 MG/DL (ref 1.7–2.4)
MCH RBC QN AUTO: 30.3 PG (ref 27–32)
MCHC RBC AUTO-ENTMCNC: 33.5 G/DL (ref 31–35)
MCV RBC AUTO: 90.6 FL (ref 80–100)
MONOCYTES # BLD: 0.6 K/UL (ref 0.2–0.8)
MONOCYTES NFR BLD: 7.2 %
NEUTROPHILS # BLD: 4 K/UL (ref 2–7.5)
NEUTS SEG NFR BLD: 48.9 %
NT-PROBNP SERPL-MCNC: 56 PG/ML (ref 0–1800)
PLATELET # BLD AUTO: 265 K/UL (ref 150–400)
PMV BLD AUTO: 10.7 FL (ref 6–10)
POTASSIUM SERPL-SCNC: 4.5 MMOL/L (ref 3.4–5.1)
PROT SERPL-MCNC: 7.5 G/DL (ref 6.4–8.3)
RBC # BLD AUTO: 4.78 M/UL (ref 4.5–6)
SODIUM SERPL-SCNC: 139 MMOL/L (ref 136–145)
TROPONIN, HIGH SENSITIVITY: <6 NG/L (ref 0–22)
TROPONIN, HIGH SENSITIVITY: <6 NG/L (ref 0–22)
WBC # BLD AUTO: 8.2 K/UL (ref 4–11)

## 2025-05-19 PROCEDURE — 36415 COLL VENOUS BLD VENIPUNCTURE: CPT

## 2025-05-19 PROCEDURE — 71045 X-RAY EXAM CHEST 1 VIEW: CPT

## 2025-05-19 PROCEDURE — 83880 ASSAY OF NATRIURETIC PEPTIDE: CPT

## 2025-05-19 PROCEDURE — 83735 ASSAY OF MAGNESIUM: CPT

## 2025-05-19 PROCEDURE — 99285 EMERGENCY DEPT VISIT HI MDM: CPT

## 2025-05-19 PROCEDURE — 6360000002 HC RX W HCPCS: Performed by: EMERGENCY MEDICINE

## 2025-05-19 PROCEDURE — 96365 THER/PROPH/DIAG IV INF INIT: CPT

## 2025-05-19 PROCEDURE — 80053 COMPREHEN METABOLIC PANEL: CPT

## 2025-05-19 PROCEDURE — 2580000003 HC RX 258: Performed by: EMERGENCY MEDICINE

## 2025-05-19 PROCEDURE — 84484 ASSAY OF TROPONIN QUANT: CPT

## 2025-05-19 PROCEDURE — 85025 COMPLETE CBC W/AUTO DIFF WBC: CPT

## 2025-05-19 RX ORDER — ALBUTEROL SULFATE 90 UG/1
2 INHALANT RESPIRATORY (INHALATION) EVERY 6 HOURS PRN
COMMUNITY

## 2025-05-19 RX ADMIN — CEFTRIAXONE 1000 MG: 1 INJECTION, POWDER, FOR SOLUTION INTRAMUSCULAR; INTRAVENOUS at 10:24

## 2025-05-19 ASSESSMENT — PAIN - FUNCTIONAL ASSESSMENT: PAIN_FUNCTIONAL_ASSESSMENT: NONE - DENIES PAIN

## 2025-05-19 NOTE — ED PROVIDER NOTES
Primary Clinician of Record.    FINAL IMPRESSION      1. Palpitations          DISPOSITION/PLAN     DISPOSITION Decision To Discharge 05/19/2025 12:34:16 PM   DISPOSITION CONDITION StableStable discharge to home          PATIENT REFERRED TO:  Mark Whitten PA  [address] [facility] [address] [phone]    Schedule an appointment as soon as possible for a visit in 3 days        DISCHARGE MEDICATIONS:  New Prescriptions    No medications on file       DISCONTINUED MEDICATIONS:  Discontinued Medications    LISINOPRIL (PRINIVIL;ZESTRIL) 5 MG TABLET    Take 1 tablet by mouth in the morning. Needs appt for future refills.              (Please note that portions of this note were completed with a voice recognition program.  Efforts were made to edit the dictations but occasionally words are mis-transcribed.)    Tarah Rosario MD (electronically signed)           Tarah Rosario MD  05/19/25 5235

## 2025-05-30 ENCOUNTER — HOSPITAL ENCOUNTER (OUTPATIENT)
Facility: HOSPITAL | Age: 55
Discharge: HOME | End: 2025-05-30
Payer: MEDICAID

## 2025-05-30 DIAGNOSIS — R00.2 PALPITATIONS: ICD-10-CM

## 2025-05-30 LAB
ECHO AO ASC DIAM: 2.9 CM
ECHO AO ROOT DIAM: 3.3 CM
ECHO AV AREA VTI: 2.4 CM2
ECHO AV MEAN GRADIENT: 4 MMHG
ECHO AV PEAK GRADIENT: 8 MMHG
ECHO AV PEAK VELOCITY: 1.4 M/S
ECHO AV VELOCITY RATIO: 0.79
ECHO AV VTI: 31.5 CM
ECHO LA AREA 4C: 23.4 CM2
ECHO LA DIAMETER: 3.1 CM
ECHO LA MINOR AXIS: 3.1 CM
ECHO LA TO AORTIC ROOT RATIO: 0.94
ECHO LA VOL BP: 77 ML (ref 18–58)
ECHO LV E' LATERAL VELOCITY: 9.46 CM/S
ECHO LV E' SEPTAL VELOCITY: 11.3 CM/S
ECHO LV FRACTIONAL SHORTENING: 33 % (ref 28–44)
ECHO LV INTERNAL DIMENSION DIASTOLIC: 4.8 CM (ref 4.2–5.9)
ECHO LV INTERNAL DIMENSION SYSTOLIC: 3.2 CM
ECHO LV IVSD: 0.7 CM (ref 0.6–1)
ECHO LV IVSS: 0.9 CM
ECHO LV MASS 2D: 116.6 G (ref 88–224)
ECHO LV POSTERIOR WALL DIASTOLIC: 0.8 CM (ref 0.6–1)
ECHO LV POSTERIOR WALL SYSTOLIC: 1.2 CM
ECHO LV RELATIVE WALL THICKNESS RATIO: 0.33
ECHO LVOT AREA: 3.1 CM2
ECHO LVOT AV VTI INDEX: 0.75
ECHO LVOT DIAM: 2 CM
ECHO LVOT PEAK GRADIENT: 5 MMHG
ECHO LVOT PEAK VELOCITY: 1.1 M/S
ECHO LVOT SV: 74.4 ML
ECHO LVOT VTI: 23.7 CM
ECHO MV A VELOCITY: 0.6 M/S
ECHO MV E DECELERATION TIME (DT): 277 MS
ECHO MV E VELOCITY: 0.9 M/S
ECHO MV E/A RATIO: 1.5
ECHO MV E/E' LATERAL: 9.51
ECHO MV E/E' RATIO (AVERAGED): 8.74
ECHO MV E/E' SEPTAL: 7.96
ECHO MV MEAN GRADIENT: 6 MMHG
ECHO MV PEAK GRADIENT: 6 MMHG
ECHO PV MAX VELOCITY: 0.9 M/S
ECHO PV MEAN GRADIENT: 2 MMHG
ECHO PV PEAK GRADIENT: 3 MMHG
ECHO RA AREA 4C: 12.9 CM2
ECHO RV INTERNAL DIMENSION: 2.9 CM
ECHO TV REGURGITANT MAX VELOCITY: 1.3 M/S
ECHO TV REGURGITANT PEAK GRADIENT: 7 MMHG

## 2025-05-30 PROCEDURE — 93306 TTE W/DOPPLER COMPLETE: CPT

## 2025-06-05 LAB
ECHO AO ASC DIAM: 2.9 CM
ECHO AO ROOT DIAM: 3.3 CM
ECHO AV AREA VTI: 2.4 CM2
ECHO AV MEAN GRADIENT: 4 MMHG
ECHO AV PEAK GRADIENT: 8 MMHG
ECHO AV PEAK VELOCITY: 1.4 M/S
ECHO AV VELOCITY RATIO: 0.79
ECHO AV VTI: 31.5 CM
ECHO EST RA PRESSURE: 3 MMHG
ECHO LA AREA 4C: 23.4 CM2
ECHO LA DIAMETER: 3.1 CM
ECHO LA MINOR AXIS: 3.1 CM
ECHO LA TO AORTIC ROOT RATIO: 0.94
ECHO LA VOL BP: 77 ML (ref 18–58)
ECHO LV E' LATERAL VELOCITY: 9.46 CM/S
ECHO LV E' SEPTAL VELOCITY: 11.3 CM/S
ECHO LV EF PHYSICIAN: 60 %
ECHO LV FRACTIONAL SHORTENING: 33 % (ref 28–44)
ECHO LV INTERNAL DIMENSION DIASTOLIC: 4.8 CM (ref 4.2–5.9)
ECHO LV INTERNAL DIMENSION SYSTOLIC: 3.2 CM
ECHO LV IVSD: 0.7 CM (ref 0.6–1)
ECHO LV IVSS: 0.9 CM
ECHO LV MASS 2D: 116.6 G (ref 88–224)
ECHO LV POSTERIOR WALL DIASTOLIC: 0.8 CM (ref 0.6–1)
ECHO LV POSTERIOR WALL SYSTOLIC: 1.2 CM
ECHO LV RELATIVE WALL THICKNESS RATIO: 0.33
ECHO LVOT AREA: 3.1 CM2
ECHO LVOT AV VTI INDEX: 0.75
ECHO LVOT DIAM: 2 CM
ECHO LVOT PEAK GRADIENT: 5 MMHG
ECHO LVOT PEAK VELOCITY: 1.1 M/S
ECHO LVOT SV: 74.4 ML
ECHO LVOT VTI: 23.7 CM
ECHO MV A VELOCITY: 0.6 M/S
ECHO MV E DECELERATION TIME (DT): 277 MS
ECHO MV E VELOCITY: 0.9 M/S
ECHO MV E/A RATIO: 1.5
ECHO MV E/E' LATERAL: 9.51
ECHO MV E/E' RATIO (AVERAGED): 8.74
ECHO MV E/E' SEPTAL: 7.96
ECHO MV MEAN GRADIENT: 6 MMHG
ECHO MV PEAK GRADIENT: 6 MMHG
ECHO PV MAX VELOCITY: 0.9 M/S
ECHO PV MEAN GRADIENT: 2 MMHG
ECHO PV PEAK GRADIENT: 3 MMHG
ECHO RA AREA 4C: 12.9 CM2
ECHO RIGHT VENTRICULAR SYSTOLIC PRESSURE (RVSP): 10 MMHG
ECHO RV INTERNAL DIMENSION: 2.9 CM
ECHO TV REGURGITANT MAX VELOCITY: 1.3 M/S
ECHO TV REGURGITANT PEAK GRADIENT: 7 MMHG